# Patient Record
Sex: FEMALE | Race: WHITE | NOT HISPANIC OR LATINO | Employment: FULL TIME | URBAN - METROPOLITAN AREA
[De-identification: names, ages, dates, MRNs, and addresses within clinical notes are randomized per-mention and may not be internally consistent; named-entity substitution may affect disease eponyms.]

---

## 2017-02-24 ENCOUNTER — HOSPITAL ENCOUNTER (OUTPATIENT)
Dept: CARDIOLOGY | Facility: HOSPITAL | Age: 67
Discharge: HOME | End: 2017-02-24
Attending: INTERNAL MEDICINE
Payer: MEDICARE

## 2017-02-24 DIAGNOSIS — I27.20 PHT (PULMONARY HYPERTENSION): ICD-10-CM

## 2017-02-24 LAB
DIASTOLIC DYSFUNCTION: NO
ESTIMATED PA SYSTOLIC PRESSURE: 22.36
GLOBAL PERICARDIAL EFFUSION: NORMAL
RETIRED EF AND QEF - SEE NOTES: 65 (ref 55–65)
TRICUSPID VALVE REGURGITATION: NORMAL

## 2017-02-24 PROCEDURE — 93306 TTE W/DOPPLER COMPLETE: CPT

## 2017-02-24 PROCEDURE — 93306 TTE W/DOPPLER COMPLETE: CPT | Mod: 26,,, | Performed by: INTERNAL MEDICINE

## 2017-05-01 DIAGNOSIS — J84.9 ILD (INTERSTITIAL LUNG DISEASE): Primary | ICD-10-CM

## 2017-05-01 DIAGNOSIS — I27.20 PULMONARY HYPERTENSION: ICD-10-CM

## 2017-05-08 ENCOUNTER — HOSPITAL ENCOUNTER (OUTPATIENT)
Dept: PULMONOLOGY | Facility: HOSPITAL | Age: 67
Discharge: HOME | End: 2017-05-08
Attending: INTERNAL MEDICINE
Payer: MEDICARE

## 2017-05-08 DIAGNOSIS — J84.9 ILD (INTERSTITIAL LUNG DISEASE): ICD-10-CM

## 2017-05-08 DIAGNOSIS — I27.20 PULMONARY HYPERTENSION: ICD-10-CM

## 2017-05-08 PROCEDURE — 94010 BREATHING CAPACITY TEST: CPT

## 2017-05-08 PROCEDURE — 94729 DIFFUSING CAPACITY: CPT

## 2017-05-08 PROCEDURE — 94726 PLETHYSMOGRAPHY LUNG VOLUMES: CPT

## 2017-05-10 NOTE — PROCEDURES
[facility] Pulmonary Medicine -- Pulmonary Function Test Interpretation    Date of Study: 5/8/2017      Requesting Physician:   Dr. Willson    Diagnosis:   Dyspnea, Pulmonary Hypertension    Comparison Studies:   5/2016    Interpretation:   1. No significant obstruction   2. No restriction   3. Increased RV suggestive of underlying air trapping.   4. DLCO uncorrected for Hb is moderately reduced   5. Compared to prior study in 5/2016, no significant interval change is noted     Matty Powers M.D.  [facility] Pulmonar/Critical Care Fellow

## 2017-06-05 ENCOUNTER — TELEPHONE (OUTPATIENT)
Dept: OBSTETRICS AND GYNECOLOGY | Facility: CLINIC | Age: 67
End: 2017-06-05

## 2017-06-05 DIAGNOSIS — Z12.31 VISIT FOR SCREENING MAMMOGRAM: Primary | ICD-10-CM

## 2017-06-05 NOTE — TELEPHONE ENCOUNTER
Medicare patient requesting mammogram orders. Last GYN routine exam 06/15/16 (pap not indicated). Order pending, please advise.

## 2017-06-05 NOTE — TELEPHONE ENCOUNTER
----- Message from Katrina Jonas sent at 6/5/2017  9:10 AM CDT -----  Contact: Self [phone]  Patient is requesting Mammo Orders. Please advice

## 2017-06-19 ENCOUNTER — HOSPITAL ENCOUNTER (OUTPATIENT)
Dept: RADIOLOGY | Facility: HOSPITAL | Age: 67
Discharge: HOME | End: 2017-06-19
Attending: OBSTETRICS & GYNECOLOGY
Payer: MEDICARE

## 2017-06-19 VITALS — WEIGHT: 205 LBS | HEIGHT: 69 IN | BODY MASS INDEX: 30.36 KG/M2

## 2017-06-19 DIAGNOSIS — Z12.31 VISIT FOR SCREENING MAMMOGRAM: ICD-10-CM

## 2017-06-19 PROCEDURE — 77067 SCR MAMMO BI INCL CAD: CPT | Mod: 26,,, | Performed by: RADIOLOGY

## 2017-06-19 PROCEDURE — 77067 SCR MAMMO BI INCL CAD: CPT | Mod: TC

## 2017-06-19 PROCEDURE — 77063 BREAST TOMOSYNTHESIS BI: CPT | Mod: 26,,, | Performed by: RADIOLOGY

## 2017-09-13 ENCOUNTER — OFFICE VISIT (OUTPATIENT)
Dept: OTOLARYNGOLOGY | Facility: CLINIC | Age: 67
End: 2017-09-13
Payer: MEDICARE

## 2017-09-13 ENCOUNTER — CLINICAL SUPPORT (OUTPATIENT)
Dept: OTOLARYNGOLOGY | Facility: CLINIC | Age: 67
End: 2017-09-13
Payer: MEDICARE

## 2017-09-13 VITALS
BODY MASS INDEX: 30.21 KG/M2 | HEART RATE: 72 BPM | HEIGHT: 69 IN | SYSTOLIC BLOOD PRESSURE: 108 MMHG | DIASTOLIC BLOOD PRESSURE: 64 MMHG | WEIGHT: 203.94 LBS

## 2017-09-13 DIAGNOSIS — H61.23 BILATERAL IMPACTED CERUMEN: ICD-10-CM

## 2017-09-13 DIAGNOSIS — G47.33 OSA ON CPAP: ICD-10-CM

## 2017-09-13 DIAGNOSIS — H91.8X3 OTHER SPECIFIED HEARING LOSS OF BOTH EARS: ICD-10-CM

## 2017-09-13 DIAGNOSIS — M35.00 SJOGREN'S SYNDROME, WITH UNSPECIFIED ORGAN INVOLVEMENT: ICD-10-CM

## 2017-09-13 DIAGNOSIS — H93.13 TINNITUS, BILATERAL: ICD-10-CM

## 2017-09-13 DIAGNOSIS — R51.9 FREQUENT HEADACHES: ICD-10-CM

## 2017-09-13 DIAGNOSIS — D33.3 ACOUSTIC NEUROMA: Primary | ICD-10-CM

## 2017-09-13 DIAGNOSIS — L43.8 ORAL LICHEN PLANUS: ICD-10-CM

## 2017-09-13 DIAGNOSIS — H93.13 TINNITUS AURIUM, BILATERAL: Primary | ICD-10-CM

## 2017-09-13 PROCEDURE — 3008F BODY MASS INDEX DOCD: CPT | Mod: S$GLB,,, | Performed by: OTOLARYNGOLOGY

## 2017-09-13 PROCEDURE — 99214 OFFICE O/P EST MOD 30 MIN: CPT | Mod: 25,S$GLB,, | Performed by: OTOLARYNGOLOGY

## 2017-09-13 PROCEDURE — 1159F MED LIST DOCD IN RCRD: CPT | Mod: S$GLB,,, | Performed by: OTOLARYNGOLOGY

## 2017-09-13 PROCEDURE — 92557 COMPREHENSIVE HEARING TEST: CPT | Mod: S$GLB,,, | Performed by: AUDIOLOGIST

## 2017-09-13 PROCEDURE — G0268 REMOVAL OF IMPACTED WAX MD: HCPCS | Mod: S$GLB,,, | Performed by: OTOLARYNGOLOGY

## 2017-09-13 NOTE — PATIENT INSTRUCTIONS
Ordered follow up MRI of brain and inner ear.  Return for results and further recommendations in 2 weeks and to meet with audiologist.

## 2017-09-24 PROCEDURE — 69210 REMOVE IMPACTED EAR WAX UNI: CPT | Mod: S$GLB,,, | Performed by: OTOLARYNGOLOGY

## 2017-09-25 NOTE — PROGRESS NOTES
Subjective:       Patient ID: Bharti Woodard is a 66 y.o. female.    Chief Complaint: Follow-up (tinnitus and acoustic schwannoma)    Pt is known to me with hx of tinnitus for many years and worse in the right ear.  She has had cerumen occlusion of the ear canals and right middle ear effusion in the past.  An MRI done one year ago revealed a small left sided intracanalicular/CPA lesion of 8 mm consistent with acoustic schwannoma which after review of options she elected to observe.  There was mild asymmetry on her audiogram one year ago being slightly worse on the right as well as the tinnitus slightly worse on the right.  Today she reports her hearing is subjectively about the same AU and the tinnitus now seems about the same AU.  She has a new c/o mild left occipital headaches which may radiate into her left brow/eye and left cheek area occurring every 3 to 4 weeks for the past 6 months.  This seems to occur when she goes to bed.  There are no associated nasal symptoms and no other neurologic symptoms.  She is on CPAP for SISI using a full face mask every evening as of the past few months which she reports tolerating well with good results and stable weight.                           Review of Systems   Ears: Positive for ringing in ear.  Negative for hearing loss, ear pain, ear pressure, ear discharge, ear infections, head trauma and taken gentramycin/streptomycin.    Nose:  Positive for snoring. Negative for nosebleeds, nasal obstruction, nasal or sinus surgery and loss of smell.    Mouth/Throat: Positive for oral ulcers. Negative for pain swallowing, impaired swallowing, hoarse voice, throat mass, neck mass and neck lumps.   Constitutional: Negative for recent unexplained weight loss and fever.    Eyes:  Negative for history of glaucoma.   Cardiovascular:  Negative for chest pain and palpitations.   Respiratory:  Positive for recent cough. Negative for asthma and history of tuberculosis.    Gastrointestinal:   Negative for history of stomach ulcers or pain, acid reflux, indigestion and blood in stool.   Other:  Positive for arthritis, depression and anxiety. Negative for kidney problem, swollen glands, anemia and persistent infections.           Objective:      Physical Exam   Constitutional: She is oriented to person, place, and time. She appears well-developed and well-nourished. No distress.   HENT:   Head: Normocephalic and atraumatic.   Right Ear: Tympanic membrane and external ear normal.   Left Ear: Tympanic membrane and external ear normal.   Nose: No mucosal edema, rhinorrhea, nasal deformity or septal deviation. No epistaxis.   Mouth/Throat: Uvula is midline. No uvula swelling. No oropharyngeal exudate, posterior oropharyngeal edema or posterior oropharyngeal erythema.   The ear canals are occluded by cerumen bilaterally.  Upon removal of cerumen, the canals and TM's are otherwise nl.     There is a superficial pale white mucosal lesion of approximately 1.5 cm along the left buccal area with no worrisome features at this time and long hx of lichen planus and no tobacco use in 22 years.  The site is essentially asymptomatic with some fluxes in size and overall no progression.         Eyes: Conjunctivae and EOM are normal. Pupils are equal, round, and reactive to light. No scleral icterus.   Neck: Phonation normal. Neck supple. No tracheal deviation present. No thyromegaly present.   Cardiovascular: Normal rate and regular rhythm.    Pulmonary/Chest: Effort normal. No stridor. No respiratory distress.   Musculoskeletal: Normal range of motion.   Lymphadenopathy:     She has no cervical adenopathy.   Neurological: She is alert and oriented to person, place, and time. Coordination normal.   Skin: Skin is warm and dry. She is not diaphoretic.   Psychiatric: She has a normal mood and affect. Her behavior is normal.       Assessment:       1. Acoustic neuroma    2. Frequent headaches    3. Tinnitus, bilateral    4.  Other specified hearing loss of both ears    5. Bilateral impacted cerumen    6. Sjogren's syndrome, with unspecified organ involvement    7. Oral lichen planus    8. SISI on CPAP        Plan:       Ears cleaned as per procedure note.  Reviewed all above with pt and recommend follow up audiogram and pt agrees.      Audiogram reviewed with pt and there is normal to borderline high frequency hearing loss in both ears with good speech discrimination bilaterally.  Reviewed all above hx and findings and considerations and recommend follow up MRI in light of new c/o headaches.  Pt to follow up after MRI to discuss results and additional recommendations as indicated.

## 2017-09-25 NOTE — PROCEDURES
Ear Cerumen Removal  Date/Time: 9/24/2017 8:09 PM  Performed by: KAREN HUBBARD  Authorized by: KAREN HUBBARD     Consent Done?:  Yes (Verbal)  Location details:  Both ears  Procedure type: curette    Cerumen  Removal Results:  Cerumen completely removed  Patient tolerance:  Patient tolerated the procedure well with no immediate complications

## 2017-10-12 ENCOUNTER — TELEPHONE (OUTPATIENT)
Dept: OTOLARYNGOLOGY | Facility: CLINIC | Age: 67
End: 2017-10-12

## 2017-10-12 DIAGNOSIS — D33.3 ACOUSTIC NEUROMA: Primary | ICD-10-CM

## 2017-10-25 ENCOUNTER — HOSPITAL ENCOUNTER (OUTPATIENT)
Dept: RADIOLOGY | Facility: HOSPITAL | Age: 67
Discharge: HOME | End: 2017-10-25
Attending: OTOLARYNGOLOGY
Payer: MEDICARE

## 2017-10-25 DIAGNOSIS — D33.3 ACOUSTIC NEUROMA: ICD-10-CM

## 2017-10-25 PROCEDURE — 25500020 PHARM REV CODE 255: Performed by: OTOLARYNGOLOGY

## 2017-10-25 PROCEDURE — 70553 MRI BRAIN STEM W/O & W/DYE: CPT | Mod: TC

## 2017-10-25 PROCEDURE — 70553 MRI BRAIN STEM W/O & W/DYE: CPT | Mod: 26,,, | Performed by: RADIOLOGY

## 2017-10-25 PROCEDURE — A9585 GADOBUTROL INJECTION: HCPCS | Performed by: OTOLARYNGOLOGY

## 2017-10-25 RX ORDER — GADOBUTROL 604.72 MG/ML
10 INJECTION INTRAVENOUS
Status: COMPLETED | OUTPATIENT
Start: 2017-10-25 | End: 2017-10-25

## 2017-10-25 RX ADMIN — GADOBUTROL 10 ML: 604.72 INJECTION INTRAVENOUS at 05:10

## 2017-10-26 ENCOUNTER — PATIENT MESSAGE (OUTPATIENT)
Dept: OTOLARYNGOLOGY | Facility: CLINIC | Age: 67
End: 2017-10-26

## 2017-10-26 ENCOUNTER — TELEPHONE (OUTPATIENT)
Dept: OTOLARYNGOLOGY | Facility: CLINIC | Age: 67
End: 2017-10-26

## 2017-11-02 ENCOUNTER — OFFICE VISIT (OUTPATIENT)
Dept: OTOLARYNGOLOGY | Facility: CLINIC | Age: 67
End: 2017-11-02
Payer: MEDICARE

## 2017-11-02 VITALS
TEMPERATURE: 97 F | HEIGHT: 69 IN | DIASTOLIC BLOOD PRESSURE: 71 MMHG | SYSTOLIC BLOOD PRESSURE: 116 MMHG | HEART RATE: 73 BPM

## 2017-11-02 DIAGNOSIS — R51.9 FREQUENT HEADACHES: ICD-10-CM

## 2017-11-02 DIAGNOSIS — H93.13 TINNITUS, BILATERAL: ICD-10-CM

## 2017-11-02 DIAGNOSIS — D33.3 ACOUSTIC NEUROMA: Primary | ICD-10-CM

## 2017-11-02 PROCEDURE — 99214 OFFICE O/P EST MOD 30 MIN: CPT | Mod: S$GLB,,, | Performed by: OTOLARYNGOLOGY

## 2017-11-02 NOTE — PROGRESS NOTES
Subjective:       Patient ID: Bharti Woodard is a 67 y.o. female.    Chief Complaint: Other (go over MRI)    Ms. Woodard is here to discuss follow up MRI.  As previously noted, she was initially seen by me approximately 1 year ago with right-sided ear complaints.  Cerumen impactions were cleaned and she continued with right greater than left tinnitus and there was mild right sensorineural hearing loss.  MRI revealed a contralateral / left sided 8 mm lesion consistent with acoustic schwannoma.  Her options were reviewed including observation serial examination and stereotactic radiation.  Given its size and normal hearing in the left ear as well as her age she elected to observe.  She was given a copy of the report and offered neurotology evaluation but deferred.  She recently returned for follow-up of this as well as recurring cerumen impactions.  She now finds the tinnitus and hearing to be about the same in both ears.  However she has noted occasional headaches in the left occipital and left cheek and brow areas over the past few months.               Review of Systems   Ears: Positive for ringing in ear.  Negative for hearing loss, ear pain, ear pressure, ear discharge, ear infections, head trauma and taken gentramycin/streptomycin.    Nose:  Positive for snoring. Negative for nosebleeds, nasal obstruction, nasal or sinus surgery and loss of smell.    Mouth/Throat: Positive for oral ulcers. Negative for pain swallowing, impaired swallowing, hoarse voice, throat mass, neck mass and neck lumps.   Constitutional: Negative for recent unexplained weight loss and fever.    Eyes:  Negative for history of glaucoma.   Cardiovascular:  Negative for chest pain and palpitations.   Respiratory:  Negative for asthma and history of tuberculosis.    Gastrointestinal:  Negative for history of stomach ulcers or pain, acid reflux, indigestion and blood in stool.   Other:  Positive for arthritis, depression and anxiety. Negative for  kidney problem, swollen glands, anemia and persistent infections.           Objective:      Physical Exam   Constitutional: She is oriented to person, place, and time. She appears well-developed and well-nourished. No distress.   HENT:   Head: Normocephalic and atraumatic.   Right Ear: Tympanic membrane, external ear and ear canal normal.   Left Ear: Tympanic membrane, external ear and ear canal normal.   Nose: No mucosal edema, rhinorrhea, nasal deformity or septal deviation. No epistaxis.   Mouth/Throat: Uvula is midline. No uvula swelling. No oropharyngeal exudate, posterior oropharyngeal edema or posterior oropharyngeal erythema.   Left buccal area of lichen planus as per last note.         Eyes: Conjunctivae and EOM are normal. Pupils are equal, round, and reactive to light. No scleral icterus.   Neck: Phonation normal. Neck supple. No tracheal deviation present. No thyromegaly present.   Pulmonary/Chest: Effort normal. No stridor. No respiratory distress.   Musculoskeletal: Normal range of motion.   Lymphadenopathy:     She has no cervical adenopathy.   Neurological: She is alert and oriented to person, place, and time. Coordination normal.   Skin: Skin is warm and dry. She is not diaphoretic.   Psychiatric: She has a normal mood and affect. Her behavior is normal.       Assessment:       1. Acoustic neuroma    2. Tinnitus, bilateral    3. Frequent headaches        Plan:       Reviewed MRI of brain and IAC's report detail with pt which reveals possible slight enlargement and recommend she see Dr. Perez, neurotologist to discuss further.  Dr. Castillo's contact information provided as well as cc of MRI report.  Offered to make appointment for pt, but states wants to discuss with family and make appointment herself and indicates may go to neurotologist outside of the [facility] system.  Pt to call if assistance needed in getting appointment or other assistance needed.

## 2017-11-02 NOTE — PATIENT INSTRUCTIONS
See Dr. Perez, neurotologist to discuss further.  Offered to make appointment for pt, but she wants to call herself.    Their phone number is [phone].  Pt to call here if any assistance needed with this.

## 2017-11-16 DIAGNOSIS — I27.20 PULMONARY HYPERTENSION: ICD-10-CM

## 2017-11-16 DIAGNOSIS — J84.9 ILD (INTERSTITIAL LUNG DISEASE): Primary | ICD-10-CM

## 2017-11-17 ENCOUNTER — HOSPITAL ENCOUNTER (OUTPATIENT)
Dept: PULMONOLOGY | Facility: CLINIC | Age: 67
Discharge: HOME | End: 2017-11-17
Payer: MEDICARE

## 2017-11-17 ENCOUNTER — OFFICE VISIT (OUTPATIENT)
Dept: OTOLARYNGOLOGY | Facility: CLINIC | Age: 67
End: 2017-11-17
Payer: MEDICARE

## 2017-11-17 VITALS — BODY MASS INDEX: 29.95 KG/M2 | WEIGHT: 202.81 LBS

## 2017-11-17 VITALS — HEIGHT: 69 IN | WEIGHT: 202 LBS | BODY MASS INDEX: 29.92 KG/M2

## 2017-11-17 DIAGNOSIS — D33.3 ACOUSTIC NEUROMA: Primary | ICD-10-CM

## 2017-11-17 DIAGNOSIS — I27.20 PULMONARY HYPERTENSION: ICD-10-CM

## 2017-11-17 DIAGNOSIS — J84.9 ILD (INTERSTITIAL LUNG DISEASE): ICD-10-CM

## 2017-11-17 PROCEDURE — 94620 PR PULMONARY STRESS TESTING,SIMPLE: CPT | Mod: S$GLB,,, | Performed by: INTERNAL MEDICINE

## 2017-11-17 PROCEDURE — 99999 PR PBB SHADOW E&M-EST. PATIENT-LVL II: CPT | Mod: PBBFAC,,, | Performed by: OTOLARYNGOLOGY

## 2017-11-17 PROCEDURE — 99215 OFFICE O/P EST HI 40 MIN: CPT | Mod: S$GLB,,, | Performed by: OTOLARYNGOLOGY

## 2017-11-17 NOTE — PROGRESS NOTES
Subjective:       Patient ID: Bharti Woodard is a 67 y.o. female.    Chief Complaint: acoustic neuroma    HPI: Ref DR Welsh.    Has small L VN.    Has grown 1-2 mm over last yr.    Now 1.1x.8x.6 cm.    No other sx's.    Past Medical History: Patient has a past medical history of Depression; ILD (interstitial lung disease); Pulmonary hypertension; and Sjoegren syndrome.    Past Surgical History: Patient has a past surgical history that includes Lung biopsy; Cardiac catheterization (Right, 2016); Hysterectomy; and Cataract surgery (Bilateral).    Social History: Patient reports that she quit smoking about 22 years ago. She has never used smokeless tobacco. She reports that she drinks alcohol. She reports that she does not use drugs.    Family History: family history includes Breast cancer in her maternal aunt and sister; Diabetes in her brother, father, mother, and sister; Heart disease in her mother; Stroke in her brother.    Medications:   Current Outpatient Prescriptions   Medication Sig    ADCIRCA 20 mg Tab Take 40 mg by mouth once daily.    furosemide (LASIX) 20 MG tablet Take 40 mg by mouth 2 (two) times daily.    mycophenolate (CELLCEPT) 500 mg Tab Take 1,000 mg by mouth 2 (two) times daily.      No current facility-administered medications for this visit.        Allergies: Patient has No Known Allergies.      Review of Systems   Constitutional: Negative.    HENT: Negative for ear discharge, ear pain, hearing loss and tinnitus.    Neurological: Negative for dizziness, seizures, syncope, facial asymmetry, speech difficulty, weakness, light-headedness and headaches.       Objective:      Physical Exam   Constitutional: She appears well-developed and well-nourished. No distress.   HENT:   Right Ear: External ear normal. No lacerations. No drainage, swelling or tenderness. No foreign bodies. No mastoid tenderness. Tympanic membrane is not injected, not scarred, not perforated, not erythematous, not retracted and  not bulging. Tympanic membrane mobility is normal. No middle ear effusion. No hemotympanum. No decreased hearing is noted.   Left Ear: External ear normal. No lacerations. No drainage, swelling or tenderness. No foreign bodies. No mastoid tenderness. Tympanic membrane is not injected, not scarred, not perforated, not erythematous, not retracted and not bulging. Tympanic membrane mobility is normal.  No middle ear effusion. No hemotympanum. No decreased hearing is noted.   Eyes: Right eye exhibits normal extraocular motion and no nystagmus. Left eye exhibits normal extraocular motion and no nystagmus.   Neurological: She has normal strength. No cranial nerve deficit or sensory deficit. She displays a negative Romberg sign. Coordination and gait normal.   Skin: She is not diaphoretic.               Assessment:       1. Acoustic neuroma        Plan:         Long talk 30-40 min. Imaging review    Rec focused XRT vs MS vs obs.    She will make appt. XRT.

## 2017-11-17 NOTE — LETTER
November 17, 2017      Jasmyne Welsh MD  [address]  [address]  [facility] [address]           [facility] O[facility]  [address]  [facility] [address]  Phone: [phone]  Fax: [phone]          Patient: Bharti Woodard   MR Number: [id number]   YOB: 1950   Date of Visit: 11/17/2017       Dear Dr. Jasmyne Welsh:    Thank you for referring Bharti Woodard to me for evaluation. Attached you will find relevant portions of my assessment and plan of care.    If you have questions, please do not hesitate to call me. I look forward to following Bharti Woodard along with you.    Sincerely,    Osorio Perez MD    Enclosure  CC:  No Recipients    If you would like to receive this communication electronically, please contact [email or web address] or [phone] to request more information on [employer] access.    For providers and/or their staff who would like to refer a patient to [facility], please contact us through our one-stop-shop provider referral line, [facility], at [phone].    If you feel you have received this communication in error or would no longer like to receive these types of communications, please e-mail [email or web address]

## 2017-11-20 ENCOUNTER — PATIENT MESSAGE (OUTPATIENT)
Dept: OTOLARYNGOLOGY | Facility: CLINIC | Age: 67
End: 2017-11-20

## 2017-11-27 ENCOUNTER — TELEPHONE (OUTPATIENT)
Dept: RADIATION ONCOLOGY | Facility: CLINIC | Age: 67
End: 2017-11-27

## 2017-11-27 NOTE — TELEPHONE ENCOUNTER
----- Message from Paula Denis sent at 11/27/2017  2:37 PM CST -----  Pt would like to speak to you about the appointment you made her with Dr Peguero. Please call at [phone].  Return call to patient she wishes to cx her appt

## 2018-01-19 ENCOUNTER — HOSPITAL ENCOUNTER (OUTPATIENT)
Dept: RADIOLOGY | Facility: HOSPITAL | Age: 68
Discharge: HOME | End: 2018-01-19
Attending: INTERNAL MEDICINE
Payer: MEDICARE

## 2018-01-19 DIAGNOSIS — J18.9 PNEUMONIA OF RIGHT LOWER LOBE DUE TO INFECTIOUS ORGANISM: Primary | ICD-10-CM

## 2018-01-19 DIAGNOSIS — J18.9 PNEUMONIA OF RIGHT LOWER LOBE DUE TO INFECTIOUS ORGANISM: ICD-10-CM

## 2018-01-19 PROCEDURE — 71046 X-RAY EXAM CHEST 2 VIEWS: CPT | Mod: 26,,, | Performed by: RADIOLOGY

## 2018-01-19 PROCEDURE — 71046 X-RAY EXAM CHEST 2 VIEWS: CPT | Mod: TC,FY

## 2018-07-09 ENCOUNTER — PATIENT MESSAGE (OUTPATIENT)
Dept: OBSTETRICS AND GYNECOLOGY | Facility: CLINIC | Age: 68
End: 2018-07-09

## 2018-11-29 ENCOUNTER — HOSPITAL ENCOUNTER (OUTPATIENT)
Dept: RADIOLOGY | Facility: HOSPITAL | Age: 68
Discharge: HOME | End: 2018-11-29
Attending: OBSTETRICS & GYNECOLOGY
Payer: MEDICARE

## 2018-11-29 ENCOUNTER — OFFICE VISIT (OUTPATIENT)
Dept: OBSTETRICS AND GYNECOLOGY | Facility: CLINIC | Age: 68
End: 2018-11-29
Payer: MEDICARE

## 2018-11-29 VITALS
WEIGHT: 213.31 LBS | BODY MASS INDEX: 31.6 KG/M2 | HEIGHT: 69 IN | SYSTOLIC BLOOD PRESSURE: 118 MMHG | DIASTOLIC BLOOD PRESSURE: 66 MMHG

## 2018-11-29 DIAGNOSIS — Z01.419 ENCOUNTER FOR GYNECOLOGICAL EXAMINATION WITHOUT ABNORMAL FINDING: Primary | ICD-10-CM

## 2018-11-29 DIAGNOSIS — Z12.39 BREAST CANCER SCREENING: ICD-10-CM

## 2018-11-29 PROCEDURE — G0101 CA SCREEN;PELVIC/BREAST EXAM: HCPCS | Mod: S$GLB,,, | Performed by: OBSTETRICS & GYNECOLOGY

## 2018-11-29 PROCEDURE — 77063 BREAST TOMOSYNTHESIS BI: CPT | Mod: 26,,, | Performed by: RADIOLOGY

## 2018-11-29 PROCEDURE — 99999 PR PBB SHADOW E&M-EST. PATIENT-LVL III: CPT | Mod: PBBFAC,,, | Performed by: OBSTETRICS & GYNECOLOGY

## 2018-11-29 PROCEDURE — 77067 SCR MAMMO BI INCL CAD: CPT | Mod: TC

## 2018-11-29 PROCEDURE — 77063 BREAST TOMOSYNTHESIS BI: CPT | Mod: TC

## 2018-11-29 PROCEDURE — 77067 SCR MAMMO BI INCL CAD: CPT | Mod: 26,,, | Performed by: RADIOLOGY

## 2018-11-29 RX ORDER — CYCLOSPORINE 0.5 MG/ML
1 EMULSION OPHTHALMIC 2 TIMES DAILY
Refills: 11 | COMMUNITY
Start: 2018-11-16

## 2018-11-29 NOTE — PROGRESS NOTES
"OBSTETRIC HISTORY:   OB History      Para Term  AB Living    3 3 3     3    SAB TAB Ectopic Multiple Live Births            3         COMPREHENSIVE GYN HISTORY:    PAP History: Denies abnormal Paps.  Infection History: Denies STDs. Denies PID.  Benign History: Denies uterine fibroids. Denies ovarian cysts. Denies endometriosis.   Cancer History: Denies cervical cancer. Denies uterine cancer or hyperplasia. Denies ovarian cancer. Denies vulvar cancer or pre-cancer. Denies vaginal cancer or pre-cancer. Denies breast cancer. Denies colon cancer.  Sexual Activity History:not sexually active  S/P hysterectomy           HPI:   68 y.o. Patient's last menstrual period was 1976.    Patient is  here for Medicare pelvic and breast exam. She has no complaints. She denies bladder, bowel and breast complaints.    ROS:  GENERAL: Denies weight gain or weight loss. Feeling well overall.   SKIN: Denies rash or lesions.   HEAD: Denies headache.   NODES: Denies enlarged lymph nodes.   CHEST: Denies shortness of breath.   ABDOMEN: No abdominal pain, constipation, diarrhea, nausea, vomiting or rectal bleeding.   URINARY: No frequency, dysuria, hematuria, or burning on urination.  REPRODUCTIVE: See HPI.   BREASTS: The patient denies pain, lumps, or nipple discharge.   HEMATOLOGIC: No easy bruisability.   MUSCULOSKELETAL: Denies joint pain or back pain.   NEUROLOGIC: Denies weakness.   PSYCHIATRIC: Denies depression, anxiety or mood swings.        PE:   /66   Ht 5' 9" (1.753 m)   Wt 96.7 kg (213 lb 4.7 oz)   LMP 1976   BMI 31.50 kg/m²   APPEARANCE: Well nourished, well developed, in no acute distress.  NECK: Neck symmetric without thyromegaly.  NODES: No inguinal or cervical lymph node enlargement.  CHEST: Lungs clear to auscultation.  HEART: Regular rate and rhythm, no murmurs, rubs or gallops.  ABDOMEN: Soft. No tenderness or masses. No hernias.  BREASTS: Symmetrical, no skin changes or visible " lesions. No palpable masses, nipple discharge or adenopathy bilaterally.  VULVA: No lesions. Normal female genitalia.  URETHRAL MEATUS: Normal size and location, no lesions, no prolapse.  URETHRA: No masses, tenderness, prolapse or scarring.  VAGINA: Atrophic and not well rugated, no discharge, no significant cystocele or rectocele.  CERVIX AND UTERUS SURGICALLY ABSENT.   ADNEXA: No masses or tenderness.    PROCEDURES:  Pap smear -- NOT INDICATED    Assessment/Plan:  Medicare pelvic and breast exam

## 2019-11-21 ENCOUNTER — TELEPHONE (OUTPATIENT)
Dept: OBSTETRICS AND GYNECOLOGY | Facility: CLINIC | Age: 69
End: 2019-11-21

## 2019-11-21 DIAGNOSIS — Z12.31 ENCOUNTER FOR SCREENING MAMMOGRAM FOR BREAST CANCER: Primary | ICD-10-CM

## 2019-11-21 NOTE — TELEPHONE ENCOUNTER
----- Message from Jessica Trinh sent at 11/21/2019  9:33 AM CST -----  Contact: [phone]  Patient is requesting a call back concerning having orders put in for a mammo. Please call

## 2019-12-16 ENCOUNTER — HOSPITAL ENCOUNTER (OUTPATIENT)
Dept: RADIOLOGY | Facility: HOSPITAL | Age: 69
Discharge: HOME | End: 2019-12-16
Attending: OBSTETRICS & GYNECOLOGY
Payer: MEDICARE

## 2019-12-16 DIAGNOSIS — Z12.31 ENCOUNTER FOR SCREENING MAMMOGRAM FOR BREAST CANCER: ICD-10-CM

## 2019-12-16 PROCEDURE — 77067 SCR MAMMO BI INCL CAD: CPT | Mod: 26,,, | Performed by: RADIOLOGY

## 2019-12-16 PROCEDURE — 77067 MAMMO DIGITAL SCREENING BILAT WITH TOMOSYNTHESIS_CAD: ICD-10-PCS | Mod: 26,,, | Performed by: RADIOLOGY

## 2019-12-16 PROCEDURE — 77063 MAMMO DIGITAL SCREENING BILAT WITH TOMOSYNTHESIS_CAD: ICD-10-PCS | Mod: 26,,, | Performed by: RADIOLOGY

## 2019-12-16 PROCEDURE — 77067 SCR MAMMO BI INCL CAD: CPT | Mod: TC

## 2019-12-16 PROCEDURE — 77063 BREAST TOMOSYNTHESIS BI: CPT | Mod: 26,,, | Performed by: RADIOLOGY

## 2020-01-28 ENCOUNTER — HOSPITAL ENCOUNTER (OUTPATIENT)
Dept: RADIOLOGY | Facility: HOSPITAL | Age: 70
Discharge: HOME | End: 2020-01-28
Attending: INTERNAL MEDICINE
Payer: MEDICARE

## 2020-01-28 DIAGNOSIS — R07.9 CHEST PAIN, UNSPECIFIED: ICD-10-CM

## 2020-01-28 PROCEDURE — 71275 CTA CHEST NON CORONARY: ICD-10-PCS | Mod: 26,,, | Performed by: RADIOLOGY

## 2020-01-28 PROCEDURE — 71275 CT ANGIOGRAPHY CHEST: CPT | Mod: 26,,, | Performed by: RADIOLOGY

## 2020-01-28 PROCEDURE — 71275 CT ANGIOGRAPHY CHEST: CPT | Mod: TC

## 2020-01-28 PROCEDURE — 25500020 PHARM REV CODE 255: Performed by: INTERNAL MEDICINE

## 2020-01-28 RX ADMIN — IOHEXOL 100 ML: 350 INJECTION, SOLUTION INTRAVENOUS at 02:01

## 2020-01-30 DIAGNOSIS — R06.00 DYSPNEA, UNSPECIFIED TYPE: ICD-10-CM

## 2020-03-03 ENCOUNTER — HOSPITAL ENCOUNTER (OUTPATIENT)
Dept: RADIOLOGY | Facility: HOSPITAL | Age: 70
Discharge: HOME | End: 2020-03-03
Attending: INTERNAL MEDICINE
Payer: MEDICARE

## 2020-03-03 DIAGNOSIS — R06.00 DYSPNEA, UNSPECIFIED TYPE: ICD-10-CM

## 2020-03-03 PROCEDURE — 71250 CT THORAX DX C-: CPT | Mod: 26,,, | Performed by: RADIOLOGY

## 2020-03-03 PROCEDURE — 71250 CT THORAX DX C-: CPT | Mod: TC

## 2020-03-03 PROCEDURE — 71250 CT CHEST WITHOUT CONTRAST: ICD-10-PCS | Mod: 26,,, | Performed by: RADIOLOGY

## 2020-07-13 ENCOUNTER — OFFICE VISIT (OUTPATIENT)
Dept: RHEUMATOLOGY | Facility: CLINIC | Age: 70
End: 2020-07-13
Payer: MEDICARE

## 2020-07-13 ENCOUNTER — HOSPITAL ENCOUNTER (OUTPATIENT)
Dept: RADIOLOGY | Facility: HOSPITAL | Age: 70
Discharge: HOME | End: 2020-07-13
Attending: INTERNAL MEDICINE
Payer: MEDICARE

## 2020-07-13 VITALS
HEART RATE: 61 BPM | WEIGHT: 213.63 LBS | SYSTOLIC BLOOD PRESSURE: 102 MMHG | DIASTOLIC BLOOD PRESSURE: 57 MMHG | TEMPERATURE: 99 F | HEIGHT: 69 IN | BODY MASS INDEX: 31.64 KG/M2

## 2020-07-13 DIAGNOSIS — I27.20 PULMONARY HYPERTENSION: ICD-10-CM

## 2020-07-13 DIAGNOSIS — M25.50 ARTHRALGIA, UNSPECIFIED JOINT: ICD-10-CM

## 2020-07-13 DIAGNOSIS — M35.00 SJOGREN'S SYNDROME, WITH UNSPECIFIED ORGAN INVOLVEMENT: Primary | ICD-10-CM

## 2020-07-13 DIAGNOSIS — M79.642 PAIN IN BOTH HANDS: ICD-10-CM

## 2020-07-13 DIAGNOSIS — M79.672 PAIN IN BOTH FEET: ICD-10-CM

## 2020-07-13 DIAGNOSIS — M35.00 SJOGREN'S SYNDROME, WITH UNSPECIFIED ORGAN INVOLVEMENT: ICD-10-CM

## 2020-07-13 DIAGNOSIS — M79.641 PAIN IN BOTH HANDS: ICD-10-CM

## 2020-07-13 DIAGNOSIS — J84.9 ILD (INTERSTITIAL LUNG DISEASE): ICD-10-CM

## 2020-07-13 DIAGNOSIS — R53.83 FATIGUE, UNSPECIFIED TYPE: ICD-10-CM

## 2020-07-13 DIAGNOSIS — M85.80 OSTEOPENIA, UNSPECIFIED LOCATION: ICD-10-CM

## 2020-07-13 DIAGNOSIS — M54.2 NECK PAIN: ICD-10-CM

## 2020-07-13 DIAGNOSIS — M79.671 PAIN IN BOTH FEET: ICD-10-CM

## 2020-07-13 PROCEDURE — 1159F MED LIST DOCD IN RCRD: CPT | Mod: S$GLB,,, | Performed by: INTERNAL MEDICINE

## 2020-07-13 PROCEDURE — 77077 JOINT SURVEY SINGLE VIEW: CPT | Mod: TC

## 2020-07-13 PROCEDURE — 1101F PT FALLS ASSESS-DOCD LE1/YR: CPT | Mod: CPTII,S$GLB,, | Performed by: INTERNAL MEDICINE

## 2020-07-13 PROCEDURE — 3008F BODY MASS INDEX DOCD: CPT | Mod: CPTII,S$GLB,, | Performed by: INTERNAL MEDICINE

## 2020-07-13 PROCEDURE — 1159F PR MEDICATION LIST DOCUMENTED IN MEDICAL RECORD: ICD-10-PCS | Mod: S$GLB,,, | Performed by: INTERNAL MEDICINE

## 2020-07-13 PROCEDURE — 99999 PR PBB SHADOW E&M-EST. PATIENT-LVL IV: CPT | Mod: PBBFAC,,, | Performed by: INTERNAL MEDICINE

## 2020-07-13 PROCEDURE — 99204 OFFICE O/P NEW MOD 45 MIN: CPT | Mod: S$GLB,,, | Performed by: INTERNAL MEDICINE

## 2020-07-13 PROCEDURE — 1125F PR PAIN SEVERITY QUANTIFIED, PAIN PRESENT: ICD-10-PCS | Mod: S$GLB,,, | Performed by: INTERNAL MEDICINE

## 2020-07-13 PROCEDURE — 99999 PR PBB SHADOW E&M-EST. PATIENT-LVL IV: ICD-10-PCS | Mod: PBBFAC,,, | Performed by: INTERNAL MEDICINE

## 2020-07-13 PROCEDURE — 3008F PR BODY MASS INDEX (BMI) DOCUMENTED: ICD-10-PCS | Mod: CPTII,S$GLB,, | Performed by: INTERNAL MEDICINE

## 2020-07-13 PROCEDURE — 99204 PR OFFICE/OUTPT VISIT, NEW, LEVL IV, 45-59 MIN: ICD-10-PCS | Mod: S$GLB,,, | Performed by: INTERNAL MEDICINE

## 2020-07-13 PROCEDURE — 1101F PR PT FALLS ASSESS DOC 0-1 FALLS W/OUT INJ PAST YR: ICD-10-PCS | Mod: CPTII,S$GLB,, | Performed by: INTERNAL MEDICINE

## 2020-07-13 PROCEDURE — 77077 XR ARTHRITIS SURVEY: ICD-10-PCS | Mod: 26,,, | Performed by: RADIOLOGY

## 2020-07-13 PROCEDURE — 1125F AMNT PAIN NOTED PAIN PRSNT: CPT | Mod: S$GLB,,, | Performed by: INTERNAL MEDICINE

## 2020-07-13 PROCEDURE — 77077 JOINT SURVEY SINGLE VIEW: CPT | Mod: 26,,, | Performed by: RADIOLOGY

## 2020-07-13 RX ORDER — DIOSMIN COMPLEX NO.1 630 MG
630 TABLET ORAL DAILY
COMMUNITY
End: 2023-04-24

## 2020-07-13 NOTE — PROGRESS NOTES
Answers for HPI/ROS submitted by the patient on 7/12/2020   fever: No  eye redness: No  headaches: No  shortness of breath: No  chest pain: No  trouble swallowing: No  diarrhea: No  constipation: No  unexpected weight change: No  genital sore: No  dysuria: No  During the last 3 days, have you had a skin rash?: No  Bruises or bleeds easily: No  cough: No

## 2020-07-13 NOTE — PROGRESS NOTES
Subjective:       Patient ID: Bharti Woodard is a 69 y.o. female.    Chief Complaint: Sjogrens and ILD    HPI:  Bharti Woodard is a 69 y.o. female with history of Sjogrens diagnosed 50-59 yo. She has difficulty remembering history.   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  Dr. Aldridge at [facility] saw her and diagnosed with lupus and gave prednisone and Plaquenil but she gained 60 pounds.    Saw Dr. Tirado a rheumatologist who diagnosed Sjogrens and sent her a pulmonologist.  Dr. Tirado gave her Mycophenolate.  Pulmonary HTN diagnosed 2010 with Dr. Evans.  Dr. Cassi Nunez rheumatologist at [facility] saw her and was planning to cut down on medication in future.  Patient decided to lower in her own so they felt she was noncompliant.  Patient decided to switch.   Saw Dr. Dunne who did not want to treat her since care needed to be coordinated with rheumatologist.  So she saw Dr. Cabrera and Dr. Herbert.      January 2020 felt very sick.  Tested negative for flu.  She was given antibiotics.  Since then has been fatigued with intermittent joint pains as well as tingling in her feet.     Tylenol 650 mg three times daily helps pain in neck, feet, ankle and hands.  Today 2/10 ache today.  Nothing worsens pain.  Pain began after URI treated in January 2020.       Review of chart shows that patient was seen on 2/15/2005 by Dr. Lenora Ackerman in rheumatology for a second opinion.  Per that note patient had persistently elevate sedimentation rate and was diagnosed as PMR by Dr. Aldridge who treated her with prednisone that was later stopped.  Plaquenil was given but caused all over body rash.    Care Everywhere:  1/29/2019 Dr. Herbert note  Biopsy:   Biopsy at  10/2012: diffuse scattered non-necrotizing granulomas (special stains negative) with some areas of fibrosis, but mostly normal lung; scattered lymphoid aggregates    Labs:  November 2014: SSA positive  VENITA 1:1280, positive dsDNA, centromere and Sm/RNP, negative Sm,  "Scl70, RF, SSB           Review of Systems   Constitutional: Positive for fatigue. Negative for fever and unexpected weight change.   HENT: Negative for trouble swallowing.    Eyes: Negative for redness.   Respiratory: Positive for shortness of breath (sleeps with 4 L on CPAP and 2-3 L when awake). Negative for cough.    Cardiovascular: Negative for chest pain.   Gastrointestinal: Negative for constipation and diarrhea.   Endocrine: Negative.    Genitourinary: Negative for dysuria and genital sores.   Musculoskeletal: Positive for arthralgias (Lateral foot and ankle pain.  Right knee pain diagnosed with bursitis by ortho who gave NSAID but made her sick so now taking Tylenol.).   Skin: Negative for rash.   Neurological: Negative for headaches.   Hematological: Does not bruise/bleed easily.         Objective:   BP (!) 102/57 (BP Location: Right arm, Patient Position: Sitting, BP Method: Medium (Automatic))   Pulse 61   Temp 98.6 °F (37 °C)   Ht 5' 9" (1.753 m)   Wt 96.9 kg (213 lb 10 oz)   LMP 01/01/1976   BMI 31.55 kg/m²      Physical Exam   Constitutional: She is oriented to person, place, and time and well-developed, well-nourished, and in no distress.   HENT:   Head: Normocephalic and atraumatic.   Good saliva production   Eyes: Conjunctivae and EOM are normal.   Good tear production   Neck: Normal range of motion. Neck supple.   Cardiovascular: Normal rate, regular rhythm and normal heart sounds.    Pulmonary/Chest: Effort normal and breath sounds normal.   Abdominal: Soft. Bowel sounds are normal.   Neurological: She is alert and oriented to person, place, and time. Gait normal.   Skin: Skin is warm and dry.     Psychiatric: Mood and affect normal.   Musculoskeletal:      Comments: 28 joint count: 0 swollen and 0 tender            Assessment:       1. Sjogern's disease.  Uses Restasis  2. Pulmonary HTN  3. Interstitial lung disease.  Currently on mycophenolate managed by pulmonary Dr. Willson  4. Joint " pain.  Hands, feet and neck.   5. Stress/Depression.  Encourage patient to speak with primary  6. Immunosuppression  7. Former smoker.  3 packs a day for five years    Plan:       1.  Labs  2.  Arthritis survey  3.  Obtain old record   RTO 3 months/prn

## 2020-07-14 ENCOUNTER — PATIENT MESSAGE (OUTPATIENT)
Dept: RHEUMATOLOGY | Facility: CLINIC | Age: 70
End: 2020-07-14

## 2020-07-15 ENCOUNTER — PATIENT MESSAGE (OUTPATIENT)
Dept: RHEUMATOLOGY | Facility: CLINIC | Age: 70
End: 2020-07-15

## 2020-07-17 ENCOUNTER — PATIENT MESSAGE (OUTPATIENT)
Dept: RHEUMATOLOGY | Facility: CLINIC | Age: 70
End: 2020-07-17

## 2020-07-17 DIAGNOSIS — M35.00 SJOGREN'S SYNDROME, WITH UNSPECIFIED ORGAN INVOLVEMENT: Primary | ICD-10-CM

## 2020-07-17 DIAGNOSIS — I27.20 PULMONARY HYPERTENSION: ICD-10-CM

## 2020-07-17 DIAGNOSIS — R53.83 FATIGUE, UNSPECIFIED TYPE: ICD-10-CM

## 2020-07-17 DIAGNOSIS — J84.9 ILD (INTERSTITIAL LUNG DISEASE): ICD-10-CM

## 2020-07-23 NOTE — TELEPHONE ENCOUNTER
Informed patient of lab results over the phone.  She is feeling well today.  MCV is elevated. Sed rate elevated and normal CRP.  Check SPEP. One week or so before visit Pedro 10 am.

## 2020-08-07 ENCOUNTER — LAB VISIT (OUTPATIENT)
Dept: LAB | Facility: HOSPITAL | Age: 70
End: 2020-08-07
Attending: INTERNAL MEDICINE
Payer: MEDICARE

## 2020-08-07 DIAGNOSIS — Z20.822 EXPOSURE TO COVID-19 VIRUS: ICD-10-CM

## 2020-08-07 LAB — SARS-COV-2 IGG SERPLBLD QL IA.RAPID: NEGATIVE

## 2020-08-07 PROCEDURE — 36415 COLL VENOUS BLD VENIPUNCTURE: CPT

## 2020-08-07 PROCEDURE — 86769 SARS-COV-2 COVID-19 ANTIBODY: CPT

## 2020-09-28 ENCOUNTER — PATIENT MESSAGE (OUTPATIENT)
Dept: RHEUMATOLOGY | Facility: CLINIC | Age: 70
End: 2020-09-28

## 2020-09-28 DIAGNOSIS — E66.9 CLASS 1 OBESITY WITH BODY MASS INDEX (BMI) OF 31.0 TO 31.9 IN ADULT, UNSPECIFIED OBESITY TYPE, UNSPECIFIED WHETHER SERIOUS COMORBIDITY PRESENT: Primary | ICD-10-CM

## 2020-09-28 DIAGNOSIS — R53.83 FATIGUE, UNSPECIFIED TYPE: ICD-10-CM

## 2020-09-29 ENCOUNTER — PATIENT MESSAGE (OUTPATIENT)
Dept: RHEUMATOLOGY | Facility: CLINIC | Age: 70
End: 2020-09-29

## 2020-10-06 DIAGNOSIS — I27.20 PULMONARY HYPERTENSION: Primary | ICD-10-CM

## 2020-10-12 ENCOUNTER — LAB VISIT (OUTPATIENT)
Dept: LAB | Facility: HOSPITAL | Age: 70
End: 2020-10-12
Attending: INTERNAL MEDICINE
Payer: MEDICARE

## 2020-10-12 DIAGNOSIS — I27.20 PULMONARY HYPERTENSION: ICD-10-CM

## 2020-10-12 DIAGNOSIS — E66.9 CLASS 1 OBESITY WITH BODY MASS INDEX (BMI) OF 31.0 TO 31.9 IN ADULT, UNSPECIFIED OBESITY TYPE, UNSPECIFIED WHETHER SERIOUS COMORBIDITY PRESENT: ICD-10-CM

## 2020-10-12 DIAGNOSIS — J84.9 ILD (INTERSTITIAL LUNG DISEASE): ICD-10-CM

## 2020-10-12 DIAGNOSIS — R53.83 FATIGUE, UNSPECIFIED TYPE: ICD-10-CM

## 2020-10-12 DIAGNOSIS — M35.00 SJOGREN'S SYNDROME, WITH UNSPECIFIED ORGAN INVOLVEMENT: ICD-10-CM

## 2020-10-12 LAB
ALBUMIN SERPL BCP-MCNC: 3.9 G/DL (ref 3.5–5.2)
ALP SERPL-CCNC: 85 U/L (ref 55–135)
ALT SERPL W/O P-5'-P-CCNC: 9 U/L (ref 10–44)
ANION GAP SERPL CALC-SCNC: 11 MMOL/L (ref 8–16)
AST SERPL-CCNC: 14 U/L (ref 10–40)
BASOPHILS # BLD AUTO: 0.01 K/UL (ref 0–0.2)
BASOPHILS NFR BLD: 0.2 % (ref 0–1.9)
BILIRUB SERPL-MCNC: 0.2 MG/DL (ref 0.1–1)
BUN SERPL-MCNC: 13 MG/DL (ref 8–23)
CALCIUM SERPL-MCNC: 9.5 MG/DL (ref 8.7–10.5)
CHLORIDE SERPL-SCNC: 101 MMOL/L (ref 95–110)
CHOLEST SERPL-MCNC: 213 MG/DL (ref 120–199)
CHOLEST/HDLC SERPL: 4.7 {RATIO} (ref 2–5)
CO2 SERPL-SCNC: 29 MMOL/L (ref 23–29)
CREAT SERPL-MCNC: 0.9 MG/DL (ref 0.5–1.4)
CRP SERPL-MCNC: 6.4 MG/L (ref 0–8.2)
DIFFERENTIAL METHOD: ABNORMAL
EOSINOPHIL # BLD AUTO: 0.1 K/UL (ref 0–0.5)
EOSINOPHIL NFR BLD: 1.4 % (ref 0–8)
ERYTHROCYTE [DISTWIDTH] IN BLOOD BY AUTOMATED COUNT: 12.4 % (ref 11.5–14.5)
ERYTHROCYTE [SEDIMENTATION RATE] IN BLOOD BY WESTERGREN METHOD: 67 MM/HR (ref 0–20)
EST. GFR  (AFRICAN AMERICAN): >60 ML/MIN/1.73 M^2
EST. GFR  (NON AFRICAN AMERICAN): >60 ML/MIN/1.73 M^2
GLUCOSE SERPL-MCNC: 93 MG/DL (ref 70–110)
HCT VFR BLD AUTO: 39 % (ref 37–48.5)
HDLC SERPL-MCNC: 45 MG/DL (ref 40–75)
HDLC SERPL: 21.1 % (ref 20–50)
HGB BLD-MCNC: 12.3 G/DL (ref 12–16)
IMM GRANULOCYTES # BLD AUTO: 0.02 K/UL (ref 0–0.04)
IMM GRANULOCYTES NFR BLD AUTO: 0.5 % (ref 0–0.5)
LDLC SERPL CALC-MCNC: 138.6 MG/DL (ref 63–159)
LYMPHOCYTES # BLD AUTO: 1.1 K/UL (ref 1–4.8)
LYMPHOCYTES NFR BLD: 25.7 % (ref 18–48)
MCH RBC QN AUTO: 31 PG (ref 27–31)
MCHC RBC AUTO-ENTMCNC: 31.5 G/DL (ref 32–36)
MCV RBC AUTO: 98 FL (ref 82–98)
MONOCYTES # BLD AUTO: 0.4 K/UL (ref 0.3–1)
MONOCYTES NFR BLD: 8.3 % (ref 4–15)
NEUTROPHILS # BLD AUTO: 2.8 K/UL (ref 1.8–7.7)
NEUTROPHILS NFR BLD: 63.9 % (ref 38–73)
NONHDLC SERPL-MCNC: 168 MG/DL
NRBC BLD-RTO: 0 /100 WBC
PLATELET # BLD AUTO: 212 K/UL (ref 150–350)
PMV BLD AUTO: 9.1 FL (ref 9.2–12.9)
POTASSIUM SERPL-SCNC: 3.7 MMOL/L (ref 3.5–5.1)
PROT SERPL-MCNC: 7.1 G/DL (ref 6–8.4)
RBC # BLD AUTO: 3.97 M/UL (ref 4–5.4)
SODIUM SERPL-SCNC: 141 MMOL/L (ref 136–145)
TRIGL SERPL-MCNC: 147 MG/DL (ref 30–150)
WBC # BLD AUTO: 4.35 K/UL (ref 3.9–12.7)

## 2020-10-12 PROCEDURE — 86334 IMMUNOFIX E-PHORESIS SERUM: CPT

## 2020-10-12 PROCEDURE — 85652 RBC SED RATE AUTOMATED: CPT

## 2020-10-12 PROCEDURE — 86334 IMMUNOFIX E-PHORESIS SERUM: CPT | Mod: 26,,, | Performed by: PATHOLOGY

## 2020-10-12 PROCEDURE — 86140 C-REACTIVE PROTEIN: CPT

## 2020-10-12 PROCEDURE — 84165 PATHOLOGIST INTERPRETATION SPE: ICD-10-PCS | Mod: 26,,, | Performed by: PATHOLOGY

## 2020-10-12 PROCEDURE — 85025 COMPLETE CBC W/AUTO DIFF WBC: CPT

## 2020-10-12 PROCEDURE — 80061 LIPID PANEL: CPT

## 2020-10-12 PROCEDURE — 84165 PROTEIN E-PHORESIS SERUM: CPT

## 2020-10-12 PROCEDURE — 84165 PROTEIN E-PHORESIS SERUM: CPT | Mod: 26,,, | Performed by: PATHOLOGY

## 2020-10-12 PROCEDURE — 86334 PATHOLOGIST INTERPRETATION IFE: ICD-10-PCS | Mod: 26,,, | Performed by: PATHOLOGY

## 2020-10-12 PROCEDURE — 80053 COMPREHEN METABOLIC PANEL: CPT

## 2020-10-13 ENCOUNTER — PATIENT MESSAGE (OUTPATIENT)
Dept: RHEUMATOLOGY | Facility: CLINIC | Age: 70
End: 2020-10-13

## 2020-10-13 LAB
ALBUMIN SERPL ELPH-MCNC: 3.98 G/DL (ref 3.35–5.55)
ALPHA1 GLOB SERPL ELPH-MCNC: 0.3 G/DL (ref 0.17–0.41)
ALPHA2 GLOB SERPL ELPH-MCNC: 0.86 G/DL (ref 0.43–0.99)
B-GLOBULIN SERPL ELPH-MCNC: 0.64 G/DL (ref 0.5–1.1)
GAMMA GLOB SERPL ELPH-MCNC: 0.82 G/DL (ref 0.67–1.58)
INTERPRETATION SERPL IFE-IMP: NORMAL
PROT SERPL-MCNC: 6.6 G/DL (ref 6–8.4)

## 2020-10-14 ENCOUNTER — PATIENT MESSAGE (OUTPATIENT)
Dept: RHEUMATOLOGY | Facility: CLINIC | Age: 70
End: 2020-10-14

## 2020-10-14 LAB
PATHOLOGIST INTERPRETATION IFE: NORMAL
PATHOLOGIST INTERPRETATION SPE: NORMAL

## 2020-10-19 ENCOUNTER — OFFICE VISIT (OUTPATIENT)
Dept: RHEUMATOLOGY | Facility: CLINIC | Age: 70
End: 2020-10-19
Payer: MEDICARE

## 2020-10-19 VITALS
TEMPERATURE: 98 F | BODY MASS INDEX: 31.32 KG/M2 | DIASTOLIC BLOOD PRESSURE: 68 MMHG | HEIGHT: 69 IN | SYSTOLIC BLOOD PRESSURE: 129 MMHG | HEART RATE: 69 BPM | WEIGHT: 211.44 LBS

## 2020-10-19 DIAGNOSIS — I27.20 PULMONARY HYPERTENSION: ICD-10-CM

## 2020-10-19 DIAGNOSIS — E66.9 CLASS 1 OBESITY WITH BODY MASS INDEX (BMI) OF 31.0 TO 31.9 IN ADULT, UNSPECIFIED OBESITY TYPE, UNSPECIFIED WHETHER SERIOUS COMORBIDITY PRESENT: ICD-10-CM

## 2020-10-19 DIAGNOSIS — R53.83 FATIGUE, UNSPECIFIED TYPE: ICD-10-CM

## 2020-10-19 DIAGNOSIS — J84.9 ILD (INTERSTITIAL LUNG DISEASE): ICD-10-CM

## 2020-10-19 DIAGNOSIS — M35.00 SJOGREN'S SYNDROME, WITH UNSPECIFIED ORGAN INVOLVEMENT: Primary | ICD-10-CM

## 2020-10-19 DIAGNOSIS — R70.0 ELEVATED SED RATE: ICD-10-CM

## 2020-10-19 PROCEDURE — 3008F BODY MASS INDEX DOCD: CPT | Mod: CPTII,S$GLB,, | Performed by: INTERNAL MEDICINE

## 2020-10-19 PROCEDURE — 99214 PR OFFICE/OUTPT VISIT, EST, LEVL IV, 30-39 MIN: ICD-10-PCS | Mod: S$GLB,,, | Performed by: INTERNAL MEDICINE

## 2020-10-19 PROCEDURE — 99999 PR PBB SHADOW E&M-EST. PATIENT-LVL III: CPT | Mod: PBBFAC,,, | Performed by: INTERNAL MEDICINE

## 2020-10-19 PROCEDURE — 99999 PR PBB SHADOW E&M-EST. PATIENT-LVL III: ICD-10-PCS | Mod: PBBFAC,,, | Performed by: INTERNAL MEDICINE

## 2020-10-19 PROCEDURE — 1159F MED LIST DOCD IN RCRD: CPT | Mod: S$GLB,,, | Performed by: INTERNAL MEDICINE

## 2020-10-19 PROCEDURE — 1101F PT FALLS ASSESS-DOCD LE1/YR: CPT | Mod: CPTII,S$GLB,, | Performed by: INTERNAL MEDICINE

## 2020-10-19 PROCEDURE — 1125F AMNT PAIN NOTED PAIN PRSNT: CPT | Mod: S$GLB,,, | Performed by: INTERNAL MEDICINE

## 2020-10-19 PROCEDURE — 1159F PR MEDICATION LIST DOCUMENTED IN MEDICAL RECORD: ICD-10-PCS | Mod: S$GLB,,, | Performed by: INTERNAL MEDICINE

## 2020-10-19 PROCEDURE — 99214 OFFICE O/P EST MOD 30 MIN: CPT | Mod: S$GLB,,, | Performed by: INTERNAL MEDICINE

## 2020-10-19 PROCEDURE — 1101F PR PT FALLS ASSESS DOC 0-1 FALLS W/OUT INJ PAST YR: ICD-10-PCS | Mod: CPTII,S$GLB,, | Performed by: INTERNAL MEDICINE

## 2020-10-19 PROCEDURE — 1125F PR PAIN SEVERITY QUANTIFIED, PAIN PRESENT: ICD-10-PCS | Mod: S$GLB,,, | Performed by: INTERNAL MEDICINE

## 2020-10-19 PROCEDURE — 3008F PR BODY MASS INDEX (BMI) DOCUMENTED: ICD-10-PCS | Mod: CPTII,S$GLB,, | Performed by: INTERNAL MEDICINE

## 2020-10-19 ASSESSMENT — ROUTINE ASSESSMENT OF PATIENT INDEX DATA (RAPID3)
FATIGUE SCORE: 3
PAIN SCORE: 2
TOTAL RAPID3 SCORE: 1.89
WHEN YOU AWAKENED IN THE MORNING OVER THE LAST WEEK, PLEASE INDICATE THE AMOUNT OF TIME IT TAKES UNTIL YOU ARE AS LIMBER AS YOU WILL BE FOR THE DAY: 1 HOUR
PATIENT GLOBAL ASSESSMENT SCORE: 3
MDHAQ FUNCTION SCORE: 0.2
AM STIFFNESS SCORE: 1, YES

## 2020-10-19 NOTE — PROGRESS NOTES
Rapid3 Question Responses and Scores 10/17/2020   MDHAQ Score 0.2   Psychologic Score 3.3   Pain Score 2   When you awakened in the morning OVER THE LAST WEEK, did you feel stiff? Yes   If Yes, please indicate the number of hours until you are as limber as you will be for the day 1   Fatigue Score 3   Global Health Score 3   RAPID3 Score 1.88

## 2020-10-19 NOTE — PROGRESS NOTES
Subjective:       Patient ID: Bharti Woodard is a 70 y.o. female.    Chief Complaint: Sjogrens and ILD    HPI:  Bharti Woodard is a 70 y.o. female with history of Sjogrens diagnosed 50-59 yo. She has difficulty remembering history.   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  Dr. Aldridge at [facility] saw her and diagnosed with lupus and gave prednisone and Plaquenil but she gained 60 pounds.    Saw Dr. Tirado a rheumatologist who diagnosed Sjogrens and sent her a pulmonologist.  Dr. Tirado gave her Mycophenolate.  Pulmonary HTN diagnosed 2010 with Dr. Evans.  [place]. Cassi Nunez [place] [facility] [place] and was planning to cut down on medication in future.  Patient decided to lower in her own so they felt she was noncompliant.  Patient decided to switch.   Saw Dr. Dunne who did not want to treat her since care needed to be coordinated with rheumatologist.  So she saw Dr. Cabrera and Dr. Herbert.      January 2020 felt very sick.  Tested negative for flu.  She was given antibiotics.  Since then has been fatigued with intermittent joint pains as well as tingling in her feet.     Tylenol 650 mg three times daily helps pain in neck, feet, ankle and hands.  Today 2/10 ache today.  Nothing worsens pain.  Pain began after URI treated in January 2020.       Review of chart shows that patient was seen on 2/15/2005 by Dr. Lenora Ackerman in rheumatology for a second opinion.  Per that note patient had persistently elevate sedimentation rate and was diagnosed as PMR by Dr. Aldridge who treated her with prednisone that was later stopped.  Plaquenil was given but caused all over body rash.    Care Everywhere:  1/29/2019 Dr. Herbert note  Biopsy:   Biopsy at  10/2012: diffuse scattered non-necrotizing granulomas (special stains negative) with some areas of fibrosis, but mostly normal lung; scattered lymphoid aggregates    Labs:  November 2014: SSA positive  VENITA 1:1280, positive dsDNA, centromere and Sm/RNP, negative Sm,  "Scl70, RF, SSB       INTERVAL HISTORY:    Doing well.  Some stress.   Saw Dr. Willson who wrote:    -Cont Tadalafil (on Alyq)  -encouraged oxygen use with any level of exertion  -increased mycophenolate to 1000mg BID. FVC has been lower, along with DLCO.   -urged to increase exercise program; she reports being limited by feelings of depression. I asked her to speak with her PCP about medication options.   -cont lasix 20mg in the AM, 20mg in the PM. OK to sometimes take an extra dose  -needs testing updated (TTE, PFTs, 6MWT, BNP [check at [place], blood work to be done])              Review of Systems   Constitutional: Positive for fatigue. Negative for fever and unexpected weight change.   HENT: Positive for mouth sores. Negative for trouble swallowing.    Eyes: Negative for redness.   Respiratory: Positive for shortness of breath (sleeps with 4 L on CPAP and 2-3 L when awake). Negative for cough.    Cardiovascular: Negative for chest pain.   Gastrointestinal: Negative for constipation and diarrhea.   Endocrine: Negative.    Genitourinary: Negative for dysuria and genital sores.   Musculoskeletal: Positive for arthralgias (Lateral foot and ankle pain.  Right knee pain diagnosed with bursitis by ortho who gave NSAID but made her sick so now taking Tylenol.).   Skin: Negative for rash.   Neurological: Negative for headaches.   Hematological: Does not bruise/bleed easily.         Objective:   /68   Pulse 69   Temp 98 °F (36.7 °C) (Oral)   Ht 5' 9" (1.753 m)   Wt 95.9 kg (211 lb 6.7 oz)   LMP 01/01/1976   BMI 31.22 kg/m²      Physical Exam   Constitutional: She is oriented to person, place, and time and well-developed, well-nourished, and in no distress.   HENT:   Head: Normocephalic and atraumatic.   Mask in place   Eyes: Conjunctivae and EOM are normal.   Good tear production   Neck: Normal range of motion. Neck supple.   Cardiovascular: Normal rate, regular rhythm and normal heart sounds.    Pulmonary/Chest: " Effort normal and breath sounds normal.   Abdominal: Soft. Bowel sounds are normal.   Neurological: She is alert and oriented to person, place, and time. Gait normal.   Skin: Skin is warm and dry.     Psychiatric: Mood and affect normal.   Musculoskeletal:      Comments: 28 joint count: 0 swollen and 0 tender         LABS    Component      Latest Ref Rng & Units 10/12/2020   WBC      3.90 - 12.70 K/uL 4.35   RBC      4.00 - 5.40 M/uL 3.97 (L)   Hemoglobin      12.0 - 16.0 g/dL 12.3   Hematocrit      37.0 - 48.5 % 39.0   MCV      82 - 98 fL 98   MCH      27.0 - 31.0 pg 31.0   MCHC      32.0 - 36.0 g/dL 31.5 (L)   RDW      11.5 - 14.5 % 12.4   Platelets      150 - 350 K/uL 212   MPV      9.2 - 12.9 fL 9.1 (L)   Immature Granulocytes      0.0 - 0.5 % 0.5   Gran # (ANC)      1.8 - 7.7 K/uL 2.8   Immature Grans (Abs)      0.00 - 0.04 K/uL 0.02   Lymph #      1.0 - 4.8 K/uL 1.1   Mono #      0.3 - 1.0 K/uL 0.4   Eos #      0.0 - 0.5 K/uL 0.1   Baso #      0.00 - 0.20 K/uL 0.01   nRBC      0 /100 WBC 0   Gran%      38.0 - 73.0 % 63.9   Lymph%      18.0 - 48.0 % 25.7   Mono%      4.0 - 15.0 % 8.3   Eosinophil%      0.0 - 8.0 % 1.4   Basophil%      0.0 - 1.9 % 0.2   Differential Method       Automated   Sodium      136 - 145 mmol/L 141   Potassium      3.5 - 5.1 mmol/L 3.7   Chloride      95 - 110 mmol/L 101   CO2      23 - 29 mmol/L 29   Glucose      70 - 110 mg/dL 93   BUN, Bld      8 - 23 mg/dL 13   Creatinine      0.5 - 1.4 mg/dL 0.9   Calcium      8.7 - 10.5 mg/dL 9.5   PROTEIN TOTAL      6.0 - 8.4 g/dL 7.1   Albumin      3.5 - 5.2 g/dL 3.9   BILIRUBIN TOTAL      0.1 - 1.0 mg/dL 0.2   Alkaline Phosphatase      55 - 135 U/L 85   AST      10 - 40 U/L 14   ALT      10 - 44 U/L 9 (L)   Anion Gap      8 - 16 mmol/L 11   eGFR if African American      >60 mL/min/1.73 m:2 >60   eGFR if non African American      >60 mL/min/1.73 m:2 >60   Cholesterol      120 - 199 mg/dL 213 (H)   Triglycerides      30 - 150 mg/dL 147   HDL       40 - 75 mg/dL 45   LDL Cholesterol External      63.0 - 159.0 mg/dL 138.6   Hdl/Cholesterol Ratio      20.0 - 50.0 % 21.1   Total Cholesterol/HDL Ratio      2.0 - 5.0 4.7   Non-HDL Cholesterol      mg/dL 168   Protein, Serum      6.0 - 8.4 g/dL 6.6   Albumin grams/dl      3.35 - 5.55 g/dL 3.98   Alpha-1 grams/dl      0.17 - 0.41 g/dL 0.30   Alpha-2 grams/dl      0.43 - 0.99 g/dL 0.86   Beta grams/dl      0.50 - 1.10 g/dL 0.64   Gamma grams/dl      0.67 - 1.58 g/dL 0.82   BNP      0 - 99 pg/mL 27   Sed Rate      0 - 20 mm/Hr 67 (H)   CRP      0.0 - 8.2 mg/L 6.4   Immunofix Interp.       SEE COMMENT   Pathologist Interpretation GURPREET       REVIEWED   Pathologist Interpretation SPE       REVIEWED          Assessment:       1. Sjogern's disease.  Uses Restasis  2. Pulmonary HTN  3. Interstitial lung disease.  Currently on mycophenolate 1,000 mg bid managed by pulmonary Dr. Willson.  Will see again in January with PFT, walk test and echo.   4. Joint pain.  Hands, feet and neck.   5. Stress/Depression.  Encourage patient to speak with primary  6. Immunosuppression  7. Former smoker.  3 packs a day for five years.  Quit many years ago    Plan:       1.  Labs  2.  Await pulmonary work up.  3.  Counselor to deal with stress of pandemic and life     RTO 3 months/prn

## 2020-11-09 ENCOUNTER — PATIENT MESSAGE (OUTPATIENT)
Dept: OBSTETRICS AND GYNECOLOGY | Facility: CLINIC | Age: 70
End: 2020-11-09

## 2020-11-09 NOTE — TELEPHONE ENCOUNTER
Because she is having a problem she needs to be seen and she will no longer qualify for a screening MMG  Held private slot at 2:45 first [facility] on Wed 11/11 for her

## 2020-11-09 NOTE — TELEPHONE ENCOUNTER
Patient notified and verbalized but states cannot make it here on Wednesday @ 2:45 because she has another appointment at [address] [place] [address].  Notified I will have to discuss with Dr. Ortez and get back with her.  Patient verbalized understanding

## 2020-11-09 NOTE — TELEPHONE ENCOUNTER
Please advise on patient's request for mammogram or does she need to be seen first due to pain and itch of the right breast

## 2020-11-10 NOTE — TELEPHONE ENCOUNTER
----- Message from Lashawn Rivas sent at 11/10/2020  7:07 AM CST -----  Type:  Patient Returning Call    Who Called: Patient  Who Left Message for Patient: Rosa  Does the patient know what this is regarding?: Unknown  Would the patient rather a call back or a response via [facility]? Call back  Best Call Back Number:   Additional Information: n/a

## 2020-11-16 ENCOUNTER — OFFICE VISIT (OUTPATIENT)
Dept: OBSTETRICS AND GYNECOLOGY | Facility: CLINIC | Age: 70
End: 2020-11-16
Payer: MEDICARE

## 2020-11-16 VITALS
BODY MASS INDEX: 31.57 KG/M2 | SYSTOLIC BLOOD PRESSURE: 118 MMHG | WEIGHT: 213.13 LBS | DIASTOLIC BLOOD PRESSURE: 64 MMHG | HEIGHT: 69 IN

## 2020-11-16 DIAGNOSIS — L30.9 DERMATITIS: Primary | ICD-10-CM

## 2020-11-16 PROCEDURE — 1126F AMNT PAIN NOTED NONE PRSNT: CPT | Mod: S$GLB,,, | Performed by: OBSTETRICS & GYNECOLOGY

## 2020-11-16 PROCEDURE — 3008F PR BODY MASS INDEX (BMI) DOCUMENTED: ICD-10-PCS | Mod: CPTII,S$GLB,, | Performed by: OBSTETRICS & GYNECOLOGY

## 2020-11-16 PROCEDURE — 1159F MED LIST DOCD IN RCRD: CPT | Mod: S$GLB,,, | Performed by: OBSTETRICS & GYNECOLOGY

## 2020-11-16 PROCEDURE — 1101F PR PT FALLS ASSESS DOC 0-1 FALLS W/OUT INJ PAST YR: ICD-10-PCS | Mod: CPTII,S$GLB,, | Performed by: OBSTETRICS & GYNECOLOGY

## 2020-11-16 PROCEDURE — 99999 PR PBB SHADOW E&M-EST. PATIENT-LVL III: CPT | Mod: PBBFAC,,, | Performed by: OBSTETRICS & GYNECOLOGY

## 2020-11-16 PROCEDURE — 99213 OFFICE O/P EST LOW 20 MIN: CPT | Mod: S$GLB,,, | Performed by: OBSTETRICS & GYNECOLOGY

## 2020-11-16 PROCEDURE — 3288F PR FALLS RISK ASSESSMENT DOCUMENTED: ICD-10-PCS | Mod: CPTII,S$GLB,, | Performed by: OBSTETRICS & GYNECOLOGY

## 2020-11-16 PROCEDURE — 3288F FALL RISK ASSESSMENT DOCD: CPT | Mod: CPTII,S$GLB,, | Performed by: OBSTETRICS & GYNECOLOGY

## 2020-11-16 PROCEDURE — 1101F PT FALLS ASSESS-DOCD LE1/YR: CPT | Mod: CPTII,S$GLB,, | Performed by: OBSTETRICS & GYNECOLOGY

## 2020-11-16 PROCEDURE — 99213 PR OFFICE/OUTPT VISIT, EST, LEVL III, 20-29 MIN: ICD-10-PCS | Mod: S$GLB,,, | Performed by: OBSTETRICS & GYNECOLOGY

## 2020-11-16 PROCEDURE — 99999 PR PBB SHADOW E&M-EST. PATIENT-LVL III: ICD-10-PCS | Mod: PBBFAC,,, | Performed by: OBSTETRICS & GYNECOLOGY

## 2020-11-16 PROCEDURE — 1126F PR PAIN SEVERITY QUANTIFIED, NO PAIN PRESENT: ICD-10-PCS | Mod: S$GLB,,, | Performed by: OBSTETRICS & GYNECOLOGY

## 2020-11-16 PROCEDURE — 3008F BODY MASS INDEX DOCD: CPT | Mod: CPTII,S$GLB,, | Performed by: OBSTETRICS & GYNECOLOGY

## 2020-11-16 PROCEDURE — 1159F PR MEDICATION LIST DOCUMENTED IN MEDICAL RECORD: ICD-10-PCS | Mod: S$GLB,,, | Performed by: OBSTETRICS & GYNECOLOGY

## 2020-11-16 RX ORDER — TRIAMCINOLONE ACETONIDE 1 MG/G
1 PASTE DENTAL DAILY PRN
COMMUNITY
Start: 2020-08-13

## 2020-11-16 RX ORDER — BETAMETHASONE DIPROPIONATE 0.5 MG/G
CREAM TOPICAL 2 TIMES DAILY
Qty: 15 G | Refills: 0 | Status: SHIPPED | OUTPATIENT
Start: 2020-11-16 | End: 2021-08-11

## 2020-11-16 NOTE — PROGRESS NOTES
"  OBSTETRIC HISTORY:   OB History        3    Para   3    Term   3            AB        Living   3       SAB        TAB        Ectopic        Multiple        Live Births   3                  PAP History: Denies abnormal Paps.  Infection History: Denies STDs. Denies PID.  Benign History: Denies uterine fibroids. Denies ovarian cysts. Denies endometriosis.   Cancer History: Denies cervical cancer. Denies uterine cancer or hyperplasia. Denies ovarian cancer. Denies vulvar cancer or pre-cancer. Denies vaginal cancer or pre-cancer. Denies breast cancer. Denies colon cancer.  Sexual Activity History:not sexually active  S/P hysterectomy      HPI:   70 y.o.  Patient's last menstrual period was 1976.   Patient is  here for breast itching on the right x several months but then started hurting especially around the nipple area. She has been rubbing it a lot. She denies bladder and bowel complaints.    ROS:  GENERAL: Denies weight gain or weight loss. Feeling well overall.   SKIN: Denies rash or lesions.   HEAD: Denies headache.   NODES: Denies enlarged lymph nodes.   CHEST: Denies shortness of breath.   ABDOMEN: No abdominal pain, constipation, diarrhea, nausea, vomiting or rectal bleeding.   URINARY: No frequency, dysuria, hematuria, or burning on urination.  REPRODUCTIVE: See HPI.   BREASTS: See HPI.   HEMATOLOGIC: No easy bruisability.   MUSCULOSKELETAL: Denies joint pain or back pain.   NEUROLOGIC: Denies weakness.   PSYCHIATRIC: Denies depression, anxiety or mood swings.    PE:   /64   Ht 5' 9" (1.753 m)   Wt 96.7 kg (213 lb 1.6 oz)   LMP 1976   BMI 31.47 kg/m²   PE:   APPEARANCE: Well nourished, well developed, in no acute distress.  CHEST: Lungs clear to auscultation.  HEART: Regular rate and rhythm, no murmurs, rubs or gallops.  ABDOMEN: Soft. No tenderness or masses. No hernias.  BREASTS: Symmetrical, hyperkeratosis of the right areola from 4-8 o'clock area but no erythema. " Bilateral fibrocystic changes. No palpable masses, nipple discharge or adenopathy bilaterally.    ASSESSMENT:  Contact dermatitis vs Eczema vs other causes-- rule out Paget's although less likely      PLAN:  Steroid cream x 2 weeks rto 2 weeks to recheck biopsy if not improved

## 2020-12-03 ENCOUNTER — OFFICE VISIT (OUTPATIENT)
Dept: OBSTETRICS AND GYNECOLOGY | Facility: CLINIC | Age: 70
End: 2020-12-03
Payer: MEDICARE

## 2020-12-03 VITALS
WEIGHT: 210.31 LBS | SYSTOLIC BLOOD PRESSURE: 118 MMHG | BODY MASS INDEX: 31.15 KG/M2 | HEIGHT: 69 IN | DIASTOLIC BLOOD PRESSURE: 67 MMHG

## 2020-12-03 DIAGNOSIS — Z01.419 ENCOUNTER FOR GYNECOLOGICAL EXAMINATION WITHOUT ABNORMAL FINDING: Primary | ICD-10-CM

## 2020-12-03 DIAGNOSIS — Z12.31 SCREENING MAMMOGRAM, ENCOUNTER FOR: ICD-10-CM

## 2020-12-03 PROCEDURE — 99999 PR PBB SHADOW E&M-EST. PATIENT-LVL III: CPT | Mod: PBBFAC,,, | Performed by: OBSTETRICS & GYNECOLOGY

## 2020-12-03 PROCEDURE — 1101F PT FALLS ASSESS-DOCD LE1/YR: CPT | Mod: CPTII,S$GLB,, | Performed by: OBSTETRICS & GYNECOLOGY

## 2020-12-03 PROCEDURE — 99999 PR PBB SHADOW E&M-EST. PATIENT-LVL III: ICD-10-PCS | Mod: PBBFAC,,, | Performed by: OBSTETRICS & GYNECOLOGY

## 2020-12-03 PROCEDURE — 3288F FALL RISK ASSESSMENT DOCD: CPT | Mod: CPTII,S$GLB,, | Performed by: OBSTETRICS & GYNECOLOGY

## 2020-12-03 PROCEDURE — 1101F PR PT FALLS ASSESS DOC 0-1 FALLS W/OUT INJ PAST YR: ICD-10-PCS | Mod: CPTII,S$GLB,, | Performed by: OBSTETRICS & GYNECOLOGY

## 2020-12-03 PROCEDURE — 1126F PR PAIN SEVERITY QUANTIFIED, NO PAIN PRESENT: ICD-10-PCS | Mod: S$GLB,,, | Performed by: OBSTETRICS & GYNECOLOGY

## 2020-12-03 PROCEDURE — 3008F PR BODY MASS INDEX (BMI) DOCUMENTED: ICD-10-PCS | Mod: CPTII,S$GLB,, | Performed by: OBSTETRICS & GYNECOLOGY

## 2020-12-03 PROCEDURE — G0101 CA SCREEN;PELVIC/BREAST EXAM: HCPCS | Mod: S$GLB,,, | Performed by: OBSTETRICS & GYNECOLOGY

## 2020-12-03 PROCEDURE — 1126F AMNT PAIN NOTED NONE PRSNT: CPT | Mod: S$GLB,,, | Performed by: OBSTETRICS & GYNECOLOGY

## 2020-12-03 PROCEDURE — 3288F PR FALLS RISK ASSESSMENT DOCUMENTED: ICD-10-PCS | Mod: CPTII,S$GLB,, | Performed by: OBSTETRICS & GYNECOLOGY

## 2020-12-03 PROCEDURE — G0101 PR CA SCREEN;PELVIC/BREAST EXAM: ICD-10-PCS | Mod: S$GLB,,, | Performed by: OBSTETRICS & GYNECOLOGY

## 2020-12-03 PROCEDURE — 3008F BODY MASS INDEX DOCD: CPT | Mod: CPTII,S$GLB,, | Performed by: OBSTETRICS & GYNECOLOGY

## 2020-12-03 NOTE — PROGRESS NOTES
"  OBSTETRIC HISTORY:   OB History        3    Para   3    Term   3            AB        Living   3       SAB        TAB        Ectopic        Multiple        Live Births   3                  PAP History: Denies abnormal Paps.  Infection History: Denies STDs. Denies PID.  Benign History: Denies uterine fibroids. Denies ovarian cysts. Denies endometriosis.   Cancer History: Denies cervical cancer. Denies uterine cancer or hyperplasia. Denies ovarian cancer. Denies vulvar cancer or pre-cancer. Denies vaginal cancer or pre-cancer. Denies breast cancer. Denies colon cancer.  Sexual Activity History:not sexually active  S/P hysterectomy      HPI:   70 y.o. Patient's last menstrual period was 1976.   Patient is  here for her Medicare pelvic and breast exam and breast itching follow up. She states the itching lessened especially once she switched bras and got one that had a different material. She used the steroid cream. She reports the itching is at unusual times.     ROS:  GENERAL: Denies weight gain or weight loss. Feeling well overall.   SKIN: Denies rash or lesions.   HEAD: Denies headache.   NODES: Denies enlarged lymph nodes.   CHEST: Denies shortness of breath.   ABDOMEN: No abdominal pain, constipation, diarrhea, nausea, vomiting or rectal bleeding.   URINARY: No frequency, dysuria, hematuria, or burning on urination.  REPRODUCTIVE: See HPI.   BREASTS: The patient denies pain, lumps, or nipple discharge.   HEMATOLOGIC: No easy bruisability.   MUSCULOSKELETAL: Denies joint pain or back pain.   NEUROLOGIC: Denies weakness.   PSYCHIATRIC: Denies depression, anxiety or mood swings.        PE:   /67   Ht 5' 9" (1.753 m)   Wt 95.4 kg (210 lb 4.8 oz)   LMP 1976   BMI 31.06 kg/m²   APPEARANCE: Well nourished, well developed, in no acute distress.  NECK: Neck symmetric without thyromegaly.  NODES: No inguinal or cervical lymph node enlargement.  CHEST: Lungs clear to " auscultation.  HEART: Regular rate and rhythm, no murmurs, rubs or gallops.  ABDOMEN: Soft. No tenderness or masses. No hernias.  BREASTS: Symmetrical, no skin changes or visible lesions. No palpable masses, nipple discharge or adenopathy bilaterally.  VULVA: No lesions. Normal female genitalia.  URETHRAL MEATUS: Normal size and location, no lesions, no prolapse.  URETHRA: No masses, tenderness, prolapse or scarring.  VAGINA: Atrophic and not well rugated, no discharge, no significant cystocele or rectocele.  CERVIX AND UTERUS SURGICALLY ABSENT.   ADNEXA: No masses or tenderness.    PROCEDURES:  Pap smear -- NOT INDICATED    Assessment/Plan:  Medicare pelvic and breast exam  Contact dermatitis of breast most likely as improved with changing bra (do not think this is Paget's and there has never been any visible lesion)-- use steroid cream 2-3 times weekly prn

## 2020-12-21 ENCOUNTER — OFFICE VISIT (OUTPATIENT)
Dept: URGENT CARE | Facility: CLINIC | Age: 70
End: 2020-12-21
Payer: MEDICARE

## 2020-12-21 VITALS
OXYGEN SATURATION: 92 % | WEIGHT: 208 LBS | TEMPERATURE: 98 F | SYSTOLIC BLOOD PRESSURE: 109 MMHG | DIASTOLIC BLOOD PRESSURE: 59 MMHG | HEART RATE: 70 BPM | BODY MASS INDEX: 30.81 KG/M2 | HEIGHT: 69 IN

## 2020-12-21 DIAGNOSIS — J06.9 UPPER RESPIRATORY TRACT INFECTION, UNSPECIFIED TYPE: Primary | ICD-10-CM

## 2020-12-21 LAB
CTP QC/QA: YES
SARS-COV-2 RDRP RESP QL NAA+PROBE: NEGATIVE

## 2020-12-21 PROCEDURE — U0002: ICD-10-PCS | Mod: QW,S$GLB,, | Performed by: FAMILY MEDICINE

## 2020-12-21 PROCEDURE — 99214 OFFICE O/P EST MOD 30 MIN: CPT | Mod: S$GLB,CS,, | Performed by: FAMILY MEDICINE

## 2020-12-21 PROCEDURE — 3008F BODY MASS INDEX DOCD: CPT | Mod: CPTII,S$GLB,, | Performed by: FAMILY MEDICINE

## 2020-12-21 PROCEDURE — 99214 PR OFFICE/OUTPT VISIT, EST, LEVL IV, 30-39 MIN: ICD-10-PCS | Mod: S$GLB,CS,, | Performed by: FAMILY MEDICINE

## 2020-12-21 PROCEDURE — U0002 COVID-19 LAB TEST NON-CDC: HCPCS | Mod: QW,S$GLB,, | Performed by: FAMILY MEDICINE

## 2020-12-21 PROCEDURE — 3008F PR BODY MASS INDEX (BMI) DOCUMENTED: ICD-10-PCS | Mod: CPTII,S$GLB,, | Performed by: FAMILY MEDICINE

## 2020-12-21 NOTE — PROGRESS NOTES
"Subjective:       Patient ID: Bharti Woodard is a 70 y.o. female.    Vitals:  height is 5' 9" (1.753 m) and weight is 94.3 kg (208 lb). Her oral temperature is 98.2 °F (36.8 °C). Her blood pressure is 109/59 (abnormal) and her pulse is 70. Her oxygen saturation is 92% (abnormal).     Chief Complaint: Sinusitis    Patient presents with runny noses, and ear congestion that started Friday morning. Requesting covid test.     Sinus Problem  This is a new problem. The current episode started in the past 7 days. The problem is unchanged. There has been no fever. She is experiencing no pain. Associated symptoms include congestion, ear pain and sinus pressure. Pertinent negatives include no chills, coughing, diaphoresis, shortness of breath or sore throat. Past treatments include nothing. The treatment provided no relief.       Constitution: Negative for chills, sweating, fatigue and fever.   HENT: Positive for ear pain, congestion, sinus pain and sinus pressure. Negative for sore throat and voice change.    Neck: Negative for painful lymph nodes.   Eyes: Positive for eye redness.   Respiratory: Negative for chest tightness, cough, sputum production, bloody sputum, COPD, shortness of breath, stridor, wheezing and asthma.    Gastrointestinal: Negative for nausea and vomiting.   Musculoskeletal: Negative for muscle ache.   Skin: Negative for rash.   Allergic/Immunologic: Negative for seasonal allergies and asthma.   Hematologic/Lymphatic: Negative for swollen lymph nodes.       Objective:      Physical Exam   HENT:   Head: Normocephalic and atraumatic.   Nose: Congestion present.   Mouth/Throat: Mucous membranes are moist.   Eyes: Pupils are equal, round, and reactive to light. extraocular movement intact  Neck: Normal range of motion. Neck supple.   Cardiovascular: Normal rate, regular rhythm, normal heart sounds and normal pulses.   Pulmonary/Chest: Effort normal and breath sounds normal.   Abdominal: Soft. Normal appearance. "   Neurological: She is alert.   Nursing note and vitals reviewed.    Results for orders placed or performed in visit on 12/21/20   POCT COVID-19 Rapid Screening   Result Value Ref Range    POC Rapid COVID Negative Negative     Acceptable Yes          Assessment:       1. Upper respiratory tract infection, unspecified type        Plan:         Upper respiratory tract infection, unspecified type  -     POCT COVID-19 Rapid Screening    continue with claritin-D

## 2020-12-21 NOTE — PATIENT INSTRUCTIONS
Viral Upper Respiratory Illness (Adult)  You have a viral upper respiratory illness (URI), which is another term for the common cold. This illness is contagious during the first few days. It is spread through the air by coughing and sneezing. It may also be spread by direct contact (touching the sick person and then touching your own eyes, nose, or mouth). Frequent handwashing will decrease risk of spread. Most viral illnesses go away within 7 to 10 days with rest and simple home remedies. Sometimes the illness may last for several weeks. Antibiotics will not kill a virus, and they are generally not prescribed for this condition.    Home care  · If symptoms are severe, rest at home for the first 2 to 3 days. When you resume activity, don't let yourself get too tired.  · Avoid being exposed to cigarette smoke (yours or others).  · You may use acetaminophen or ibuprofen to control pain and fever, unless another medicine was prescribed. (Note: If you have chronic liver or kidney disease, have ever had a stomach ulcer or gastrointestinal bleeding, or are taking blood-thinning medicines, talk with your healthcare provider before using these medicines.) Aspirin should never be given to anyone under 18 years of age who is ill with a viral infection or fever. It may cause severe liver or brain damage.  · Your appetite may be poor, so a light diet is fine. Avoid dehydration by drinking 6 to 8 glasses of fluids per day (water, soft drinks, juices, tea, or soup). Extra fluids will help loosen secretions in the nose and lungs.  · Over-the-counter cold medicines will not shorten the length of time youre sick, but they may be helpful for the following symptoms: cough, sore throat, and nasal and sinus congestion. (Note: Do not use decongestants if you have high blood pressure.)  Follow-up care  Follow up with your healthcare provider, or as advised.  When to seek medical advice  Call your healthcare provider right away if any  of these occur:  · Cough with lots of colored sputum (mucus)  · Severe headache; face, neck, or ear pain  · Difficulty swallowing due to throat pain  · Fever of 100.4°F (38°C)  Call 911, or get immediate medical care  Call emergency services right away if any of these occur:  · Chest pain, shortness of breath, wheezing, or difficulty breathing  · Coughing up blood  · Inability to swallow due to throat pain  Date Last Reviewed: 9/13/2015  © 5867-2349 [employer]. [address] [place] [address]. All rights reserved. This information is not intended as a substitute for professional medical care. Always follow your healthcare professional's instructions.

## 2020-12-30 ENCOUNTER — HOSPITAL ENCOUNTER (OUTPATIENT)
Dept: RADIOLOGY | Facility: HOSPITAL | Age: 70
Discharge: HOME | End: 2020-12-30
Attending: OBSTETRICS & GYNECOLOGY
Payer: MEDICARE

## 2020-12-30 DIAGNOSIS — Z12.31 SCREENING MAMMOGRAM, ENCOUNTER FOR: ICD-10-CM

## 2020-12-30 PROCEDURE — 77063 BREAST TOMOSYNTHESIS BI: CPT | Mod: 26,,, | Performed by: RADIOLOGY

## 2020-12-30 PROCEDURE — 77067 SCR MAMMO BI INCL CAD: CPT | Mod: TC

## 2020-12-30 PROCEDURE — 77063 MAMMO DIGITAL SCREENING BILAT WITH TOMO: ICD-10-PCS | Mod: 26,,, | Performed by: RADIOLOGY

## 2020-12-30 PROCEDURE — 77067 MAMMO DIGITAL SCREENING BILAT WITH TOMO: ICD-10-PCS | Mod: 26,,, | Performed by: RADIOLOGY

## 2020-12-30 PROCEDURE — 77067 SCR MAMMO BI INCL CAD: CPT | Mod: 26,,, | Performed by: RADIOLOGY

## 2021-01-15 ENCOUNTER — IMMUNIZATION (OUTPATIENT)
Dept: FAMILY MEDICINE | Facility: CLINIC | Age: 71
End: 2021-01-15
Payer: MEDICARE

## 2021-01-15 DIAGNOSIS — Z23 NEED FOR VACCINATION: Primary | ICD-10-CM

## 2021-01-15 PROCEDURE — 91300 COVID-19, MRNA, LNP-S, PF, 30 MCG/0.3 ML DOSE VACCINE: CPT | Mod: PBBFAC | Performed by: FAMILY MEDICINE

## 2021-01-22 ENCOUNTER — LAB VISIT (OUTPATIENT)
Dept: LAB | Facility: HOSPITAL | Age: 71
End: 2021-01-22
Attending: INTERNAL MEDICINE
Payer: MEDICARE

## 2021-01-22 DIAGNOSIS — I27.20 PULMONARY HYPERTENSION: ICD-10-CM

## 2021-01-22 DIAGNOSIS — R53.83 FATIGUE, UNSPECIFIED TYPE: ICD-10-CM

## 2021-01-22 DIAGNOSIS — M35.00 SJOGREN'S SYNDROME, WITH UNSPECIFIED ORGAN INVOLVEMENT: ICD-10-CM

## 2021-01-22 DIAGNOSIS — J84.9 ILD (INTERSTITIAL LUNG DISEASE): ICD-10-CM

## 2021-01-22 DIAGNOSIS — R70.0 ELEVATED SED RATE: ICD-10-CM

## 2021-01-22 DIAGNOSIS — E66.9 CLASS 1 OBESITY WITH BODY MASS INDEX (BMI) OF 31.0 TO 31.9 IN ADULT, UNSPECIFIED OBESITY TYPE, UNSPECIFIED WHETHER SERIOUS COMORBIDITY PRESENT: ICD-10-CM

## 2021-01-22 LAB
ALBUMIN SERPL BCP-MCNC: 3.8 G/DL (ref 3.5–5.2)
ALP SERPL-CCNC: 85 U/L (ref 55–135)
ALT SERPL W/O P-5'-P-CCNC: 15 U/L (ref 10–44)
ANION GAP SERPL CALC-SCNC: 8 MMOL/L (ref 8–16)
AST SERPL-CCNC: 17 U/L (ref 10–40)
BASOPHILS # BLD AUTO: 0.02 K/UL (ref 0–0.2)
BASOPHILS NFR BLD: 0.4 % (ref 0–1.9)
BILIRUB SERPL-MCNC: 0.4 MG/DL (ref 0.1–1)
BUN SERPL-MCNC: 13 MG/DL (ref 8–23)
C3 SERPL-MCNC: 125 MG/DL (ref 50–180)
C4 SERPL-MCNC: 30 MG/DL (ref 11–44)
CALCIUM SERPL-MCNC: 9.6 MG/DL (ref 8.7–10.5)
CHLORIDE SERPL-SCNC: 100 MMOL/L (ref 95–110)
CK SERPL-CCNC: 34 U/L (ref 20–180)
CO2 SERPL-SCNC: 33 MMOL/L (ref 23–29)
CREAT SERPL-MCNC: 0.8 MG/DL (ref 0.5–1.4)
CRP SERPL-MCNC: 4.8 MG/L (ref 0–8.2)
DIFFERENTIAL METHOD: ABNORMAL
EOSINOPHIL # BLD AUTO: 0.1 K/UL (ref 0–0.5)
EOSINOPHIL NFR BLD: 1.6 % (ref 0–8)
ERYTHROCYTE [DISTWIDTH] IN BLOOD BY AUTOMATED COUNT: 12.4 % (ref 11.5–14.5)
ERYTHROCYTE [SEDIMENTATION RATE] IN BLOOD BY WESTERGREN METHOD: 54 MM/HR (ref 0–20)
EST. GFR  (AFRICAN AMERICAN): >60 ML/MIN/1.73 M^2
EST. GFR  (NON AFRICAN AMERICAN): >60 ML/MIN/1.73 M^2
GLUCOSE SERPL-MCNC: 85 MG/DL (ref 70–110)
HCT VFR BLD AUTO: 38.6 % (ref 37–48.5)
HGB BLD-MCNC: 12.5 G/DL (ref 12–16)
IMM GRANULOCYTES # BLD AUTO: 0.02 K/UL (ref 0–0.04)
IMM GRANULOCYTES NFR BLD AUTO: 0.4 % (ref 0–0.5)
LYMPHOCYTES # BLD AUTO: 1.2 K/UL (ref 1–4.8)
LYMPHOCYTES NFR BLD: 23 % (ref 18–48)
MCH RBC QN AUTO: 31.8 PG (ref 27–31)
MCHC RBC AUTO-ENTMCNC: 32.4 G/DL (ref 32–36)
MCV RBC AUTO: 98 FL (ref 82–98)
MONOCYTES # BLD AUTO: 0.5 K/UL (ref 0.3–1)
MONOCYTES NFR BLD: 9.3 % (ref 4–15)
NEUTROPHILS # BLD AUTO: 3.3 K/UL (ref 1.8–7.7)
NEUTROPHILS NFR BLD: 65.3 % (ref 38–73)
NRBC BLD-RTO: 0 /100 WBC
PLATELET # BLD AUTO: 220 K/UL (ref 150–350)
PMV BLD AUTO: 9.1 FL (ref 9.2–12.9)
POTASSIUM SERPL-SCNC: 4.5 MMOL/L (ref 3.5–5.1)
PROT SERPL-MCNC: 7.1 G/DL (ref 6–8.4)
RBC # BLD AUTO: 3.93 M/UL (ref 4–5.4)
SODIUM SERPL-SCNC: 141 MMOL/L (ref 136–145)
WBC # BLD AUTO: 5.04 K/UL (ref 3.9–12.7)

## 2021-01-22 PROCEDURE — 86039 ANTINUCLEAR ANTIBODIES (ANA): CPT

## 2021-01-22 PROCEDURE — 80053 COMPREHEN METABOLIC PANEL: CPT

## 2021-01-22 PROCEDURE — 36415 COLL VENOUS BLD VENIPUNCTURE: CPT

## 2021-01-22 PROCEDURE — 86160 COMPLEMENT ANTIGEN: CPT | Mod: 59

## 2021-01-22 PROCEDURE — 82550 ASSAY OF CK (CPK): CPT

## 2021-01-22 PROCEDURE — 86160 COMPLEMENT ANTIGEN: CPT

## 2021-01-22 PROCEDURE — 85025 COMPLETE CBC W/AUTO DIFF WBC: CPT

## 2021-01-22 PROCEDURE — 86235 NUCLEAR ANTIGEN ANTIBODY: CPT | Mod: 59

## 2021-01-22 PROCEDURE — 86038 ANTINUCLEAR ANTIBODIES: CPT

## 2021-01-22 PROCEDURE — 86140 C-REACTIVE PROTEIN: CPT

## 2021-01-22 PROCEDURE — 85652 RBC SED RATE AUTOMATED: CPT

## 2021-01-23 LAB
ANA PATTERN 1: NORMAL
ANA SER QL IF: POSITIVE
ANA TITR SER IF: NORMAL {TITER}

## 2021-01-25 ENCOUNTER — OFFICE VISIT (OUTPATIENT)
Dept: RHEUMATOLOGY | Facility: CLINIC | Age: 71
End: 2021-01-25
Payer: MEDICARE

## 2021-01-25 DIAGNOSIS — M35.00 SJOGREN'S SYNDROME, WITH UNSPECIFIED ORGAN INVOLVEMENT: Primary | ICD-10-CM

## 2021-01-25 DIAGNOSIS — E66.9 CLASS 1 OBESITY WITH BODY MASS INDEX (BMI) OF 31.0 TO 31.9 IN ADULT, UNSPECIFIED OBESITY TYPE, UNSPECIFIED WHETHER SERIOUS COMORBIDITY PRESENT: ICD-10-CM

## 2021-01-25 DIAGNOSIS — I27.20 PULMONARY HYPERTENSION: ICD-10-CM

## 2021-01-25 DIAGNOSIS — R70.0 ELEVATED SED RATE: ICD-10-CM

## 2021-01-25 DIAGNOSIS — J84.9 ILD (INTERSTITIAL LUNG DISEASE): ICD-10-CM

## 2021-01-25 DIAGNOSIS — R53.83 FATIGUE, UNSPECIFIED TYPE: ICD-10-CM

## 2021-01-25 LAB
ANTI SM ANTIBODY: 0.04 RATIO (ref 0–0.99)
ANTI SM/RNP ANTIBODY: 0.06 RATIO (ref 0–0.99)
ANTI-SM INTERPRETATION: NEGATIVE
ANTI-SM/RNP INTERPRETATION: NEGATIVE
ANTI-SSA ANTIBODY: 0.05 RATIO (ref 0–0.99)
ANTI-SSA INTERPRETATION: NEGATIVE
ANTI-SSB ANTIBODY: 0.05 RATIO (ref 0–0.99)
ANTI-SSB INTERPRETATION: NEGATIVE
DSDNA AB SER-ACNC: NORMAL [IU]/ML

## 2021-01-25 PROCEDURE — 1159F MED LIST DOCD IN RCRD: CPT | Mod: 95,,, | Performed by: INTERNAL MEDICINE

## 2021-01-25 PROCEDURE — 99214 OFFICE O/P EST MOD 30 MIN: CPT | Mod: 95,,, | Performed by: INTERNAL MEDICINE

## 2021-01-25 PROCEDURE — 99214 PR OFFICE/OUTPT VISIT, EST, LEVL IV, 30-39 MIN: ICD-10-PCS | Mod: 95,,, | Performed by: INTERNAL MEDICINE

## 2021-01-25 PROCEDURE — 1159F PR MEDICATION LIST DOCUMENTED IN MEDICAL RECORD: ICD-10-PCS | Mod: 95,,, | Performed by: INTERNAL MEDICINE

## 2021-01-25 PROCEDURE — 1125F AMNT PAIN NOTED PAIN PRSNT: CPT | Mod: 95,,, | Performed by: INTERNAL MEDICINE

## 2021-01-25 PROCEDURE — 1125F PR PAIN SEVERITY QUANTIFIED, PAIN PRESENT: ICD-10-PCS | Mod: 95,,, | Performed by: INTERNAL MEDICINE

## 2021-01-25 RX ORDER — FUROSEMIDE 20 MG/1
TABLET ORAL
COMMUNITY
End: 2021-02-10 | Stop reason: SDUPTHER

## 2021-01-25 RX ORDER — MYCOPHENOLATE MOFETIL 500 MG/1
TABLET ORAL
COMMUNITY
End: 2021-02-10 | Stop reason: SDUPTHER

## 2021-01-25 RX ORDER — ACETAMINOPHEN 325 MG/1
CAPSULE, LIQUID FILLED ORAL
COMMUNITY
Start: 2020-10-19

## 2021-01-25 ASSESSMENT — ROUTINE ASSESSMENT OF PATIENT INDEX DATA (RAPID3)
PSYCHOLOGICAL DISTRESS SCORE: 3.3
WHEN YOU AWAKENED IN THE MORNING OVER THE LAST WEEK, PLEASE INDICATE THE AMOUNT OF TIME IT TAKES UNTIL YOU ARE AS LIMBER AS YOU WILL BE FOR THE DAY: 1
PAIN SCORE: 2
FATIGUE SCORE: 2
TOTAL RAPID3 SCORE: 1.17
MDHAQ FUNCTION SCORE: 0.3
AM STIFFNESS SCORE: 1, YES
PATIENT GLOBAL ASSESSMENT SCORE: 0.5

## 2021-01-27 ENCOUNTER — PATIENT MESSAGE (OUTPATIENT)
Dept: RHEUMATOLOGY | Facility: CLINIC | Age: 71
End: 2021-01-27

## 2021-02-06 ENCOUNTER — IMMUNIZATION (OUTPATIENT)
Dept: FAMILY MEDICINE | Facility: CLINIC | Age: 71
End: 2021-02-06
Payer: MEDICARE

## 2021-02-06 DIAGNOSIS — Z23 NEED FOR VACCINATION: Primary | ICD-10-CM

## 2021-02-06 PROCEDURE — 91300 COVID-19, MRNA, LNP-S, PF, 30 MCG/0.3 ML DOSE VACCINE: CPT | Mod: PBBFAC | Performed by: FAMILY MEDICINE

## 2021-02-06 PROCEDURE — 0002A COVID-19, MRNA, LNP-S, PF, 30 MCG/0.3 ML DOSE VACCINE: CPT | Mod: PBBFAC | Performed by: FAMILY MEDICINE

## 2021-02-10 PROBLEM — J96.10 CHRONIC RESPIRATORY FAILURE: Status: ACTIVE | Noted: 2021-02-10

## 2021-02-10 PROBLEM — M35.1 MCTD (MIXED CONNECTIVE TISSUE DISEASE): Status: ACTIVE | Noted: 2021-02-10

## 2021-02-23 ENCOUNTER — PATIENT MESSAGE (OUTPATIENT)
Dept: RHEUMATOLOGY | Facility: CLINIC | Age: 71
End: 2021-02-23

## 2021-04-26 ENCOUNTER — LAB VISIT (OUTPATIENT)
Dept: LAB | Facility: HOSPITAL | Age: 71
End: 2021-04-26
Attending: INTERNAL MEDICINE
Payer: MEDICARE

## 2021-04-26 DIAGNOSIS — R70.0 ELEVATED SED RATE: ICD-10-CM

## 2021-04-26 DIAGNOSIS — R53.83 FATIGUE, UNSPECIFIED TYPE: ICD-10-CM

## 2021-04-26 DIAGNOSIS — J84.9 ILD (INTERSTITIAL LUNG DISEASE): ICD-10-CM

## 2021-04-26 DIAGNOSIS — I27.20 PULMONARY HYPERTENSION: ICD-10-CM

## 2021-04-26 DIAGNOSIS — E66.9 CLASS 1 OBESITY WITH BODY MASS INDEX (BMI) OF 31.0 TO 31.9 IN ADULT, UNSPECIFIED OBESITY TYPE, UNSPECIFIED WHETHER SERIOUS COMORBIDITY PRESENT: ICD-10-CM

## 2021-04-26 DIAGNOSIS — M35.00 SJOGREN'S SYNDROME, WITH UNSPECIFIED ORGAN INVOLVEMENT: ICD-10-CM

## 2021-04-26 LAB
ALBUMIN SERPL BCP-MCNC: 3.7 G/DL (ref 3.5–5.2)
ALP SERPL-CCNC: 85 U/L (ref 55–135)
ALT SERPL W/O P-5'-P-CCNC: 11 U/L (ref 10–44)
ANION GAP SERPL CALC-SCNC: 8 MMOL/L (ref 8–16)
AST SERPL-CCNC: 15 U/L (ref 10–40)
BASOPHILS # BLD AUTO: 0.01 K/UL (ref 0–0.2)
BASOPHILS NFR BLD: 0.2 % (ref 0–1.9)
BILIRUB SERPL-MCNC: 0.3 MG/DL (ref 0.1–1)
BUN SERPL-MCNC: 11 MG/DL (ref 8–23)
C3 SERPL-MCNC: 125 MG/DL (ref 50–180)
C4 SERPL-MCNC: 30 MG/DL (ref 11–44)
CALCIUM SERPL-MCNC: 8.9 MG/DL (ref 8.7–10.5)
CHLORIDE SERPL-SCNC: 102 MMOL/L (ref 95–110)
CK SERPL-CCNC: 41 U/L (ref 20–180)
CO2 SERPL-SCNC: 32 MMOL/L (ref 23–29)
CREAT SERPL-MCNC: 0.8 MG/DL (ref 0.5–1.4)
CRP SERPL-MCNC: 6.2 MG/L (ref 0–8.2)
DIFFERENTIAL METHOD: ABNORMAL
EOSINOPHIL # BLD AUTO: 0.1 K/UL (ref 0–0.5)
EOSINOPHIL NFR BLD: 1.5 % (ref 0–8)
ERYTHROCYTE [DISTWIDTH] IN BLOOD BY AUTOMATED COUNT: 12.2 % (ref 11.5–14.5)
ERYTHROCYTE [SEDIMENTATION RATE] IN BLOOD BY WESTERGREN METHOD: 70 MM/HR (ref 0–20)
EST. GFR  (AFRICAN AMERICAN): >60 ML/MIN/1.73 M^2
EST. GFR  (NON AFRICAN AMERICAN): >60 ML/MIN/1.73 M^2
GLUCOSE SERPL-MCNC: 98 MG/DL (ref 70–110)
HCT VFR BLD AUTO: 40.3 % (ref 37–48.5)
HGB BLD-MCNC: 12.8 G/DL (ref 12–16)
IMM GRANULOCYTES # BLD AUTO: 0.03 K/UL (ref 0–0.04)
IMM GRANULOCYTES NFR BLD AUTO: 0.6 % (ref 0–0.5)
LYMPHOCYTES # BLD AUTO: 1.1 K/UL (ref 1–4.8)
LYMPHOCYTES NFR BLD: 20.6 % (ref 18–48)
MCH RBC QN AUTO: 31.4 PG (ref 27–31)
MCHC RBC AUTO-ENTMCNC: 31.8 G/DL (ref 32–36)
MCV RBC AUTO: 99 FL (ref 82–98)
MONOCYTES # BLD AUTO: 0.4 K/UL (ref 0.3–1)
MONOCYTES NFR BLD: 8.3 % (ref 4–15)
NEUTROPHILS # BLD AUTO: 3.6 K/UL (ref 1.8–7.7)
NEUTROPHILS NFR BLD: 68.8 % (ref 38–73)
NRBC BLD-RTO: 0 /100 WBC
PLATELET # BLD AUTO: 225 K/UL (ref 150–450)
PMV BLD AUTO: 9.1 FL (ref 9.2–12.9)
POTASSIUM SERPL-SCNC: 4.1 MMOL/L (ref 3.5–5.1)
PROT SERPL-MCNC: 7.1 G/DL (ref 6–8.4)
RBC # BLD AUTO: 4.07 M/UL (ref 4–5.4)
SODIUM SERPL-SCNC: 142 MMOL/L (ref 136–145)
WBC # BLD AUTO: 5.29 K/UL (ref 3.9–12.7)

## 2021-04-26 PROCEDURE — 85025 COMPLETE CBC W/AUTO DIFF WBC: CPT | Performed by: INTERNAL MEDICINE

## 2021-04-26 PROCEDURE — 80053 COMPREHEN METABOLIC PANEL: CPT | Performed by: INTERNAL MEDICINE

## 2021-04-26 PROCEDURE — 86160 COMPLEMENT ANTIGEN: CPT | Mod: 59 | Performed by: INTERNAL MEDICINE

## 2021-04-26 PROCEDURE — 86225 DNA ANTIBODY NATIVE: CPT | Performed by: INTERNAL MEDICINE

## 2021-04-26 PROCEDURE — 36415 COLL VENOUS BLD VENIPUNCTURE: CPT | Performed by: INTERNAL MEDICINE

## 2021-04-26 PROCEDURE — 86140 C-REACTIVE PROTEIN: CPT | Performed by: INTERNAL MEDICINE

## 2021-04-26 PROCEDURE — 86160 COMPLEMENT ANTIGEN: CPT | Performed by: INTERNAL MEDICINE

## 2021-04-26 PROCEDURE — 82550 ASSAY OF CK (CPK): CPT | Performed by: INTERNAL MEDICINE

## 2021-04-26 PROCEDURE — 85652 RBC SED RATE AUTOMATED: CPT | Performed by: INTERNAL MEDICINE

## 2021-04-27 ENCOUNTER — PATIENT MESSAGE (OUTPATIENT)
Dept: RHEUMATOLOGY | Facility: CLINIC | Age: 71
End: 2021-04-27

## 2021-04-27 LAB — DSDNA AB SER-ACNC: NORMAL [IU]/ML

## 2021-05-03 ENCOUNTER — OFFICE VISIT (OUTPATIENT)
Dept: RHEUMATOLOGY | Facility: CLINIC | Age: 71
End: 2021-05-03
Payer: MEDICARE

## 2021-05-03 ENCOUNTER — PATIENT MESSAGE (OUTPATIENT)
Dept: RHEUMATOLOGY | Facility: CLINIC | Age: 71
End: 2021-05-03

## 2021-05-03 DIAGNOSIS — R53.83 FATIGUE, UNSPECIFIED TYPE: ICD-10-CM

## 2021-05-03 DIAGNOSIS — M35.00 SJOGREN'S SYNDROME, WITH UNSPECIFIED ORGAN INVOLVEMENT: Primary | ICD-10-CM

## 2021-05-03 DIAGNOSIS — E66.9 CLASS 1 OBESITY WITH BODY MASS INDEX (BMI) OF 31.0 TO 31.9 IN ADULT, UNSPECIFIED OBESITY TYPE, UNSPECIFIED WHETHER SERIOUS COMORBIDITY PRESENT: ICD-10-CM

## 2021-05-03 DIAGNOSIS — J84.9 ILD (INTERSTITIAL LUNG DISEASE): ICD-10-CM

## 2021-05-03 DIAGNOSIS — R70.0 ELEVATED SED RATE: ICD-10-CM

## 2021-05-03 DIAGNOSIS — I27.20 PULMONARY HYPERTENSION: ICD-10-CM

## 2021-05-03 PROCEDURE — 99214 PR OFFICE/OUTPT VISIT, EST, LEVL IV, 30-39 MIN: ICD-10-PCS | Mod: 95,,, | Performed by: INTERNAL MEDICINE

## 2021-05-03 PROCEDURE — 1125F AMNT PAIN NOTED PAIN PRSNT: CPT | Mod: 95,,, | Performed by: INTERNAL MEDICINE

## 2021-05-03 PROCEDURE — 1159F PR MEDICATION LIST DOCUMENTED IN MEDICAL RECORD: ICD-10-PCS | Mod: 95,,, | Performed by: INTERNAL MEDICINE

## 2021-05-03 PROCEDURE — 99214 OFFICE O/P EST MOD 30 MIN: CPT | Mod: 95,,, | Performed by: INTERNAL MEDICINE

## 2021-05-03 PROCEDURE — 1125F PR PAIN SEVERITY QUANTIFIED, PAIN PRESENT: ICD-10-PCS | Mod: 95,,, | Performed by: INTERNAL MEDICINE

## 2021-05-03 PROCEDURE — 1159F MED LIST DOCD IN RCRD: CPT | Mod: 95,,, | Performed by: INTERNAL MEDICINE

## 2021-05-27 ENCOUNTER — NURSE TRIAGE (OUTPATIENT)
Dept: ADMINISTRATIVE | Facility: CLINIC | Age: 71
End: 2021-05-27

## 2021-06-14 ENCOUNTER — HOSPITAL ENCOUNTER (OUTPATIENT)
Facility: HOSPITAL | Age: 71
Discharge: HOME | End: 2021-06-15
Attending: EMERGENCY MEDICINE
Payer: MEDICARE

## 2021-06-14 DIAGNOSIS — K22.0 ACHALASIA: ICD-10-CM

## 2021-06-14 DIAGNOSIS — R13.10 DYSPHAGIA, UNSPECIFIED TYPE: Primary | ICD-10-CM

## 2021-06-14 DIAGNOSIS — R11.10 VOMITING: ICD-10-CM

## 2021-06-14 PROCEDURE — 96360 HYDRATION IV INFUSION INIT: CPT

## 2021-06-14 PROCEDURE — 63600175 PHARM REV CODE 636 W HCPCS: Mod: JG | Performed by: EMERGENCY MEDICINE

## 2021-06-14 PROCEDURE — 25000003 PHARM REV CODE 250: Performed by: EMERGENCY MEDICINE

## 2021-06-14 PROCEDURE — 96372 THER/PROPH/DIAG INJ SC/IM: CPT | Mod: 59

## 2021-06-14 PROCEDURE — 99285 EMERGENCY DEPT VISIT HI MDM: CPT | Mod: 25

## 2021-06-14 RX ORDER — NITROGLYCERIN 0.4 MG/1
0.4 TABLET SUBLINGUAL
Status: DISCONTINUED | OUTPATIENT
Start: 2021-06-14 | End: 2021-06-14

## 2021-06-14 RX ORDER — GLUCAGON 1 MG
1 KIT INJECTION
Status: COMPLETED | OUTPATIENT
Start: 2021-06-14 | End: 2021-06-14

## 2021-06-14 RX ADMIN — SODIUM CHLORIDE 1000 ML: 0.9 INJECTION, SOLUTION INTRAVENOUS at 10:06

## 2021-06-14 RX ADMIN — GLUCAGON HYDROCHLORIDE 1 MG: 1 INJECTION, POWDER, FOR SOLUTION INTRAMUSCULAR; INTRAVENOUS; SUBCUTANEOUS at 10:06

## 2021-06-15 ENCOUNTER — PATIENT MESSAGE (OUTPATIENT)
Dept: GASTROENTEROLOGY | Facility: CLINIC | Age: 71
End: 2021-06-15

## 2021-06-15 ENCOUNTER — ANESTHESIA EVENT (OUTPATIENT)
Dept: ENDOSCOPY | Facility: HOSPITAL | Age: 71
End: 2021-06-15
Payer: MEDICARE

## 2021-06-15 ENCOUNTER — ANESTHESIA (OUTPATIENT)
Dept: ENDOSCOPY | Facility: HOSPITAL | Age: 71
End: 2021-06-15
Payer: MEDICARE

## 2021-06-15 ENCOUNTER — TELEPHONE (OUTPATIENT)
Dept: GASTROENTEROLOGY | Facility: CLINIC | Age: 71
End: 2021-06-15

## 2021-06-15 VITALS
TEMPERATURE: 98 F | BODY MASS INDEX: 30.66 KG/M2 | RESPIRATION RATE: 19 BRPM | WEIGHT: 207 LBS | OXYGEN SATURATION: 95 % | DIASTOLIC BLOOD PRESSURE: 45 MMHG | SYSTOLIC BLOOD PRESSURE: 104 MMHG | HEIGHT: 69 IN | HEART RATE: 74 BPM

## 2021-06-15 DIAGNOSIS — K20.90 ESOPHAGITIS: Primary | ICD-10-CM

## 2021-06-15 PROBLEM — R11.10 VOMITING: Status: ACTIVE | Noted: 2021-06-15

## 2021-06-15 LAB
ANION GAP SERPL CALC-SCNC: 14 MMOL/L (ref 8–16)
BASOPHILS # BLD AUTO: 0.02 K/UL (ref 0–0.2)
BASOPHILS NFR BLD: 0.3 % (ref 0–1.9)
BUN SERPL-MCNC: 17 MG/DL (ref 8–23)
CALCIUM SERPL-MCNC: 9.6 MG/DL (ref 8.7–10.5)
CHLORIDE SERPL-SCNC: 103 MMOL/L (ref 95–110)
CO2 SERPL-SCNC: 28 MMOL/L (ref 23–29)
CREAT SERPL-MCNC: 0.9 MG/DL (ref 0.5–1.4)
DIFFERENTIAL METHOD: ABNORMAL
EOSINOPHIL # BLD AUTO: 0.1 K/UL (ref 0–0.5)
EOSINOPHIL NFR BLD: 1 % (ref 0–8)
ERYTHROCYTE [DISTWIDTH] IN BLOOD BY AUTOMATED COUNT: 12.5 % (ref 11.5–14.5)
EST. GFR  (AFRICAN AMERICAN): >60 ML/MIN/1.73 M^2
EST. GFR  (NON AFRICAN AMERICAN): >60 ML/MIN/1.73 M^2
GLUCOSE SERPL-MCNC: 84 MG/DL (ref 70–110)
HCT VFR BLD AUTO: 42.2 % (ref 37–48.5)
HGB BLD-MCNC: 13.5 G/DL (ref 12–16)
IMM GRANULOCYTES # BLD AUTO: 0.05 K/UL (ref 0–0.04)
IMM GRANULOCYTES NFR BLD AUTO: 0.6 % (ref 0–0.5)
LYMPHOCYTES # BLD AUTO: 1.2 K/UL (ref 1–4.8)
LYMPHOCYTES NFR BLD: 14.9 % (ref 18–48)
MCH RBC QN AUTO: 31.7 PG (ref 27–31)
MCHC RBC AUTO-ENTMCNC: 32 G/DL (ref 32–36)
MCV RBC AUTO: 99 FL (ref 82–98)
MONOCYTES # BLD AUTO: 0.6 K/UL (ref 0.3–1)
MONOCYTES NFR BLD: 7.4 % (ref 4–15)
NEUTROPHILS # BLD AUTO: 6 K/UL (ref 1.8–7.7)
NEUTROPHILS NFR BLD: 75.8 % (ref 38–73)
NRBC BLD-RTO: 0 /100 WBC
PLATELET # BLD AUTO: 250 K/UL (ref 150–450)
PMV BLD AUTO: 9.5 FL (ref 9.2–12.9)
POTASSIUM SERPL-SCNC: 3.5 MMOL/L (ref 3.5–5.1)
RBC # BLD AUTO: 4.26 M/UL (ref 4–5.4)
SODIUM SERPL-SCNC: 145 MMOL/L (ref 136–145)
WBC # BLD AUTO: 7.87 K/UL (ref 3.9–12.7)

## 2021-06-15 PROCEDURE — 85025 COMPLETE CBC W/AUTO DIFF WBC: CPT | Performed by: EMERGENCY MEDICINE

## 2021-06-15 PROCEDURE — 88305 TISSUE EXAM BY PATHOLOGIST: CPT | Mod: 26,,, | Performed by: PATHOLOGY

## 2021-06-15 PROCEDURE — 43247 EGD REMOVE FOREIGN BODY: CPT | Performed by: INTERNAL MEDICINE

## 2021-06-15 PROCEDURE — 88342 CHG IMMUNOCYTOCHEMISTRY: ICD-10-PCS | Mod: 26,,, | Performed by: PATHOLOGY

## 2021-06-15 PROCEDURE — 25000003 PHARM REV CODE 250: Performed by: ORTHOPAEDIC SURGERY

## 2021-06-15 PROCEDURE — 88342 IMHCHEM/IMCYTCHM 1ST ANTB: CPT | Performed by: PATHOLOGY

## 2021-06-15 PROCEDURE — 80048 BASIC METABOLIC PNL TOTAL CA: CPT | Performed by: EMERGENCY MEDICINE

## 2021-06-15 PROCEDURE — 37000008 HC ANESTHESIA 1ST 15 MINUTES: Performed by: INTERNAL MEDICINE

## 2021-06-15 PROCEDURE — 37000009 HC ANESTHESIA EA ADD 15 MINS: Performed by: INTERNAL MEDICINE

## 2021-06-15 PROCEDURE — 88342 IMHCHEM/IMCYTCHM 1ST ANTB: CPT | Mod: 26,,, | Performed by: PATHOLOGY

## 2021-06-15 PROCEDURE — 88305 TISSUE EXAM BY PATHOLOGIST: ICD-10-PCS | Mod: 26,,, | Performed by: PATHOLOGY

## 2021-06-15 PROCEDURE — 63600175 PHARM REV CODE 636 W HCPCS: Performed by: ORTHOPAEDIC SURGERY

## 2021-06-15 PROCEDURE — 27201012 HC FORCEPS, HOT/COLD, DISP: Performed by: INTERNAL MEDICINE

## 2021-06-15 PROCEDURE — 43239 EGD BIOPSY SINGLE/MULTIPLE: CPT | Performed by: INTERNAL MEDICINE

## 2021-06-15 PROCEDURE — 88305 TISSUE EXAM BY PATHOLOGIST: CPT | Performed by: PATHOLOGY

## 2021-06-15 RX ORDER — OXYCODONE HYDROCHLORIDE 5 MG/1
5 TABLET ORAL
Status: DISCONTINUED | OUTPATIENT
Start: 2021-06-15 | End: 2021-06-15 | Stop reason: HOSPADM

## 2021-06-15 RX ORDER — ONDANSETRON 2 MG/ML
INJECTION INTRAMUSCULAR; INTRAVENOUS
Status: DISCONTINUED | OUTPATIENT
Start: 2021-06-15 | End: 2021-06-15

## 2021-06-15 RX ORDER — HYDROMORPHONE HYDROCHLORIDE 2 MG/ML
0.5 INJECTION, SOLUTION INTRAMUSCULAR; INTRAVENOUS; SUBCUTANEOUS EVERY 30 MIN PRN
Status: DISCONTINUED | OUTPATIENT
Start: 2021-06-15 | End: 2021-06-15 | Stop reason: HOSPADM

## 2021-06-15 RX ORDER — FENTANYL CITRATE 50 UG/ML
INJECTION, SOLUTION INTRAMUSCULAR; INTRAVENOUS
Status: DISCONTINUED | OUTPATIENT
Start: 2021-06-15 | End: 2021-06-15

## 2021-06-15 RX ORDER — HYDROMORPHONE HYDROCHLORIDE 2 MG/ML
0.2 INJECTION, SOLUTION INTRAMUSCULAR; INTRAVENOUS; SUBCUTANEOUS EVERY 5 MIN PRN
Status: DISCONTINUED | OUTPATIENT
Start: 2021-06-15 | End: 2021-06-15 | Stop reason: HOSPADM

## 2021-06-15 RX ORDER — LIDOCAINE HYDROCHLORIDE 20 MG/ML
INJECTION, SOLUTION EPIDURAL; INFILTRATION; INTRACAUDAL; PERINEURAL
Status: DISCONTINUED | OUTPATIENT
Start: 2021-06-15 | End: 2021-06-15

## 2021-06-15 RX ORDER — PHENYLEPHRINE HYDROCHLORIDE 10 MG/ML
INJECTION INTRAVENOUS
Status: DISCONTINUED | OUTPATIENT
Start: 2021-06-15 | End: 2021-06-15

## 2021-06-15 RX ORDER — ONDANSETRON 2 MG/ML
4 INJECTION INTRAMUSCULAR; INTRAVENOUS DAILY PRN
Status: DISCONTINUED | OUTPATIENT
Start: 2021-06-15 | End: 2021-06-15 | Stop reason: HOSPADM

## 2021-06-15 RX ORDER — SUCCINYLCHOLINE CHLORIDE 20 MG/ML
INJECTION INTRAMUSCULAR; INTRAVENOUS
Status: DISCONTINUED | OUTPATIENT
Start: 2021-06-15 | End: 2021-06-15

## 2021-06-15 RX ORDER — PROPOFOL 10 MG/ML
VIAL (ML) INTRAVENOUS
Status: DISCONTINUED | OUTPATIENT
Start: 2021-06-15 | End: 2021-06-15

## 2021-06-15 RX ORDER — PANTOPRAZOLE SODIUM 40 MG/1
40 TABLET, DELAYED RELEASE ORAL 2 TIMES DAILY
Qty: 60 TABLET | Refills: 1 | Status: SHIPPED | OUTPATIENT
Start: 2021-06-15 | End: 2021-07-08

## 2021-06-15 RX ORDER — ETOMIDATE 2 MG/ML
INJECTION INTRAVENOUS
Status: DISCONTINUED | OUTPATIENT
Start: 2021-06-15 | End: 2021-06-15

## 2021-06-15 RX ORDER — EPHEDRINE SULFATE 50 MG/ML
INJECTION, SOLUTION INTRAVENOUS
Status: DISCONTINUED | OUTPATIENT
Start: 2021-06-15 | End: 2021-06-15

## 2021-06-15 RX ADMIN — SUCCINYLCHOLINE CHLORIDE 100 MG: 20 INJECTION, SOLUTION INTRAMUSCULAR; INTRAVENOUS at 02:06

## 2021-06-15 RX ADMIN — EPHEDRINE SULFATE 10 MG: 50 INJECTION INTRAVENOUS at 02:06

## 2021-06-15 RX ADMIN — PROPOFOL 30 MG: 10 INJECTION, EMULSION INTRAVENOUS at 02:06

## 2021-06-15 RX ADMIN — PROPOFOL 20 MG: 10 INJECTION, EMULSION INTRAVENOUS at 02:06

## 2021-06-15 RX ADMIN — PHENYLEPHRINE HYDROCHLORIDE 100 MCG: 10 INJECTION INTRAVENOUS at 02:06

## 2021-06-15 RX ADMIN — ONDANSETRON 4 MG: 2 INJECTION, SOLUTION INTRAMUSCULAR; INTRAVENOUS at 02:06

## 2021-06-15 RX ADMIN — LIDOCAINE HYDROCHLORIDE 100 MG: 20 INJECTION, SOLUTION EPIDURAL; INFILTRATION; INTRACAUDAL; PERINEURAL at 02:06

## 2021-06-15 RX ADMIN — PHENYLEPHRINE HYDROCHLORIDE 200 MCG: 10 INJECTION INTRAVENOUS at 02:06

## 2021-06-15 RX ADMIN — ETOMIDATE 10 MG: 2 INJECTION, SOLUTION INTRAVENOUS at 02:06

## 2021-06-15 RX ADMIN — FENTANYL CITRATE 50 MCG: 50 INJECTION, SOLUTION INTRAMUSCULAR; INTRAVENOUS at 02:06

## 2021-06-15 RX ADMIN — PROPOFOL 50 MG: 10 INJECTION, EMULSION INTRAVENOUS at 02:06

## 2021-06-18 LAB
FINAL PATHOLOGIC DIAGNOSIS: NORMAL
GROSS: NORMAL
Lab: NORMAL

## 2021-06-21 ENCOUNTER — TELEPHONE (OUTPATIENT)
Dept: NEUROLOGY | Facility: HOSPITAL | Age: 71
End: 2021-06-21

## 2021-06-22 ENCOUNTER — TELEPHONE (OUTPATIENT)
Dept: ENDOSCOPY | Facility: HOSPITAL | Age: 71
End: 2021-06-22

## 2021-06-22 ENCOUNTER — OFFICE VISIT (OUTPATIENT)
Dept: GASTROENTEROLOGY | Facility: CLINIC | Age: 71
End: 2021-06-22
Payer: MEDICARE

## 2021-06-22 VITALS
HEART RATE: 63 BPM | WEIGHT: 208.75 LBS | BODY MASS INDEX: 30.92 KG/M2 | SYSTOLIC BLOOD PRESSURE: 117 MMHG | HEIGHT: 69 IN | DIASTOLIC BLOOD PRESSURE: 67 MMHG

## 2021-06-22 DIAGNOSIS — R13.10 DYSPHAGIA, UNSPECIFIED TYPE: Primary | ICD-10-CM

## 2021-06-22 DIAGNOSIS — Z12.11 COLON CANCER SCREENING: ICD-10-CM

## 2021-06-22 PROCEDURE — 99214 OFFICE O/P EST MOD 30 MIN: CPT | Mod: S$GLB,,, | Performed by: INTERNAL MEDICINE

## 2021-06-22 PROCEDURE — 3288F PR FALLS RISK ASSESSMENT DOCUMENTED: ICD-10-PCS | Mod: CPTII,S$GLB,, | Performed by: INTERNAL MEDICINE

## 2021-06-22 PROCEDURE — 1126F PR PAIN SEVERITY QUANTIFIED, NO PAIN PRESENT: ICD-10-PCS | Mod: S$GLB,,, | Performed by: INTERNAL MEDICINE

## 2021-06-22 PROCEDURE — 1126F AMNT PAIN NOTED NONE PRSNT: CPT | Mod: S$GLB,,, | Performed by: INTERNAL MEDICINE

## 2021-06-22 PROCEDURE — 1159F PR MEDICATION LIST DOCUMENTED IN MEDICAL RECORD: ICD-10-PCS | Mod: S$GLB,,, | Performed by: INTERNAL MEDICINE

## 2021-06-22 PROCEDURE — 1101F PT FALLS ASSESS-DOCD LE1/YR: CPT | Mod: CPTII,S$GLB,, | Performed by: INTERNAL MEDICINE

## 2021-06-22 PROCEDURE — 1101F PR PT FALLS ASSESS DOC 0-1 FALLS W/OUT INJ PAST YR: ICD-10-PCS | Mod: CPTII,S$GLB,, | Performed by: INTERNAL MEDICINE

## 2021-06-22 PROCEDURE — 1159F MED LIST DOCD IN RCRD: CPT | Mod: S$GLB,,, | Performed by: INTERNAL MEDICINE

## 2021-06-22 PROCEDURE — 3288F FALL RISK ASSESSMENT DOCD: CPT | Mod: CPTII,S$GLB,, | Performed by: INTERNAL MEDICINE

## 2021-06-22 PROCEDURE — 99999 PR PBB SHADOW E&M-EST. PATIENT-LVL IV: CPT | Mod: PBBFAC,,, | Performed by: INTERNAL MEDICINE

## 2021-06-22 PROCEDURE — 3008F BODY MASS INDEX DOCD: CPT | Mod: CPTII,S$GLB,, | Performed by: INTERNAL MEDICINE

## 2021-06-22 PROCEDURE — 99214 PR OFFICE/OUTPT VISIT, EST, LEVL IV, 30-39 MIN: ICD-10-PCS | Mod: S$GLB,,, | Performed by: INTERNAL MEDICINE

## 2021-06-22 PROCEDURE — 3008F PR BODY MASS INDEX (BMI) DOCUMENTED: ICD-10-PCS | Mod: CPTII,S$GLB,, | Performed by: INTERNAL MEDICINE

## 2021-06-22 PROCEDURE — 99999 PR PBB SHADOW E&M-EST. PATIENT-LVL IV: ICD-10-PCS | Mod: PBBFAC,,, | Performed by: INTERNAL MEDICINE

## 2021-06-23 ENCOUNTER — OFFICE VISIT (OUTPATIENT)
Dept: NEUROLOGY | Facility: HOSPITAL | Age: 71
End: 2021-06-23
Attending: INTERNAL MEDICINE
Payer: MEDICARE

## 2021-06-23 DIAGNOSIS — K22.2 ESOPHAGEAL STRICTURE: Primary | ICD-10-CM

## 2021-06-28 ENCOUNTER — PATIENT MESSAGE (OUTPATIENT)
Dept: GASTROENTEROLOGY | Facility: CLINIC | Age: 71
End: 2021-06-28

## 2021-07-07 DIAGNOSIS — Z12.11 COLON CANCER SCREENING: Primary | ICD-10-CM

## 2021-07-07 RX ORDER — SODIUM, POTASSIUM,MAG SULFATES 17.5-3.13G
1 SOLUTION, RECONSTITUTED, ORAL ORAL DAILY
Qty: 1 KIT | Refills: 0 | Status: SHIPPED | OUTPATIENT
Start: 2021-07-07 | End: 2021-07-09

## 2021-07-08 ENCOUNTER — TELEPHONE (OUTPATIENT)
Dept: ENDOSCOPY | Facility: HOSPITAL | Age: 71
End: 2021-07-08

## 2021-07-08 ENCOUNTER — PATIENT MESSAGE (OUTPATIENT)
Dept: ENDOSCOPY | Facility: HOSPITAL | Age: 71
End: 2021-07-08

## 2021-07-14 ENCOUNTER — LAB VISIT (OUTPATIENT)
Dept: LAB | Facility: HOSPITAL | Age: 71
End: 2021-07-14
Payer: MEDICARE

## 2021-07-14 DIAGNOSIS — R70.0 ELEVATED SED RATE: ICD-10-CM

## 2021-07-14 DIAGNOSIS — I27.20 PULMONARY HYPERTENSION: ICD-10-CM

## 2021-07-14 DIAGNOSIS — E66.9 CLASS 1 OBESITY WITH BODY MASS INDEX (BMI) OF 31.0 TO 31.9 IN ADULT, UNSPECIFIED OBESITY TYPE, UNSPECIFIED WHETHER SERIOUS COMORBIDITY PRESENT: ICD-10-CM

## 2021-07-14 DIAGNOSIS — M35.00 SJOGREN'S SYNDROME, WITH UNSPECIFIED ORGAN INVOLVEMENT: ICD-10-CM

## 2021-07-14 DIAGNOSIS — J84.9 ILD (INTERSTITIAL LUNG DISEASE): ICD-10-CM

## 2021-07-14 DIAGNOSIS — R53.83 FATIGUE, UNSPECIFIED TYPE: ICD-10-CM

## 2021-07-14 LAB
ALBUMIN SERPL BCP-MCNC: 3.7 G/DL (ref 3.5–5.2)
ALP SERPL-CCNC: 91 U/L (ref 55–135)
ALT SERPL W/O P-5'-P-CCNC: 10 U/L (ref 10–44)
ANION GAP SERPL CALC-SCNC: 11 MMOL/L (ref 8–16)
AST SERPL-CCNC: 12 U/L (ref 10–40)
BASOPHILS # BLD AUTO: 0.02 K/UL (ref 0–0.2)
BASOPHILS NFR BLD: 0.4 % (ref 0–1.9)
BILIRUB SERPL-MCNC: 0.4 MG/DL (ref 0.1–1)
BUN SERPL-MCNC: 16 MG/DL (ref 8–23)
CALCIUM SERPL-MCNC: 9.2 MG/DL (ref 8.7–10.5)
CHLORIDE SERPL-SCNC: 103 MMOL/L (ref 95–110)
CK SERPL-CCNC: 33 U/L (ref 20–180)
CO2 SERPL-SCNC: 26 MMOL/L (ref 23–29)
CREAT SERPL-MCNC: 0.9 MG/DL (ref 0.5–1.4)
CRP SERPL-MCNC: 6.6 MG/L (ref 0–8.2)
DIFFERENTIAL METHOD: ABNORMAL
EOSINOPHIL # BLD AUTO: 0.1 K/UL (ref 0–0.5)
EOSINOPHIL NFR BLD: 1.9 % (ref 0–8)
ERYTHROCYTE [DISTWIDTH] IN BLOOD BY AUTOMATED COUNT: 12.8 % (ref 11.5–14.5)
ERYTHROCYTE [SEDIMENTATION RATE] IN BLOOD BY WESTERGREN METHOD: 67 MM/HR (ref 0–20)
EST. GFR  (AFRICAN AMERICAN): >60 ML/MIN/1.73 M^2
EST. GFR  (NON AFRICAN AMERICAN): >60 ML/MIN/1.73 M^2
GLUCOSE SERPL-MCNC: 97 MG/DL (ref 70–110)
HCT VFR BLD AUTO: 39.2 % (ref 37–48.5)
HGB BLD-MCNC: 12.3 G/DL (ref 12–16)
IMM GRANULOCYTES # BLD AUTO: 0.04 K/UL (ref 0–0.04)
IMM GRANULOCYTES NFR BLD AUTO: 0.8 % (ref 0–0.5)
LYMPHOCYTES # BLD AUTO: 1.4 K/UL (ref 1–4.8)
LYMPHOCYTES NFR BLD: 26.7 % (ref 18–48)
MCH RBC QN AUTO: 30.9 PG (ref 27–31)
MCHC RBC AUTO-ENTMCNC: 31.4 G/DL (ref 32–36)
MCV RBC AUTO: 99 FL (ref 82–98)
MONOCYTES # BLD AUTO: 0.5 K/UL (ref 0.3–1)
MONOCYTES NFR BLD: 9.2 % (ref 4–15)
NEUTROPHILS # BLD AUTO: 3.3 K/UL (ref 1.8–7.7)
NEUTROPHILS NFR BLD: 61 % (ref 38–73)
NRBC BLD-RTO: 0 /100 WBC
PLATELET # BLD AUTO: 211 K/UL (ref 150–450)
PMV BLD AUTO: 9.3 FL (ref 9.2–12.9)
POTASSIUM SERPL-SCNC: 4.2 MMOL/L (ref 3.5–5.1)
PROT SERPL-MCNC: 7.3 G/DL (ref 6–8.4)
RBC # BLD AUTO: 3.98 M/UL (ref 4–5.4)
SODIUM SERPL-SCNC: 140 MMOL/L (ref 136–145)
WBC # BLD AUTO: 5.32 K/UL (ref 3.9–12.7)

## 2021-07-14 PROCEDURE — 84165 PROTEIN E-PHORESIS SERUM: CPT | Performed by: INTERNAL MEDICINE

## 2021-07-14 PROCEDURE — 86140 C-REACTIVE PROTEIN: CPT | Performed by: INTERNAL MEDICINE

## 2021-07-14 PROCEDURE — 86160 COMPLEMENT ANTIGEN: CPT | Performed by: INTERNAL MEDICINE

## 2021-07-14 PROCEDURE — 86334 IMMUNOFIX E-PHORESIS SERUM: CPT | Performed by: INTERNAL MEDICINE

## 2021-07-14 PROCEDURE — 84165 PATHOLOGIST INTERPRETATION SPE: ICD-10-PCS | Mod: 26,,, | Performed by: PATHOLOGY

## 2021-07-14 PROCEDURE — 80053 COMPREHEN METABOLIC PANEL: CPT | Performed by: INTERNAL MEDICINE

## 2021-07-14 PROCEDURE — 86160 COMPLEMENT ANTIGEN: CPT | Mod: 59 | Performed by: INTERNAL MEDICINE

## 2021-07-14 PROCEDURE — 86334 IMMUNOFIX E-PHORESIS SERUM: CPT | Mod: 26,,, | Performed by: PATHOLOGY

## 2021-07-14 PROCEDURE — 82550 ASSAY OF CK (CPK): CPT | Performed by: INTERNAL MEDICINE

## 2021-07-14 PROCEDURE — 85652 RBC SED RATE AUTOMATED: CPT | Performed by: INTERNAL MEDICINE

## 2021-07-14 PROCEDURE — 84165 PROTEIN E-PHORESIS SERUM: CPT | Mod: 26,,, | Performed by: PATHOLOGY

## 2021-07-14 PROCEDURE — 85025 COMPLETE CBC W/AUTO DIFF WBC: CPT | Performed by: INTERNAL MEDICINE

## 2021-07-14 PROCEDURE — 86334 PATHOLOGIST INTERPRETATION IFE: ICD-10-PCS | Mod: 26,,, | Performed by: PATHOLOGY

## 2021-07-14 PROCEDURE — 36415 COLL VENOUS BLD VENIPUNCTURE: CPT | Performed by: INTERNAL MEDICINE

## 2021-07-15 LAB
ALBUMIN SERPL ELPH-MCNC: 3.97 G/DL (ref 3.35–5.55)
ALPHA1 GLOB SERPL ELPH-MCNC: 0.32 G/DL (ref 0.17–0.41)
ALPHA2 GLOB SERPL ELPH-MCNC: 0.88 G/DL (ref 0.43–0.99)
B-GLOBULIN SERPL ELPH-MCNC: 0.65 G/DL (ref 0.5–1.1)
C3 SERPL-MCNC: 138 MG/DL (ref 50–180)
C4 SERPL-MCNC: 29 MG/DL (ref 11–44)
GAMMA GLOB SERPL ELPH-MCNC: 0.78 G/DL (ref 0.67–1.58)
INTERPRETATION SERPL IFE-IMP: NORMAL
PATHOLOGIST INTERPRETATION IFE: NORMAL
PATHOLOGIST INTERPRETATION SPE: NORMAL
PROT SERPL-MCNC: 6.6 G/DL (ref 6–8.4)

## 2021-07-16 ENCOUNTER — HOSPITAL ENCOUNTER (OUTPATIENT)
Dept: RADIOLOGY | Facility: HOSPITAL | Age: 71
Discharge: HOME | End: 2021-07-16
Attending: PHYSICIAN ASSISTANT
Payer: MEDICARE

## 2021-07-16 ENCOUNTER — OFFICE VISIT (OUTPATIENT)
Dept: ORTHOPEDICS | Facility: CLINIC | Age: 71
End: 2021-07-16
Payer: MEDICARE

## 2021-07-16 VITALS — HEIGHT: 69 IN | BODY MASS INDEX: 31.25 KG/M2 | WEIGHT: 211 LBS

## 2021-07-16 DIAGNOSIS — M75.42 IMPINGEMENT SYNDROME OF LEFT SHOULDER: Primary | ICD-10-CM

## 2021-07-16 DIAGNOSIS — G89.29 CHRONIC NECK PAIN: ICD-10-CM

## 2021-07-16 DIAGNOSIS — M54.2 CHRONIC NECK PAIN: ICD-10-CM

## 2021-07-16 DIAGNOSIS — M50.30 DDD (DEGENERATIVE DISC DISEASE), CERVICAL: ICD-10-CM

## 2021-07-16 DIAGNOSIS — M25.519 SHOULDER PAIN, UNSPECIFIED CHRONICITY, UNSPECIFIED LATERALITY: ICD-10-CM

## 2021-07-16 PROCEDURE — 99999 PR PBB SHADOW E&M-EST. PATIENT-LVL III: CPT | Mod: PBBFAC,,, | Performed by: PHYSICIAN ASSISTANT

## 2021-07-16 PROCEDURE — 73030 XR SHOULDER COMPLETE 2 OR MORE VIEWS LEFT: ICD-10-PCS | Mod: 26,LT,, | Performed by: RADIOLOGY

## 2021-07-16 PROCEDURE — 3288F PR FALLS RISK ASSESSMENT DOCUMENTED: ICD-10-PCS | Mod: CPTII,S$GLB,, | Performed by: PHYSICIAN ASSISTANT

## 2021-07-16 PROCEDURE — 99999 PR PBB SHADOW E&M-EST. PATIENT-LVL III: ICD-10-PCS | Mod: PBBFAC,,, | Performed by: PHYSICIAN ASSISTANT

## 2021-07-16 PROCEDURE — 1125F PR PAIN SEVERITY QUANTIFIED, PAIN PRESENT: ICD-10-PCS | Mod: S$GLB,,, | Performed by: PHYSICIAN ASSISTANT

## 2021-07-16 PROCEDURE — 99203 PR OFFICE/OUTPT VISIT, NEW, LEVL III, 30-44 MIN: ICD-10-PCS | Mod: 25,S$GLB,, | Performed by: PHYSICIAN ASSISTANT

## 2021-07-16 PROCEDURE — 20610 LARGE JOINT ASPIRATION/INJECTION: L SUBACROMIAL BURSA: ICD-10-PCS | Mod: LT,S$GLB,, | Performed by: PHYSICIAN ASSISTANT

## 2021-07-16 PROCEDURE — 1101F PT FALLS ASSESS-DOCD LE1/YR: CPT | Mod: CPTII,S$GLB,, | Performed by: PHYSICIAN ASSISTANT

## 2021-07-16 PROCEDURE — 3008F PR BODY MASS INDEX (BMI) DOCUMENTED: ICD-10-PCS | Mod: CPTII,S$GLB,, | Performed by: PHYSICIAN ASSISTANT

## 2021-07-16 PROCEDURE — 20610 DRAIN/INJ JOINT/BURSA W/O US: CPT | Mod: LT,S$GLB,, | Performed by: PHYSICIAN ASSISTANT

## 2021-07-16 PROCEDURE — 3288F FALL RISK ASSESSMENT DOCD: CPT | Mod: CPTII,S$GLB,, | Performed by: PHYSICIAN ASSISTANT

## 2021-07-16 PROCEDURE — 3008F BODY MASS INDEX DOCD: CPT | Mod: CPTII,S$GLB,, | Performed by: PHYSICIAN ASSISTANT

## 2021-07-16 PROCEDURE — 1125F AMNT PAIN NOTED PAIN PRSNT: CPT | Mod: S$GLB,,, | Performed by: PHYSICIAN ASSISTANT

## 2021-07-16 PROCEDURE — 99203 OFFICE O/P NEW LOW 30 MIN: CPT | Mod: 25,S$GLB,, | Performed by: PHYSICIAN ASSISTANT

## 2021-07-16 PROCEDURE — 1101F PR PT FALLS ASSESS DOC 0-1 FALLS W/OUT INJ PAST YR: ICD-10-PCS | Mod: CPTII,S$GLB,, | Performed by: PHYSICIAN ASSISTANT

## 2021-07-16 PROCEDURE — 73030 X-RAY EXAM OF SHOULDER: CPT | Mod: 26,LT,, | Performed by: RADIOLOGY

## 2021-07-16 PROCEDURE — 1159F MED LIST DOCD IN RCRD: CPT | Mod: S$GLB,,, | Performed by: PHYSICIAN ASSISTANT

## 2021-07-16 PROCEDURE — 73030 X-RAY EXAM OF SHOULDER: CPT | Mod: TC,LT

## 2021-07-16 PROCEDURE — 1159F PR MEDICATION LIST DOCUMENTED IN MEDICAL RECORD: ICD-10-PCS | Mod: S$GLB,,, | Performed by: PHYSICIAN ASSISTANT

## 2021-07-16 RX ORDER — TRIAMCINOLONE ACETONIDE 40 MG/ML
40 INJECTION, SUSPENSION INTRA-ARTICULAR; INTRAMUSCULAR
Status: DISCONTINUED | OUTPATIENT
Start: 2021-07-16 | End: 2021-07-16 | Stop reason: HOSPADM

## 2021-07-16 RX ADMIN — TRIAMCINOLONE ACETONIDE 40 MG: 40 INJECTION, SUSPENSION INTRA-ARTICULAR; INTRAMUSCULAR at 09:07

## 2021-07-30 DIAGNOSIS — Z01.818 PRE-OP TESTING: ICD-10-CM

## 2021-07-31 ENCOUNTER — LAB VISIT (OUTPATIENT)
Dept: SPORTS MEDICINE | Facility: CLINIC | Age: 71
End: 2021-07-31
Payer: MEDICARE

## 2021-07-31 DIAGNOSIS — Z01.818 PRE-OP TESTING: ICD-10-CM

## 2021-07-31 PROCEDURE — U0005 INFEC AGEN DETEC AMPLI PROBE: HCPCS | Performed by: FAMILY MEDICINE

## 2021-07-31 PROCEDURE — U0003 INFECTIOUS AGENT DETECTION BY NUCLEIC ACID (DNA OR RNA); SEVERE ACUTE RESPIRATORY SYNDROME CORONAVIRUS 2 (SARS-COV-2) (CORONAVIRUS DISEASE [COVID-19]), AMPLIFIED PROBE TECHNIQUE, MAKING USE OF HIGH THROUGHPUT TECHNOLOGIES AS DESCRIBED BY CMS-2020-01-R: HCPCS | Performed by: FAMILY MEDICINE

## 2021-08-02 ENCOUNTER — ANESTHESIA EVENT (OUTPATIENT)
Dept: ENDOSCOPY | Facility: HOSPITAL | Age: 71
End: 2021-08-02
Payer: MEDICARE

## 2021-08-02 LAB
SARS-COV-2 RNA RESP QL NAA+PROBE: NOT DETECTED
SARS-COV-2- CYCLE NUMBER: -1

## 2021-08-03 ENCOUNTER — ANESTHESIA (OUTPATIENT)
Dept: ENDOSCOPY | Facility: HOSPITAL | Age: 71
End: 2021-08-03
Payer: MEDICARE

## 2021-08-03 ENCOUNTER — HOSPITAL ENCOUNTER (OUTPATIENT)
Facility: HOSPITAL | Age: 71
Discharge: HOME | End: 2021-08-03
Attending: INTERNAL MEDICINE | Admitting: INTERNAL MEDICINE
Payer: MEDICARE

## 2021-08-03 VITALS
HEIGHT: 69 IN | WEIGHT: 203 LBS | DIASTOLIC BLOOD PRESSURE: 60 MMHG | OXYGEN SATURATION: 98 % | HEART RATE: 57 BPM | BODY MASS INDEX: 30.07 KG/M2 | TEMPERATURE: 98 F | RESPIRATION RATE: 16 BRPM | SYSTOLIC BLOOD PRESSURE: 110 MMHG

## 2021-08-03 DIAGNOSIS — R13.10 DYSPHAGIA: ICD-10-CM

## 2021-08-03 DIAGNOSIS — K20.90 ESOPHAGITIS: ICD-10-CM

## 2021-08-03 PROCEDURE — 45380 COLONOSCOPY AND BIOPSY: CPT | Performed by: INTERNAL MEDICINE

## 2021-08-03 PROCEDURE — 63600175 PHARM REV CODE 636 W HCPCS: Performed by: INTERNAL MEDICINE

## 2021-08-03 PROCEDURE — 88305 TISSUE EXAM BY PATHOLOGIST: ICD-10-PCS | Mod: 26,,, | Performed by: PATHOLOGY

## 2021-08-03 PROCEDURE — 43235 EGD DIAGNOSTIC BRUSH WASH: CPT | Performed by: INTERNAL MEDICINE

## 2021-08-03 PROCEDURE — 88305 TISSUE EXAM BY PATHOLOGIST: CPT | Mod: 26,,, | Performed by: PATHOLOGY

## 2021-08-03 PROCEDURE — D9220A PRA ANESTHESIA: ICD-10-PCS | Mod: CRNA,,, | Performed by: NURSE ANESTHETIST, CERTIFIED REGISTERED

## 2021-08-03 PROCEDURE — 63600175 PHARM REV CODE 636 W HCPCS: Performed by: NURSE ANESTHETIST, CERTIFIED REGISTERED

## 2021-08-03 PROCEDURE — 27201012 HC FORCEPS, HOT/COLD, DISP: Performed by: INTERNAL MEDICINE

## 2021-08-03 PROCEDURE — 45380 PR COLONOSCOPY,BIOPSY: ICD-10-PCS | Mod: PT,,, | Performed by: INTERNAL MEDICINE

## 2021-08-03 PROCEDURE — 88305 TISSUE EXAM BY PATHOLOGIST: CPT | Performed by: PATHOLOGY

## 2021-08-03 PROCEDURE — 37000009 HC ANESTHESIA EA ADD 15 MINS: Performed by: INTERNAL MEDICINE

## 2021-08-03 PROCEDURE — D9220A PRA ANESTHESIA: ICD-10-PCS | Mod: ANES,,, | Performed by: ANESTHESIOLOGY

## 2021-08-03 PROCEDURE — 43235 PR EGD, FLEX, DIAGNOSTIC: ICD-10-PCS | Mod: 51,,, | Performed by: INTERNAL MEDICINE

## 2021-08-03 PROCEDURE — C9113 INJ PANTOPRAZOLE SODIUM, VIA: HCPCS | Performed by: INTERNAL MEDICINE

## 2021-08-03 PROCEDURE — D9220A PRA ANESTHESIA: Mod: ANES,,, | Performed by: ANESTHESIOLOGY

## 2021-08-03 PROCEDURE — D9220A PRA ANESTHESIA: Mod: CRNA,,, | Performed by: NURSE ANESTHETIST, CERTIFIED REGISTERED

## 2021-08-03 PROCEDURE — 25000003 PHARM REV CODE 250: Performed by: INTERNAL MEDICINE

## 2021-08-03 PROCEDURE — 43235 EGD DIAGNOSTIC BRUSH WASH: CPT | Mod: 51,,, | Performed by: INTERNAL MEDICINE

## 2021-08-03 PROCEDURE — 45380 COLONOSCOPY AND BIOPSY: CPT | Mod: PT,,, | Performed by: INTERNAL MEDICINE

## 2021-08-03 PROCEDURE — 27200997: Performed by: INTERNAL MEDICINE

## 2021-08-03 PROCEDURE — 37000008 HC ANESTHESIA 1ST 15 MINUTES: Performed by: INTERNAL MEDICINE

## 2021-08-03 RX ORDER — LIDOCAINE HYDROCHLORIDE 20 MG/ML
JELLY TOPICAL ONCE
Status: CANCELLED | OUTPATIENT
Start: 2021-08-03

## 2021-08-03 RX ORDER — SODIUM CHLORIDE 9 MG/ML
INJECTION, SOLUTION INTRAVENOUS CONTINUOUS
Status: DISCONTINUED | OUTPATIENT
Start: 2021-08-03 | End: 2021-08-03 | Stop reason: HOSPADM

## 2021-08-03 RX ORDER — HYDROMORPHONE HYDROCHLORIDE 1 MG/ML
0.2 INJECTION, SOLUTION INTRAMUSCULAR; INTRAVENOUS; SUBCUTANEOUS EVERY 5 MIN PRN
Status: CANCELLED | OUTPATIENT
Start: 2021-08-03

## 2021-08-03 RX ORDER — DIPHENHYDRAMINE HYDROCHLORIDE 50 MG/ML
25 INJECTION INTRAMUSCULAR; INTRAVENOUS EVERY 6 HOURS PRN
Status: CANCELLED | OUTPATIENT
Start: 2021-08-03

## 2021-08-03 RX ORDER — SODIUM CHLORIDE 0.9 % (FLUSH) 0.9 %
3 SYRINGE (ML) INJECTION
Status: CANCELLED | OUTPATIENT
Start: 2021-08-03

## 2021-08-03 RX ORDER — LIDOCAINE HCL/PF 100 MG/5ML
SYRINGE (ML) INTRAVENOUS
Status: DISCONTINUED | OUTPATIENT
Start: 2021-08-03 | End: 2021-08-03

## 2021-08-03 RX ORDER — PANTOPRAZOLE SODIUM 40 MG/10ML
40 INJECTION, POWDER, LYOPHILIZED, FOR SOLUTION INTRAVENOUS DAILY
Status: DISCONTINUED | OUTPATIENT
Start: 2021-08-03 | End: 2021-08-03 | Stop reason: HOSPADM

## 2021-08-03 RX ORDER — PANTOPRAZOLE SODIUM 40 MG/1
40 TABLET, DELAYED RELEASE ORAL 2 TIMES DAILY
Qty: 60 TABLET | Refills: 2 | Status: SHIPPED | OUTPATIENT
Start: 2021-08-03 | End: 2021-10-29

## 2021-08-03 RX ORDER — PROPOFOL 10 MG/ML
VIAL (ML) INTRAVENOUS CONTINUOUS PRN
Status: DISCONTINUED | OUTPATIENT
Start: 2021-08-03 | End: 2021-08-03

## 2021-08-03 RX ORDER — FENTANYL CITRATE 50 UG/ML
25 INJECTION, SOLUTION INTRAMUSCULAR; INTRAVENOUS EVERY 5 MIN PRN
Status: CANCELLED | OUTPATIENT
Start: 2021-08-03

## 2021-08-03 RX ORDER — PROPOFOL 10 MG/ML
VIAL (ML) INTRAVENOUS
Status: DISCONTINUED | OUTPATIENT
Start: 2021-08-03 | End: 2021-08-03

## 2021-08-03 RX ORDER — ONDANSETRON 2 MG/ML
4 INJECTION INTRAMUSCULAR; INTRAVENOUS ONCE AS NEEDED
Status: CANCELLED | OUTPATIENT
Start: 2021-08-03 | End: 2032-12-29

## 2021-08-03 RX ADMIN — PROPOFOL 40 MG: 10 INJECTION, EMULSION INTRAVENOUS at 08:08

## 2021-08-03 RX ADMIN — PROPOFOL 150 MCG/KG/MIN: 10 INJECTION, EMULSION INTRAVENOUS at 08:08

## 2021-08-03 RX ADMIN — PANTOPRAZOLE SODIUM 40 MG: 40 INJECTION, POWDER, FOR SOLUTION INTRAVENOUS at 10:08

## 2021-08-03 RX ADMIN — Medication 60 MG: at 08:08

## 2021-08-03 RX ADMIN — SODIUM CHLORIDE: 0.9 INJECTION, SOLUTION INTRAVENOUS at 07:08

## 2021-08-04 ENCOUNTER — PATIENT MESSAGE (OUTPATIENT)
Dept: GASTROENTEROLOGY | Facility: CLINIC | Age: 71
End: 2021-08-04

## 2021-08-04 ENCOUNTER — TELEPHONE (OUTPATIENT)
Dept: GASTROENTEROLOGY | Facility: CLINIC | Age: 71
End: 2021-08-04

## 2021-08-11 LAB
FINAL PATHOLOGIC DIAGNOSIS: NORMAL
GROSS: NORMAL
Lab: NORMAL

## 2021-08-12 ENCOUNTER — PATIENT MESSAGE (OUTPATIENT)
Dept: GASTROENTEROLOGY | Facility: CLINIC | Age: 71
End: 2021-08-12

## 2021-08-17 ENCOUNTER — IMMUNIZATION (OUTPATIENT)
Dept: FAMILY MEDICINE | Facility: CLINIC | Age: 71
End: 2021-08-17
Payer: MEDICARE

## 2021-08-17 ENCOUNTER — PATIENT MESSAGE (OUTPATIENT)
Dept: ORTHOPEDICS | Facility: CLINIC | Age: 71
End: 2021-08-17

## 2021-08-17 DIAGNOSIS — Z23 NEED FOR VACCINATION: Primary | ICD-10-CM

## 2021-08-17 PROCEDURE — 91300 COVID-19, MRNA, LNP-S, PF, 30 MCG/0.3 ML DOSE VACCINE: CPT | Mod: ,,, | Performed by: INTERNAL MEDICINE

## 2021-08-17 PROCEDURE — 91300 COVID-19, MRNA, LNP-S, PF, 30 MCG/0.3 ML DOSE VACCINE: ICD-10-PCS | Mod: ,,, | Performed by: INTERNAL MEDICINE

## 2021-08-17 PROCEDURE — 0003A COVID-19, MRNA, LNP-S, PF, 30 MCG/0.3 ML DOSE VACCINE: ICD-10-PCS | Mod: CV19,,, | Performed by: INTERNAL MEDICINE

## 2021-08-17 PROCEDURE — 0003A COVID-19, MRNA, LNP-S, PF, 30 MCG/0.3 ML DOSE VACCINE: CPT | Mod: CV19,,, | Performed by: INTERNAL MEDICINE

## 2021-08-23 ENCOUNTER — TELEPHONE (OUTPATIENT)
Dept: ORTHOPEDICS | Facility: CLINIC | Age: 71
End: 2021-08-23

## 2021-08-26 ENCOUNTER — HOSPITAL ENCOUNTER (OUTPATIENT)
Dept: RADIOLOGY | Facility: HOSPITAL | Age: 71
Discharge: HOME | End: 2021-08-26
Attending: PHYSICIAN ASSISTANT | Admitting: INTERNAL MEDICINE
Payer: MEDICARE

## 2021-08-26 ENCOUNTER — TELEPHONE (OUTPATIENT)
Dept: ENDOSCOPY | Facility: HOSPITAL | Age: 71
End: 2021-08-26

## 2021-08-26 ENCOUNTER — PATIENT MESSAGE (OUTPATIENT)
Dept: ENDOSCOPY | Facility: HOSPITAL | Age: 71
End: 2021-08-26

## 2021-08-26 ENCOUNTER — OFFICE VISIT (OUTPATIENT)
Dept: ORTHOPEDICS | Facility: CLINIC | Age: 71
End: 2021-08-26
Payer: MEDICARE

## 2021-08-26 VITALS — HEIGHT: 69 IN | BODY MASS INDEX: 30.89 KG/M2 | WEIGHT: 208.56 LBS

## 2021-08-26 DIAGNOSIS — M47.816 LUMBAR SPONDYLOSIS: ICD-10-CM

## 2021-08-26 DIAGNOSIS — Z01.818 PRE-OP TESTING: ICD-10-CM

## 2021-08-26 DIAGNOSIS — S39.012A STRAIN OF LUMBAR REGION, INITIAL ENCOUNTER: ICD-10-CM

## 2021-08-26 DIAGNOSIS — M51.36 DDD (DEGENERATIVE DISC DISEASE), LUMBAR: ICD-10-CM

## 2021-08-26 DIAGNOSIS — M51.36 DDD (DEGENERATIVE DISC DISEASE), LUMBAR: Primary | ICD-10-CM

## 2021-08-26 PROCEDURE — 72110 XR LUMBAR SPINE AP AND LAT WITH FLEX/EXT: ICD-10-PCS | Mod: 26,,, | Performed by: RADIOLOGY

## 2021-08-26 PROCEDURE — 3008F BODY MASS INDEX DOCD: CPT | Mod: CPTII,S$GLB,, | Performed by: PHYSICIAN ASSISTANT

## 2021-08-26 PROCEDURE — 99214 OFFICE O/P EST MOD 30 MIN: CPT | Mod: S$GLB,,, | Performed by: PHYSICIAN ASSISTANT

## 2021-08-26 PROCEDURE — 99999 PR PBB SHADOW E&M-EST. PATIENT-LVL III: CPT | Mod: PBBFAC,,, | Performed by: PHYSICIAN ASSISTANT

## 2021-08-26 PROCEDURE — 1159F PR MEDICATION LIST DOCUMENTED IN MEDICAL RECORD: ICD-10-PCS | Mod: CPTII,S$GLB,, | Performed by: PHYSICIAN ASSISTANT

## 2021-08-26 PROCEDURE — 3288F FALL RISK ASSESSMENT DOCD: CPT | Mod: CPTII,S$GLB,, | Performed by: PHYSICIAN ASSISTANT

## 2021-08-26 PROCEDURE — 3008F PR BODY MASS INDEX (BMI) DOCUMENTED: ICD-10-PCS | Mod: CPTII,S$GLB,, | Performed by: PHYSICIAN ASSISTANT

## 2021-08-26 PROCEDURE — 1160F RVW MEDS BY RX/DR IN RCRD: CPT | Mod: CPTII,S$GLB,, | Performed by: PHYSICIAN ASSISTANT

## 2021-08-26 PROCEDURE — 1101F PT FALLS ASSESS-DOCD LE1/YR: CPT | Mod: CPTII,S$GLB,, | Performed by: PHYSICIAN ASSISTANT

## 2021-08-26 PROCEDURE — 72110 X-RAY EXAM L-2 SPINE 4/>VWS: CPT | Mod: TC

## 2021-08-26 PROCEDURE — 99214 PR OFFICE/OUTPT VISIT, EST, LEVL IV, 30-39 MIN: ICD-10-PCS | Mod: S$GLB,,, | Performed by: PHYSICIAN ASSISTANT

## 2021-08-26 PROCEDURE — 3288F PR FALLS RISK ASSESSMENT DOCUMENTED: ICD-10-PCS | Mod: CPTII,S$GLB,, | Performed by: PHYSICIAN ASSISTANT

## 2021-08-26 PROCEDURE — 72110 X-RAY EXAM L-2 SPINE 4/>VWS: CPT | Mod: 26,,, | Performed by: RADIOLOGY

## 2021-08-26 PROCEDURE — 1125F PR PAIN SEVERITY QUANTIFIED, PAIN PRESENT: ICD-10-PCS | Mod: CPTII,S$GLB,, | Performed by: PHYSICIAN ASSISTANT

## 2021-08-26 PROCEDURE — 1159F MED LIST DOCD IN RCRD: CPT | Mod: CPTII,S$GLB,, | Performed by: PHYSICIAN ASSISTANT

## 2021-08-26 PROCEDURE — 99999 PR PBB SHADOW E&M-EST. PATIENT-LVL III: ICD-10-PCS | Mod: PBBFAC,,, | Performed by: PHYSICIAN ASSISTANT

## 2021-08-26 PROCEDURE — 1125F AMNT PAIN NOTED PAIN PRSNT: CPT | Mod: CPTII,S$GLB,, | Performed by: PHYSICIAN ASSISTANT

## 2021-08-26 PROCEDURE — 1160F PR REVIEW ALL MEDS BY PRESCRIBER/CLIN PHARMACIST DOCUMENTED: ICD-10-PCS | Mod: CPTII,S$GLB,, | Performed by: PHYSICIAN ASSISTANT

## 2021-08-26 PROCEDURE — 1101F PR PT FALLS ASSESS DOC 0-1 FALLS W/OUT INJ PAST YR: ICD-10-PCS | Mod: CPTII,S$GLB,, | Performed by: PHYSICIAN ASSISTANT

## 2021-08-26 RX ORDER — METHOCARBAMOL 500 MG/1
500 TABLET, FILM COATED ORAL 3 TIMES DAILY PRN
Qty: 30 TABLET | Refills: 0 | Status: SHIPPED | OUTPATIENT
Start: 2021-08-26 | End: 2021-09-05

## 2021-08-26 RX ORDER — METHYLPREDNISOLONE 4 MG/1
TABLET ORAL
Qty: 1 PACKAGE | Refills: 0 | Status: SHIPPED | OUTPATIENT
Start: 2021-08-26 | End: 2021-09-16

## 2021-08-31 ENCOUNTER — PATIENT MESSAGE (OUTPATIENT)
Dept: ENDOSCOPY | Facility: HOSPITAL | Age: 71
End: 2021-08-31

## 2021-09-27 ENCOUNTER — PATIENT MESSAGE (OUTPATIENT)
Dept: ORTHOPEDICS | Facility: CLINIC | Age: 71
End: 2021-09-27

## 2021-10-20 ENCOUNTER — CLINICAL SUPPORT (OUTPATIENT)
Dept: REHABILITATION | Facility: HOSPITAL | Age: 71
End: 2021-10-20
Payer: MEDICARE

## 2021-10-20 DIAGNOSIS — M25.512 CHRONIC LEFT SHOULDER PAIN: ICD-10-CM

## 2021-10-20 DIAGNOSIS — M75.42 IMPINGEMENT SYNDROME OF LEFT SHOULDER: ICD-10-CM

## 2021-10-20 DIAGNOSIS — G89.29 CHRONIC LEFT SHOULDER PAIN: ICD-10-CM

## 2021-10-20 DIAGNOSIS — M54.2 CHRONIC NECK PAIN: ICD-10-CM

## 2021-10-20 DIAGNOSIS — M50.30 DDD (DEGENERATIVE DISC DISEASE), CERVICAL: ICD-10-CM

## 2021-10-20 DIAGNOSIS — G89.29 CHRONIC NECK PAIN: ICD-10-CM

## 2021-10-20 DIAGNOSIS — M62.81 MUSCLE WEAKNESS: ICD-10-CM

## 2021-10-20 PROCEDURE — 97161 PT EVAL LOW COMPLEX 20 MIN: CPT | Mod: PN

## 2021-10-21 ENCOUNTER — PATIENT MESSAGE (OUTPATIENT)
Dept: GASTROENTEROLOGY | Facility: CLINIC | Age: 71
End: 2021-10-21
Payer: MEDICARE

## 2021-10-21 ENCOUNTER — TELEPHONE (OUTPATIENT)
Dept: ENDOSCOPY | Facility: HOSPITAL | Age: 71
End: 2021-10-21

## 2021-11-05 ENCOUNTER — TELEPHONE (OUTPATIENT)
Dept: OBSTETRICS AND GYNECOLOGY | Facility: CLINIC | Age: 71
End: 2021-11-05
Payer: MEDICARE

## 2021-11-05 DIAGNOSIS — Z12.31 ENCOUNTER FOR SCREENING MAMMOGRAM FOR MALIGNANT NEOPLASM OF BREAST: Primary | ICD-10-CM

## 2021-11-12 ENCOUNTER — CLINICAL SUPPORT (OUTPATIENT)
Dept: REHABILITATION | Facility: HOSPITAL | Age: 71
End: 2021-11-12
Payer: MEDICARE

## 2021-11-12 DIAGNOSIS — M62.81 MUSCLE WEAKNESS: ICD-10-CM

## 2021-11-12 DIAGNOSIS — G89.29 CHRONIC LEFT SHOULDER PAIN: ICD-10-CM

## 2021-11-12 DIAGNOSIS — M25.512 CHRONIC LEFT SHOULDER PAIN: ICD-10-CM

## 2021-11-12 PROCEDURE — 97110 THERAPEUTIC EXERCISES: CPT | Mod: PN,CQ

## 2021-11-16 ENCOUNTER — CLINICAL SUPPORT (OUTPATIENT)
Dept: REHABILITATION | Facility: HOSPITAL | Age: 71
End: 2021-11-16
Payer: MEDICARE

## 2021-11-16 DIAGNOSIS — G89.29 CHRONIC LEFT SHOULDER PAIN: ICD-10-CM

## 2021-11-16 DIAGNOSIS — M62.81 MUSCLE WEAKNESS: ICD-10-CM

## 2021-11-16 DIAGNOSIS — M25.512 CHRONIC LEFT SHOULDER PAIN: ICD-10-CM

## 2021-11-16 PROCEDURE — 97140 MANUAL THERAPY 1/> REGIONS: CPT | Mod: PN,CQ

## 2021-11-16 PROCEDURE — 97110 THERAPEUTIC EXERCISES: CPT | Mod: PN,CQ

## 2021-11-18 ENCOUNTER — CLINICAL SUPPORT (OUTPATIENT)
Dept: REHABILITATION | Facility: HOSPITAL | Age: 71
End: 2021-11-18
Payer: MEDICARE

## 2021-11-18 DIAGNOSIS — M62.81 MUSCLE WEAKNESS: ICD-10-CM

## 2021-11-18 DIAGNOSIS — M25.512 CHRONIC LEFT SHOULDER PAIN: ICD-10-CM

## 2021-11-18 DIAGNOSIS — G89.29 CHRONIC LEFT SHOULDER PAIN: ICD-10-CM

## 2021-11-18 PROCEDURE — 97140 MANUAL THERAPY 1/> REGIONS: CPT | Mod: PN

## 2021-11-18 PROCEDURE — 97110 THERAPEUTIC EXERCISES: CPT | Mod: PN

## 2021-11-23 ENCOUNTER — CLINICAL SUPPORT (OUTPATIENT)
Dept: REHABILITATION | Facility: HOSPITAL | Age: 71
End: 2021-11-23
Payer: MEDICARE

## 2021-11-23 DIAGNOSIS — M62.81 MUSCLE WEAKNESS: ICD-10-CM

## 2021-11-23 DIAGNOSIS — M25.512 CHRONIC LEFT SHOULDER PAIN: ICD-10-CM

## 2021-11-23 DIAGNOSIS — G89.29 CHRONIC LEFT SHOULDER PAIN: ICD-10-CM

## 2021-11-23 PROCEDURE — 97112 NEUROMUSCULAR REEDUCATION: CPT | Mod: PN

## 2021-11-24 ENCOUNTER — TELEPHONE (OUTPATIENT)
Dept: REHABILITATION | Facility: HOSPITAL | Age: 71
End: 2021-11-24
Payer: MEDICARE

## 2021-11-29 ENCOUNTER — OFFICE VISIT (OUTPATIENT)
Dept: RHEUMATOLOGY | Facility: CLINIC | Age: 71
End: 2021-11-29
Payer: MEDICARE

## 2021-11-29 ENCOUNTER — LAB VISIT (OUTPATIENT)
Dept: LAB | Facility: HOSPITAL | Age: 71
End: 2021-11-29
Attending: INTERNAL MEDICINE
Payer: MEDICARE

## 2021-11-29 VITALS
SYSTOLIC BLOOD PRESSURE: 118 MMHG | HEIGHT: 69 IN | WEIGHT: 215.38 LBS | DIASTOLIC BLOOD PRESSURE: 72 MMHG | BODY MASS INDEX: 31.9 KG/M2 | HEART RATE: 72 BPM

## 2021-11-29 DIAGNOSIS — J84.9 ILD (INTERSTITIAL LUNG DISEASE): ICD-10-CM

## 2021-11-29 DIAGNOSIS — I27.20 PULMONARY HYPERTENSION: ICD-10-CM

## 2021-11-29 DIAGNOSIS — R53.83 FATIGUE, UNSPECIFIED TYPE: ICD-10-CM

## 2021-11-29 DIAGNOSIS — S32.010D CLOSED WEDGE COMPRESSION FRACTURE OF L1 VERTEBRA WITH ROUTINE HEALING, SUBSEQUENT ENCOUNTER: ICD-10-CM

## 2021-11-29 DIAGNOSIS — D84.9 IMMUNOSUPPRESSION: ICD-10-CM

## 2021-11-29 DIAGNOSIS — M35.00 SJOGREN'S SYNDROME, WITH UNSPECIFIED ORGAN INVOLVEMENT: ICD-10-CM

## 2021-11-29 DIAGNOSIS — M80.00XD OSTEOPOROSIS WITH CURRENT PATHOLOGICAL FRACTURE WITH ROUTINE HEALING, UNSPECIFIED OSTEOPOROSIS TYPE, SUBSEQUENT ENCOUNTER: ICD-10-CM

## 2021-11-29 DIAGNOSIS — M35.00 SJOGREN'S SYNDROME, WITH UNSPECIFIED ORGAN INVOLVEMENT: Primary | ICD-10-CM

## 2021-11-29 DIAGNOSIS — M80.08XA AGE-RELATED OSTEOPOROSIS WITH CURRENT PATHOLOGICAL FRACTURE, VERTEBRA(E), INITIAL ENCOUNTER FOR FRACTURE: ICD-10-CM

## 2021-11-29 LAB
ALBUMIN SERPL BCP-MCNC: 3.9 G/DL (ref 3.5–5.2)
ALP SERPL-CCNC: 99 U/L (ref 55–135)
ALT SERPL W/O P-5'-P-CCNC: 12 U/L (ref 10–44)
ANION GAP SERPL CALC-SCNC: 9 MMOL/L (ref 8–16)
AST SERPL-CCNC: 16 U/L (ref 10–40)
BASOPHILS # BLD AUTO: 0.01 K/UL (ref 0–0.2)
BASOPHILS NFR BLD: 0.2 % (ref 0–1.9)
BILIRUB SERPL-MCNC: 0.4 MG/DL (ref 0.1–1)
BNP SERPL-MCNC: 40 PG/ML (ref 0–99)
BUN SERPL-MCNC: 13 MG/DL (ref 8–23)
C3 SERPL-MCNC: 135 MG/DL (ref 50–180)
C4 SERPL-MCNC: 34 MG/DL (ref 11–44)
CALCIUM SERPL-MCNC: 9.8 MG/DL (ref 8.7–10.5)
CHLORIDE SERPL-SCNC: 97 MMOL/L (ref 95–110)
CK SERPL-CCNC: 49 U/L (ref 20–180)
CO2 SERPL-SCNC: 36 MMOL/L (ref 23–29)
CREAT SERPL-MCNC: 0.8 MG/DL (ref 0.5–1.4)
CRP SERPL-MCNC: 13.5 MG/L (ref 0–8.2)
DIFFERENTIAL METHOD: ABNORMAL
EOSINOPHIL # BLD AUTO: 0.1 K/UL (ref 0–0.5)
EOSINOPHIL NFR BLD: 1.5 % (ref 0–8)
ERYTHROCYTE [DISTWIDTH] IN BLOOD BY AUTOMATED COUNT: 12.2 % (ref 11.5–14.5)
ERYTHROCYTE [SEDIMENTATION RATE] IN BLOOD BY WESTERGREN METHOD: 27 MM/HR (ref 0–36)
EST. GFR  (AFRICAN AMERICAN): >60 ML/MIN/1.73 M^2
EST. GFR  (NON AFRICAN AMERICAN): >60 ML/MIN/1.73 M^2
GLUCOSE SERPL-MCNC: 96 MG/DL (ref 70–110)
HCT VFR BLD AUTO: 40 % (ref 37–48.5)
HGB BLD-MCNC: 12.5 G/DL (ref 12–16)
IMM GRANULOCYTES # BLD AUTO: 0.02 K/UL (ref 0–0.04)
IMM GRANULOCYTES NFR BLD AUTO: 0.4 % (ref 0–0.5)
LYMPHOCYTES # BLD AUTO: 1.1 K/UL (ref 1–4.8)
LYMPHOCYTES NFR BLD: 19.9 % (ref 18–48)
MCH RBC QN AUTO: 31.5 PG (ref 27–31)
MCHC RBC AUTO-ENTMCNC: 31.3 G/DL (ref 32–36)
MCV RBC AUTO: 101 FL (ref 82–98)
MONOCYTES # BLD AUTO: 0.5 K/UL (ref 0.3–1)
MONOCYTES NFR BLD: 8.6 % (ref 4–15)
NEUTROPHILS # BLD AUTO: 3.7 K/UL (ref 1.8–7.7)
NEUTROPHILS NFR BLD: 69.4 % (ref 38–73)
NRBC BLD-RTO: 0 /100 WBC
PLATELET # BLD AUTO: 232 K/UL (ref 150–450)
PMV BLD AUTO: 9 FL (ref 9.2–12.9)
POTASSIUM SERPL-SCNC: 3.7 MMOL/L (ref 3.5–5.1)
PROT SERPL-MCNC: 6.8 G/DL (ref 6–8.4)
RBC # BLD AUTO: 3.97 M/UL (ref 4–5.4)
SODIUM SERPL-SCNC: 142 MMOL/L (ref 136–145)
WBC # BLD AUTO: 5.37 K/UL (ref 3.9–12.7)

## 2021-11-29 PROCEDURE — 85652 RBC SED RATE AUTOMATED: CPT | Performed by: INTERNAL MEDICINE

## 2021-11-29 PROCEDURE — 99999 PR PBB SHADOW E&M-EST. PATIENT-LVL III: CPT | Mod: PBBFAC,,, | Performed by: INTERNAL MEDICINE

## 2021-11-29 PROCEDURE — 86160 COMPLEMENT ANTIGEN: CPT | Performed by: INTERNAL MEDICINE

## 2021-11-29 PROCEDURE — 85025 COMPLETE CBC W/AUTO DIFF WBC: CPT | Performed by: INTERNAL MEDICINE

## 2021-11-29 PROCEDURE — 99215 OFFICE O/P EST HI 40 MIN: CPT | Mod: S$GLB,,, | Performed by: INTERNAL MEDICINE

## 2021-11-29 PROCEDURE — 86160 COMPLEMENT ANTIGEN: CPT | Mod: 59 | Performed by: INTERNAL MEDICINE

## 2021-11-29 PROCEDURE — 36415 COLL VENOUS BLD VENIPUNCTURE: CPT | Performed by: INTERNAL MEDICINE

## 2021-11-29 PROCEDURE — 99215 PR OFFICE/OUTPT VISIT, EST, LEVL V, 40-54 MIN: ICD-10-PCS | Mod: S$GLB,,, | Performed by: INTERNAL MEDICINE

## 2021-11-29 PROCEDURE — 80053 COMPREHEN METABOLIC PANEL: CPT | Performed by: INTERNAL MEDICINE

## 2021-11-29 PROCEDURE — 86140 C-REACTIVE PROTEIN: CPT | Performed by: INTERNAL MEDICINE

## 2021-11-29 PROCEDURE — 83880 ASSAY OF NATRIURETIC PEPTIDE: CPT | Performed by: INTERNAL MEDICINE

## 2021-11-29 PROCEDURE — 99999 PR PBB SHADOW E&M-EST. PATIENT-LVL III: ICD-10-PCS | Mod: PBBFAC,,, | Performed by: INTERNAL MEDICINE

## 2021-11-29 PROCEDURE — 86225 DNA ANTIBODY NATIVE: CPT | Performed by: INTERNAL MEDICINE

## 2021-11-29 PROCEDURE — 82550 ASSAY OF CK (CPK): CPT | Performed by: INTERNAL MEDICINE

## 2021-11-30 ENCOUNTER — PATIENT MESSAGE (OUTPATIENT)
Dept: RHEUMATOLOGY | Facility: CLINIC | Age: 71
End: 2021-11-30
Payer: MEDICARE

## 2021-11-30 ENCOUNTER — CLINICAL SUPPORT (OUTPATIENT)
Dept: REHABILITATION | Facility: HOSPITAL | Age: 71
End: 2021-11-30
Payer: MEDICARE

## 2021-11-30 DIAGNOSIS — M62.81 MUSCLE WEAKNESS: ICD-10-CM

## 2021-11-30 DIAGNOSIS — M25.512 CHRONIC LEFT SHOULDER PAIN: ICD-10-CM

## 2021-11-30 DIAGNOSIS — G89.29 CHRONIC LEFT SHOULDER PAIN: ICD-10-CM

## 2021-11-30 LAB — DSDNA AB SER-ACNC: NORMAL [IU]/ML

## 2021-11-30 PROCEDURE — 97110 THERAPEUTIC EXERCISES: CPT | Mod: PN

## 2021-12-02 ENCOUNTER — DOCUMENTATION ONLY (OUTPATIENT)
Dept: REHABILITATION | Facility: HOSPITAL | Age: 71
End: 2021-12-02
Payer: MEDICARE

## 2021-12-02 ENCOUNTER — CLINICAL SUPPORT (OUTPATIENT)
Dept: REHABILITATION | Facility: HOSPITAL | Age: 71
End: 2021-12-02
Payer: MEDICARE

## 2021-12-02 DIAGNOSIS — G89.29 CHRONIC LEFT SHOULDER PAIN: ICD-10-CM

## 2021-12-02 DIAGNOSIS — M62.81 MUSCLE WEAKNESS: ICD-10-CM

## 2021-12-02 DIAGNOSIS — M25.512 CHRONIC LEFT SHOULDER PAIN: ICD-10-CM

## 2021-12-02 PROCEDURE — 97110 THERAPEUTIC EXERCISES: CPT | Mod: PN

## 2021-12-07 ENCOUNTER — CLINICAL SUPPORT (OUTPATIENT)
Dept: REHABILITATION | Facility: HOSPITAL | Age: 71
End: 2021-12-07
Payer: MEDICARE

## 2021-12-07 DIAGNOSIS — M25.512 CHRONIC LEFT SHOULDER PAIN: ICD-10-CM

## 2021-12-07 DIAGNOSIS — G89.29 CHRONIC LEFT SHOULDER PAIN: ICD-10-CM

## 2021-12-07 DIAGNOSIS — M62.81 MUSCLE WEAKNESS: ICD-10-CM

## 2021-12-07 PROCEDURE — 97110 THERAPEUTIC EXERCISES: CPT | Mod: PN,CQ

## 2021-12-14 ENCOUNTER — TELEPHONE (OUTPATIENT)
Dept: REHABILITATION | Facility: HOSPITAL | Age: 71
End: 2021-12-14
Payer: MEDICARE

## 2021-12-14 ENCOUNTER — PATIENT MESSAGE (OUTPATIENT)
Dept: GASTROENTEROLOGY | Facility: CLINIC | Age: 71
End: 2021-12-14
Payer: MEDICARE

## 2021-12-16 ENCOUNTER — TELEPHONE (OUTPATIENT)
Dept: ENDOSCOPY | Facility: HOSPITAL | Age: 71
End: 2021-12-16
Payer: MEDICARE

## 2021-12-16 ENCOUNTER — PATIENT MESSAGE (OUTPATIENT)
Dept: ENDOSCOPY | Facility: HOSPITAL | Age: 71
End: 2021-12-16
Payer: MEDICARE

## 2022-01-03 ENCOUNTER — HOSPITAL ENCOUNTER (OUTPATIENT)
Dept: RADIOLOGY | Facility: HOSPITAL | Age: 72
Discharge: HOME | End: 2022-01-03
Attending: INTERNAL MEDICINE
Payer: MEDICARE

## 2022-01-03 DIAGNOSIS — M80.08XA AGE-RELATED OSTEOPOROSIS WITH CURRENT PATHOLOGICAL FRACTURE, VERTEBRA(E), INITIAL ENCOUNTER FOR FRACTURE: ICD-10-CM

## 2022-01-03 DIAGNOSIS — M80.00XD OSTEOPOROSIS WITH CURRENT PATHOLOGICAL FRACTURE WITH ROUTINE HEALING, UNSPECIFIED OSTEOPOROSIS TYPE, SUBSEQUENT ENCOUNTER: ICD-10-CM

## 2022-01-03 DIAGNOSIS — S32.010D CLOSED WEDGE COMPRESSION FRACTURE OF L1 VERTEBRA WITH ROUTINE HEALING, SUBSEQUENT ENCOUNTER: ICD-10-CM

## 2022-01-03 PROCEDURE — 77080 DXA BONE DENSITY AXIAL: CPT | Mod: TC

## 2022-01-03 PROCEDURE — 77080 DEXA BONE DENSITY SPINE HIP: ICD-10-PCS | Mod: 26,,, | Performed by: RADIOLOGY

## 2022-01-03 PROCEDURE — 77080 DXA BONE DENSITY AXIAL: CPT | Mod: 26,,, | Performed by: RADIOLOGY

## 2022-01-04 ENCOUNTER — PATIENT MESSAGE (OUTPATIENT)
Dept: RHEUMATOLOGY | Facility: CLINIC | Age: 72
End: 2022-01-04
Payer: MEDICARE

## 2022-01-26 ENCOUNTER — HOSPITAL ENCOUNTER (OUTPATIENT)
Dept: RADIOLOGY | Facility: HOSPITAL | Age: 72
Discharge: HOME | End: 2022-01-26
Attending: OBSTETRICS & GYNECOLOGY
Payer: MEDICARE

## 2022-01-26 DIAGNOSIS — Z12.31 ENCOUNTER FOR SCREENING MAMMOGRAM FOR MALIGNANT NEOPLASM OF BREAST: ICD-10-CM

## 2022-01-26 PROCEDURE — 77067 SCR MAMMO BI INCL CAD: CPT | Mod: 26,,, | Performed by: RADIOLOGY

## 2022-01-26 PROCEDURE — 77063 BREAST TOMOSYNTHESIS BI: CPT | Mod: 26,,, | Performed by: RADIOLOGY

## 2022-01-26 PROCEDURE — 77063 MAMMO DIGITAL SCREENING BILAT WITH TOMO: ICD-10-PCS | Mod: 26,,, | Performed by: RADIOLOGY

## 2022-01-26 PROCEDURE — 77067 MAMMO DIGITAL SCREENING BILAT WITH TOMO: ICD-10-PCS | Mod: 26,,, | Performed by: RADIOLOGY

## 2022-01-26 PROCEDURE — 77063 BREAST TOMOSYNTHESIS BI: CPT | Mod: TC

## 2022-02-08 DIAGNOSIS — Z01.818 PRE-OP TESTING: Primary | ICD-10-CM

## 2022-02-13 ENCOUNTER — PATIENT MESSAGE (OUTPATIENT)
Dept: ENDOSCOPY | Facility: HOSPITAL | Age: 72
End: 2022-02-13
Payer: MEDICARE

## 2022-02-15 ENCOUNTER — PATIENT MESSAGE (OUTPATIENT)
Dept: ENDOSCOPY | Facility: HOSPITAL | Age: 72
End: 2022-02-15
Payer: MEDICARE

## 2022-02-23 DIAGNOSIS — D84.9 IMMUNOSUPPRESSED STATUS: ICD-10-CM

## 2022-04-10 ENCOUNTER — PATIENT MESSAGE (OUTPATIENT)
Dept: GASTROENTEROLOGY | Facility: CLINIC | Age: 72
End: 2022-04-10
Payer: MEDICARE

## 2022-04-21 ENCOUNTER — IMMUNIZATION (OUTPATIENT)
Dept: INTERNAL MEDICINE | Facility: CLINIC | Age: 72
End: 2022-04-21
Payer: MEDICARE

## 2022-04-21 DIAGNOSIS — Z23 NEED FOR VACCINATION: Primary | ICD-10-CM

## 2022-04-21 PROCEDURE — 91305 COVID-19, MRNA, LNP-S, PF, 30 MCG/0.3 ML DOSE VACCINE (PFIZER): CPT | Mod: PBBFAC | Performed by: FAMILY MEDICINE

## 2022-04-22 PROBLEM — D33.3 LEFT ACOUSTIC NEUROMA: Status: ACTIVE | Noted: 2022-04-08

## 2022-05-05 ENCOUNTER — PATIENT MESSAGE (OUTPATIENT)
Dept: RHEUMATOLOGY | Facility: CLINIC | Age: 72
End: 2022-05-05
Payer: MEDICARE

## 2022-05-06 ENCOUNTER — PATIENT MESSAGE (OUTPATIENT)
Dept: RHEUMATOLOGY | Facility: CLINIC | Age: 72
End: 2022-05-06
Payer: MEDICARE

## 2022-05-06 ENCOUNTER — TELEPHONE (OUTPATIENT)
Dept: RHEUMATOLOGY | Facility: CLINIC | Age: 72
End: 2022-05-06
Payer: MEDICARE

## 2022-05-06 DIAGNOSIS — M35.00 SJOGREN'S SYNDROME, WITH UNSPECIFIED ORGAN INVOLVEMENT: Primary | ICD-10-CM

## 2022-05-09 ENCOUNTER — OFFICE VISIT (OUTPATIENT)
Dept: RHEUMATOLOGY | Facility: CLINIC | Age: 72
End: 2022-05-09
Payer: MEDICARE

## 2022-05-09 ENCOUNTER — LAB VISIT (OUTPATIENT)
Dept: LAB | Facility: HOSPITAL | Age: 72
End: 2022-05-09
Attending: INTERNAL MEDICINE
Payer: MEDICARE

## 2022-05-09 VITALS
SYSTOLIC BLOOD PRESSURE: 125 MMHG | HEART RATE: 70 BPM | DIASTOLIC BLOOD PRESSURE: 61 MMHG | BODY MASS INDEX: 30.83 KG/M2 | WEIGHT: 208.13 LBS | HEIGHT: 69 IN | TEMPERATURE: 98 F

## 2022-05-09 DIAGNOSIS — M79.642 BILATERAL HAND PAIN: ICD-10-CM

## 2022-05-09 DIAGNOSIS — J84.9 ILD (INTERSTITIAL LUNG DISEASE): ICD-10-CM

## 2022-05-09 DIAGNOSIS — R53.83 FATIGUE, UNSPECIFIED TYPE: ICD-10-CM

## 2022-05-09 DIAGNOSIS — D84.9 IMMUNOSUPPRESSION: ICD-10-CM

## 2022-05-09 DIAGNOSIS — M35.00 SJOGREN'S SYNDROME, WITH UNSPECIFIED ORGAN INVOLVEMENT: Primary | ICD-10-CM

## 2022-05-09 DIAGNOSIS — I27.20 PULMONARY HYPERTENSION: ICD-10-CM

## 2022-05-09 DIAGNOSIS — M35.00 SJOGREN'S SYNDROME, WITH UNSPECIFIED ORGAN INVOLVEMENT: ICD-10-CM

## 2022-05-09 DIAGNOSIS — M79.641 BILATERAL HAND PAIN: ICD-10-CM

## 2022-05-09 LAB
BILIRUB UR QL STRIP: NEGATIVE
CLARITY UR REFRACT.AUTO: ABNORMAL
COLOR UR AUTO: ABNORMAL
CREAT UR-MCNC: 22 MG/DL (ref 15–325)
GLUCOSE UR QL STRIP: NEGATIVE
HGB UR QL STRIP: NEGATIVE
KETONES UR QL STRIP: NEGATIVE
LEUKOCYTE ESTERASE UR QL STRIP: NEGATIVE
NITRITE UR QL STRIP: NEGATIVE
PH UR STRIP: 6 [PH] (ref 5–8)
PROT UR QL STRIP: NEGATIVE
PROT UR-MCNC: <7 MG/DL (ref 0–15)
PROT/CREAT UR: NORMAL MG/G{CREAT} (ref 0–0.2)
SP GR UR STRIP: 1 (ref 1–1.03)
URN SPEC COLLECT METH UR: ABNORMAL

## 2022-05-09 PROCEDURE — 99999 PR PBB SHADOW E&M-EST. PATIENT-LVL IV: CPT | Mod: PBBFAC,,, | Performed by: INTERNAL MEDICINE

## 2022-05-09 PROCEDURE — 3008F BODY MASS INDEX DOCD: CPT | Mod: CPTII,S$GLB,, | Performed by: INTERNAL MEDICINE

## 2022-05-09 PROCEDURE — 99214 PR OFFICE/OUTPT VISIT, EST, LEVL IV, 30-39 MIN: ICD-10-PCS | Mod: S$GLB,,, | Performed by: INTERNAL MEDICINE

## 2022-05-09 PROCEDURE — 1160F PR REVIEW ALL MEDS BY PRESCRIBER/CLIN PHARMACIST DOCUMENTED: ICD-10-PCS | Mod: CPTII,S$GLB,, | Performed by: INTERNAL MEDICINE

## 2022-05-09 PROCEDURE — 81003 URINALYSIS AUTO W/O SCOPE: CPT | Performed by: INTERNAL MEDICINE

## 2022-05-09 PROCEDURE — 84156 ASSAY OF PROTEIN URINE: CPT | Performed by: INTERNAL MEDICINE

## 2022-05-09 PROCEDURE — 99214 OFFICE O/P EST MOD 30 MIN: CPT | Mod: S$GLB,,, | Performed by: INTERNAL MEDICINE

## 2022-05-09 PROCEDURE — 1125F AMNT PAIN NOTED PAIN PRSNT: CPT | Mod: CPTII,S$GLB,, | Performed by: INTERNAL MEDICINE

## 2022-05-09 PROCEDURE — 3074F SYST BP LT 130 MM HG: CPT | Mod: CPTII,S$GLB,, | Performed by: INTERNAL MEDICINE

## 2022-05-09 PROCEDURE — 3074F PR MOST RECENT SYSTOLIC BLOOD PRESSURE < 130 MM HG: ICD-10-PCS | Mod: CPTII,S$GLB,, | Performed by: INTERNAL MEDICINE

## 2022-05-09 PROCEDURE — 3078F DIAST BP <80 MM HG: CPT | Mod: CPTII,S$GLB,, | Performed by: INTERNAL MEDICINE

## 2022-05-09 PROCEDURE — 1159F PR MEDICATION LIST DOCUMENTED IN MEDICAL RECORD: ICD-10-PCS | Mod: CPTII,S$GLB,, | Performed by: INTERNAL MEDICINE

## 2022-05-09 PROCEDURE — 1159F MED LIST DOCD IN RCRD: CPT | Mod: CPTII,S$GLB,, | Performed by: INTERNAL MEDICINE

## 2022-05-09 PROCEDURE — 3078F PR MOST RECENT DIASTOLIC BLOOD PRESSURE < 80 MM HG: ICD-10-PCS | Mod: CPTII,S$GLB,, | Performed by: INTERNAL MEDICINE

## 2022-05-09 PROCEDURE — 1125F PR PAIN SEVERITY QUANTIFIED, PAIN PRESENT: ICD-10-PCS | Mod: CPTII,S$GLB,, | Performed by: INTERNAL MEDICINE

## 2022-05-09 PROCEDURE — 1160F RVW MEDS BY RX/DR IN RCRD: CPT | Mod: CPTII,S$GLB,, | Performed by: INTERNAL MEDICINE

## 2022-05-09 PROCEDURE — 99999 PR PBB SHADOW E&M-EST. PATIENT-LVL IV: ICD-10-PCS | Mod: PBBFAC,,, | Performed by: INTERNAL MEDICINE

## 2022-05-09 PROCEDURE — 3008F PR BODY MASS INDEX (BMI) DOCUMENTED: ICD-10-PCS | Mod: CPTII,S$GLB,, | Performed by: INTERNAL MEDICINE

## 2022-05-09 ASSESSMENT — ROUTINE ASSESSMENT OF PATIENT INDEX DATA (RAPID3)
PATIENT GLOBAL ASSESSMENT SCORE: 2.5
MDHAQ FUNCTION SCORE: 0.2
PAIN SCORE: 3
WHEN YOU AWAKENED IN THE MORNING OVER THE LAST WEEK, PLEASE INDICATE THE AMOUNT OF TIME IT TAKES UNTIL YOU ARE AS LIMBER AS YOU WILL BE FOR THE DAY: 15 MINUTES
PSYCHOLOGICAL DISTRESS SCORE: 4.4
TOTAL RAPID3 SCORE: 2.06
FATIGUE SCORE: 3
AM STIFFNESS SCORE: 1, YES

## 2022-05-09 NOTE — PROGRESS NOTES
Subjective:       Patient ID: Bharti Woodard is a 71 y.o. female.    Chief Complaint: Sjogrens and ILD    HPI:  Bharti Woodard is a 71 y.o. female with history of Sjogrens diagnosed 50-59 yo. She has difficulty remembering history.   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  Dr. Aldridge at [facility] saw her and diagnosed with lupus and gave prednisone and Plaquenil but she gained 60 pounds.    Saw Dr. Tirado a rheumatologist who diagnosed Sjogrens and sent her a pulmonologist.  Dr. Tirado gave her Mycophenolate.  Pulmonary HTN diagnosed 2010 with Dr. Evans.  [place]unez [place] [facility] and was planning to cut down on medication in future.  Patient decided to lower in her own so they felt she was noncompliant.  Patient decided to switch.   Saw Dr. Dunne who did not want to treat her since care needed to be coordinated with rheumatologist.  So she saw Dr. Cabrera and Dr. Herbert.      January 2020 felt very sick.  Tested negative for flu.  She was given antibiotics.  Since then has been fatigued with intermittent joint pains as well as tingling in her feet.     Tylenol 650 mg three times daily helps pain in neck, feet, ankle and hands.  Today 2/10 ache today.  Nothing worsens pain.  Pain began after URI treated in January 2020.       Review of chart shows that patient was seen on 2/15/2005 by Dr. Lenora Ackerman in rheumatology for a second opinion.  Per that note patient had persistently elevate sedimentation rate and was diagnosed as PMR by Dr. Aldridge who treated her with prednisone that was later stopped.  Plaquenil was given but caused all over body rash.    Care Everywhere:  1/29/2019 Dr. Herbert note  Biopsy:   Biopsy at  10/2012: diffuse scattered non-necrotizing granulomas (special stains negative) with some areas of fibrosis, but mostly normal lung; scattered lymphoid aggregates    Labs:  November 2014: SSA positive  VENITA 1:1280, positive dsDNA, centromere and Sm/RNP, negative Sm,  "Scl70, RF, SSB       INTERVAL HISTORY:    Difficulty gripping objects and opening jars.    Had echo cardiogram recently not sure of results.  Now doing well with swallowing.      Initially had severe pain and had x-ray that showed L1 compression fracture.  Has burning sensation in back.  Still usses eye drops for lubricating 1-2 times day prn. Using Restasis.  Right knee doing well.  Will see Dr. Willson.             Review of Systems   Constitutional: Positive for fatigue. Negative for fever and unexpected weight change.   HENT: Negative for mouth sores and trouble swallowing.    Eyes: Negative for redness.   Respiratory: Negative for cough and shortness of breath.    Cardiovascular: Negative for chest pain.   Gastrointestinal: Negative for constipation and diarrhea.   Endocrine: Negative.    Genitourinary: Negative for dysuria and genital sores.   Musculoskeletal: Negative for arthralgias.   Skin: Negative for rash.   Neurological: Negative for headaches.   Hematological: Does not bruise/bleed easily.         Objective:   /61   Pulse 70   Temp 98 °F (36.7 °C) (Oral)   Ht 5' 9" (1.753 m)   Wt 94.4 kg (208 lb 1.8 oz)   LMP 01/01/1976   BMI 30.73 kg/m²      Physical Exam   Constitutional: She is oriented to person, place, and time.   HENT:   Head: Normocephalic and atraumatic.   Eyes: Conjunctivae are normal.   Cardiovascular: Normal rate, regular rhythm and normal heart sounds.   Pulmonary/Chest: Effort normal and breath sounds normal.   Abdominal: Soft. Bowel sounds are normal.   Musculoskeletal:         General: Tenderness present. No deformity.      Cervical back: Neck supple.      Comments: Multiple Heberdens nodes  Pain on compression of MTPs bilateral   Neurological: She is alert and oriented to person, place, and time.   Skin: Skin is warm and dry.   Psychiatric: Mood and affect normal.       LABS    Component      Latest Ref Rng & Units 5/6/2022   Sed Rate      0 - 20 mm/Hr 40 (H)   CRP      0.0 - " 8.2 mg/L 6.3   CPK      55 - 170 U/L 31 (L)   Aldolase      1.2 - 7.6 U/L 3.2          Assessment:       1. Sjogern's disease.  Uses lubricating drops prn.  Restasis daily.  2. Pulmonary HTN  3. Interstitial lung disease.  Continues on mycophenolate 1,000 mg bid managed by pulmonary Dr. Willson.  Had in January 2021 PFT, walk test and echo with Dr. Willson and will see him today.   4. Joint pain.  Hands, feet and neck.   5. Stress/Depression.  Encourage patient to speak with primary  6. Immunosuppression  7. Former smoker.  3 packs a day for five years.  Quit in 1995  8. Left elbow medial pain.  Suggestive of golfers elbow.  9. Right knee and lower leg pain.  With weather changes  9. Osteoporosis.  Based on L1 compression on x-ray newly developed in 8/2021.     Plan:       1.  Labs (need additional)  2.  Follow with pulmonary for Cellcept.  Will not add Plaquenil since she had all over body rash in past.   3.  Counselor to deal with stress of pandemic and life  4.  Brace for tennis elbow to deal with golfer's elbow.  5.  OTC Voltaren gel for elbow and intermittent knee pain. Ice 2-3x a day.   6.  Continue Tylenol not to exceed 3,000 mg daily  7.  Continue lubricating eye drops three time daily.  Now using Restasis and will discuss with eye doctor [facility].   8.  Calcium 1,000 mg daily and vitamin D 1,000 unit daily  9.  Patient again to to get clearance from dentist for bisphosphonate or denosumab IV.   10.  Refer OT for hand mobility     RTO 4 months/prn

## 2022-05-11 ENCOUNTER — PATIENT MESSAGE (OUTPATIENT)
Dept: RHEUMATOLOGY | Facility: HOSPITAL | Age: 72
End: 2022-05-11
Payer: MEDICARE

## 2022-05-13 ENCOUNTER — PATIENT MESSAGE (OUTPATIENT)
Dept: RHEUMATOLOGY | Facility: CLINIC | Age: 72
End: 2022-05-13
Payer: MEDICARE

## 2022-06-27 ENCOUNTER — CLINICAL SUPPORT (OUTPATIENT)
Dept: REHABILITATION | Facility: HOSPITAL | Age: 72
End: 2022-06-27
Payer: MEDICARE

## 2022-06-27 DIAGNOSIS — M25.60 STIFFNESS DUE TO IMMOBILITY: ICD-10-CM

## 2022-06-27 DIAGNOSIS — M79.641 PAIN IN BOTH HANDS: ICD-10-CM

## 2022-06-27 DIAGNOSIS — M79.642 PAIN IN BOTH HANDS: ICD-10-CM

## 2022-06-27 DIAGNOSIS — Z74.09 STIFFNESS DUE TO IMMOBILITY: ICD-10-CM

## 2022-06-27 DIAGNOSIS — M79.641 BILATERAL HAND PAIN: ICD-10-CM

## 2022-06-27 DIAGNOSIS — M79.642 BILATERAL HAND PAIN: ICD-10-CM

## 2022-06-27 DIAGNOSIS — R53.1 WEAKNESS: ICD-10-CM

## 2022-06-27 PROCEDURE — 97018 PARAFFIN BATH THERAPY: CPT | Mod: PN

## 2022-06-27 PROCEDURE — 97165 OT EVAL LOW COMPLEX 30 MIN: CPT | Mod: PN

## 2022-06-27 NOTE — PATIENT INSTRUCTIONS
"[facility], OCCUPATIONAL THERAPY  HOME EXERCISE PROGRAM     Complete the following massage for 5 minutes, 1x/day:                                                      Complete the following exercises for 2/10 repetitions, 1-2x/day:       AROM: DIP Flexion / Extension  Pinch middle knuckle to prevent bending.   Bend end knuckle until stretch is felt. Hold   3 seconds. Relax. Straighten finger as far as possible.      AROM: PIP Flexion / Extension  Pinch bottom knuckle  to prevent bending.   Actively bend middle knuckle until stretch is felt.   Hold 3 seconds. Relax. Straighten finger as far as possible.        AROM: Isolated MCP Flexion / Extension ("Wave")   Bend only your large, bottom knuckles. Hold 3 seconds.   Keep the tips of your fingers straight. Straighten fingers.      AROM: Isolated IPJ Flexion / Extension ("Hook")   Bend only your middle and end knuckles. Hold 3 seconds.   Straighten your fingers.       AROM: MCP and PIP Flexion / Extension ("Straight Fist")  Bend your bottom and middle knuckles, keeping the tips of your fingers straight.   Try to touch the pads of your fingers on your palm. Hold 3 seconds.   Straighten your fingers.       AROM: Composite Flexion / Extension ("Full Fist")  Bend every joint in your hand into a fist. Hold 3 seconds.   Straighten your fingers.         AROM: Composte Extension ("Finger Lifts")  Lift your finger off of the table one at a time. Hold 3 seconds.   Relax your finger.      AROM: Abduction / Adduction  With hand flat on table, spread all fingers apart,   then bring them together as close as possible.        AROM: Radial Adduction / Abduction  Place your palm flat on the table. Move thumb out   to side. Move back alongside index finger.                                       AROM: Palmar Adduction / Abduction   Rest your small finger on the table. Move thumb   sideways, out and away from   palm. Move back to rest along palm.                    " "    AROM: Opposition   Touch tip of thumb to nail tip of each  finger in turn, making an "O" shape.                                AROM: Composite Flesion ("Pinky Slides")  Touch thumb to tip of small finger. Slide thumb down   small finger into palm.         Joint Protection    Joint Protection (Wringing)    Avoid wringing towels by twisting.  Solution: Loop towel around sink faucet as if braiding and pull gently, or let drip-dry.    Joint Protection (Use Large Joints)    Avoid placing pressure on fingertips.  Solution: Transfer work to other parts of body which are not affected or which have greater strength. Using body weight to push heavy doors open is an example.    Joint Protection (Ulnar Deviation)    Avoid positions that cause fingers to lean sideways toward little finger.  Solution: Use devices like jar-openers to assist in activities.    Joint Protection (Pinch)    Avoid tight pinch, such as when holding a pen.  Solution: Use a thick pen with a felt tip to reduce pressure on fingers.    Joint Protection (Lifting)    Avoid picking up heavy items with one hand.  Solution: Use both hands, and slide item whenever possible.     Joint Protection ()    Avoid: grasping thin utensils for prolonged periods.  Solution: Hold thick-handled tools in dagger fashion when-ever possible for performing tasks such as stirring or scrub-shawn. Relax fingers every 10 minutes during activity.    Joint Protection (Carrying)    Avoid carrying items with weight on fingers.  Solution: Use a shoulder bag or a back pack.  Copyright © I. All rights reserved.   Therapist: Grace AUGUSTE       "

## 2022-06-27 NOTE — PLAN OF CARE
[facility] Occupational Therapy  Initial Evaluation     Date: 6/27/2022  Name: Bharti Woodard  Clinic Number: [id number]    Therapy Diagnosis:   Encounter Diagnoses   Name Primary?    Bilateral hand pain     Pain in both hands     Weakness     Stiffness due to immobility      Physician: Jayne Tompkins*    Physician Orders: OT evaluate and treat  Medical Diagnosis: M79.641,M79.642 (ICD-10-CM) - Bilateral hand pain  Date of Injury/Onset: chronic  Evaluation Date: 6/27/2022  Insurance Authorization Period Expiration: 12/31/2022  Plan of Care Certification Period: 8/26/2022  Date of Return to MD: 9/12/2022    Visit # / Visits authorized: 1 / 12    FOTO: initial eval    Time In:10:15  Time Out: 11:00  Total treatment time: 45minutes  Total Timed [id number] minutes    Precautions:  Standard    Subjective     Involved Side: bilateral  Dominant Side: Right  Date of Onset: chronic  Mechanism of Injury: insidious onset with progressively worsening symptoms of bilateral hand pain  History of Current Condition: pt presents to clinic today with pain and weakness and reports of occasional stiffness in bilateral hands which limits her functionlly  Surgical Procedure: none  Imaging: xrays reveal mild DJD   Previous Therapy: none    Past Medical History/Physical Systems Review:   Bharti Woodard  has a past medical history of Depression, ILD (interstitial lung disease), Pulmonary hypertension, and Sjoegren syndrome.    Bharti Woodard  has a past surgical history that includes Lung biopsy; Cardiac catheterization (Right, 2016); Hysterectomy; Cataract surgery (Bilateral); Esophagogastroduodenoscopy (N/A, 6/15/2021); Esophagogastroduodenoscopy (N/A, 8/3/2021); Colonoscopy (N/A, 8/3/2021); and Esophageal manometry with measurement of impedance (N/A, 8/3/2021).    Bharti has a current medication list which includes the following prescription(s): albuterol sulfate, bupropion, calcium 500, vasculera, furosemide, meclizine,  multivitamin, mycophenolate, oxygen-air delivery systems, restasis, tadalafil, triamcinolone acetonide 0.1%, and tylenol.    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     Current FunctionalStatus   Home/Living environment : lives alone      Limitation of Functional Status as follows:   ADLs/IADLs:     - Feeding: Independent    - Bathing: Independent    - Dressing/Grooming: Independent    - Driving: Independent     Leisure: Independent, sews, cooks      Objective     Edema. Measured in centimeters.   6/27/2022 6/27/2022    Right Left   Wrist Crease 15.5 15.5   Distal Palmar Crease 19 19   MCPs 18.5 18.5     Edema. Measured in centimeters.   6/27/2022 6/27/2022    Right Left   Index:       P1 7 6.9    PIP 6.4 6   P2 5.7 5.7    DIP 5.6 5.6   P3 5 5.4   Long:       P1 6.5 6.6    PIP 6.7 6.1   P2 6 5.8    DIP 5.8 5.6   P3 5.4 5.5     Elbow and Wrist ROM. Measured in degrees.   6/27/2022 6/27/2022    Right Left   Elbow Ext/Flex WNL WNL   Supination/Pronation WNL WNL   Wrist Ext/Flex WNL WNL   Wrist RD/UD WNL WNL     Hand ROM. Bilateral hand range of motion: pt able to make full composite finger flexion and extension     Strength (Dynamometer) and Pinch Strength (Pinch Gauge)  Measured in pounds.   6/27/2022 6/27/2022    Right Left   Rung II 35 45   Key Pinch 13 15   3pt Pinch 10 5   2pt  Pinch 7 3       Manual Muscle Test   6/27/2022 6/27/2022    Right Left   Wrist Extension  5/5 5/5   Wrist Flexion 5/5 5/5   Radial Deviation 5/5 5/5   Ulnar Deviation 5/5 5/5   Supination 5/5 5/5   Pronation 5/5 5/5   Elbow Extension 5/5 5/5   Elbow Flexion 5/5 5/5     CMS Impairment/Limitation/Restriction for FOTO Hand Survey    Therapist reviewed FOTO scores for Bharti Woodard on 6/27/2022.   FOTO documents entered into [employer] - see Media section.    Limitation Score: 35%         Treatment     Treatment Time In: 10:50  Treatment Time Out: 11:00  Total Treatment time separate from Evaluation time:10    Dickinson received the following direct contact modalities after being cleared for contradictions for 10 minutes:   -paraffin to bilateral hands for pain management and tissue extensibility    Home Exercise Program/Education:  Issued HEP (see patient instructions in EMR) and educated on modality use for pain management . Exercises were reviewed and Dickinson was able to demonstrate them prior to the end of the session.   Pt received a written copy of exercises to perform at home. Dickinson demonstrated good  understanding of the education provided.  Pt was advised to perform these exercises free of pain, and to stop performing them if pain occurs.    Patient/Family Education: role of OT, goals for OT, scheduling/cancellations - pt verbalized understanding. Discussed insurance limitations with patient.    Assessment     Bharti Woodard is a 71 y.o. female referred to outpatient occupational therapy and presents with a medical diagnosis of bilateral hand pain, resulting in weakness, stiffness and demonstrates limitations as described in the chart below. Following medical record review it is determined that pt will benefit from occupational therapy services in order to maximize pain free and/or functional use of bilateral hand. The following goals were discussed with the patient and patient is in agreement with them as to be addressed in the  treatment plan. The patient's rehab potential is Good.     Anticipated barriers to occupational therapy: none  Pt has no cultural, educational or language barriers to learning provided.    Profile and History Assessment of Occupational Performance Level of Clinical Decision Making Complexity Score   Occupational Profile:   Bharti Woodard is a 71 y.o. female who lives alone and is currently employed as book keeper. Bharti Woodard has difficulty with  housework/household chores and sewing  affecting his/her daily functional abilities. His/her main goal for therapy is to decrease pain and stiffness.     Comorbidities:   depression and HTN    Medical and Therapy History Review:   Brief               Performance Deficits    Physical:  Joint Mobility  Muscle Power/Strength   Strength  Pinch Strength  Gross Motor Coordination  Pain    Cognitive:  No Deficits    Psychosocial:    No Deficits     Clinical Decision Making:  low    Assessment Process:  Problem-Focused Assessments    Modification/Need for Assistance:  Not Necessary    Intervention Selection:  Several Treatment Options       low  Based on PMHX, co morbidities , data from assessments and functional level of assistance required with task and clinical presentation directly impacting function.         Goals:   The following goals were discussed with the patient and patient is in agreement with them as to be addressed in the treatment plan.     Long Term Goals:  Goals to be met by discharge:  1) Independent with HEP  2) Pt will increase Right  by 5# for improved performance with functional fist for grasp with home management or work related tasks by d/c.   3) Pt will decrease pain to trace or none while completing light home management tasks or work related tasks by d/c.   4) Patient will be able to achieve less than or equal to 15% on the FOTO, demonstrating overall improved functional ability with upper extremity.  (Self-care category)        Plan   Certification  Period/Plan of care expiration: 6/27/2022 to 8/26/2022.    Pt to trial home exercise program on her own at this time and states she will obtain paraffin unit for home use. Should she require skilled services she could benefit from formal Outpatient Occupational Therapy 1 times weekly for 4 weeks to include the following interventions: Paraffin, Fluidotherapy, Manual therapy/joint mobilizations, Modalities for pain management, Therapeutic exercises/activities., Strengthening, Joint Protection and Energy Conservation.      MOLINA Patel

## 2022-09-12 ENCOUNTER — OFFICE VISIT (OUTPATIENT)
Dept: RHEUMATOLOGY | Facility: CLINIC | Age: 72
End: 2022-09-12
Payer: MEDICARE

## 2022-09-12 ENCOUNTER — LAB VISIT (OUTPATIENT)
Dept: LAB | Facility: HOSPITAL | Age: 72
End: 2022-09-12
Attending: INTERNAL MEDICINE
Payer: MEDICARE

## 2022-09-12 VITALS
BODY MASS INDEX: 30.7 KG/M2 | SYSTOLIC BLOOD PRESSURE: 107 MMHG | HEIGHT: 69 IN | HEART RATE: 70 BPM | DIASTOLIC BLOOD PRESSURE: 58 MMHG | WEIGHT: 207.25 LBS

## 2022-09-12 DIAGNOSIS — R53.83 FATIGUE, UNSPECIFIED TYPE: ICD-10-CM

## 2022-09-12 DIAGNOSIS — R06.02 SHORTNESS OF BREATH: ICD-10-CM

## 2022-09-12 DIAGNOSIS — D84.9 IMMUNOSUPPRESSION: ICD-10-CM

## 2022-09-12 DIAGNOSIS — M79.642 BILATERAL HAND PAIN: ICD-10-CM

## 2022-09-12 DIAGNOSIS — M79.641 BILATERAL HAND PAIN: ICD-10-CM

## 2022-09-12 DIAGNOSIS — J84.9 ILD (INTERSTITIAL LUNG DISEASE): ICD-10-CM

## 2022-09-12 DIAGNOSIS — M35.00 SJOGREN'S SYNDROME, WITH UNSPECIFIED ORGAN INVOLVEMENT: Primary | ICD-10-CM

## 2022-09-12 DIAGNOSIS — R70.0 ELEVATED SED RATE: ICD-10-CM

## 2022-09-12 DIAGNOSIS — E66.9 CLASS 1 OBESITY WITH BODY MASS INDEX (BMI) OF 31.0 TO 31.9 IN ADULT, UNSPECIFIED OBESITY TYPE, UNSPECIFIED WHETHER SERIOUS COMORBIDITY PRESENT: ICD-10-CM

## 2022-09-12 DIAGNOSIS — I27.20 PULMONARY HYPERTENSION: ICD-10-CM

## 2022-09-12 DIAGNOSIS — M35.00 SJOGREN'S SYNDROME, WITH UNSPECIFIED ORGAN INVOLVEMENT: ICD-10-CM

## 2022-09-12 LAB
ALBUMIN SERPL BCP-MCNC: 3.8 G/DL (ref 3.5–5.2)
ALP SERPL-CCNC: 67 U/L (ref 55–135)
ALT SERPL W/O P-5'-P-CCNC: 11 U/L (ref 10–44)
ANION GAP SERPL CALC-SCNC: 7 MMOL/L (ref 8–16)
AST SERPL-CCNC: 14 U/L (ref 10–40)
BASOPHILS # BLD AUTO: 0.01 K/UL (ref 0–0.2)
BASOPHILS NFR BLD: 0.2 % (ref 0–1.9)
BILIRUB SERPL-MCNC: 0.3 MG/DL (ref 0.1–1)
BNP SERPL-MCNC: 61 PG/ML (ref 0–99)
BUN SERPL-MCNC: 16 MG/DL (ref 8–23)
C3 SERPL-MCNC: 133 MG/DL (ref 50–180)
C4 SERPL-MCNC: 28 MG/DL (ref 11–44)
CALCIUM SERPL-MCNC: 9.9 MG/DL (ref 8.7–10.5)
CHLORIDE SERPL-SCNC: 99 MMOL/L (ref 95–110)
CK SERPL-CCNC: 54 U/L (ref 20–180)
CO2 SERPL-SCNC: 32 MMOL/L (ref 23–29)
CREAT SERPL-MCNC: 0.8 MG/DL (ref 0.5–1.4)
CRP SERPL-MCNC: 4 MG/L (ref 0–8.2)
DIFFERENTIAL METHOD: ABNORMAL
EOSINOPHIL # BLD AUTO: 0.1 K/UL (ref 0–0.5)
EOSINOPHIL NFR BLD: 2.2 % (ref 0–8)
ERYTHROCYTE [DISTWIDTH] IN BLOOD BY AUTOMATED COUNT: 13 % (ref 11.5–14.5)
ERYTHROCYTE [SEDIMENTATION RATE] IN BLOOD BY PHOTOMETRIC METHOD: 54 MM/HR (ref 0–36)
EST. GFR  (NO RACE VARIABLE): >60 ML/MIN/1.73 M^2
GLUCOSE SERPL-MCNC: 90 MG/DL (ref 70–110)
HCT VFR BLD AUTO: 36.8 % (ref 37–48.5)
HGB BLD-MCNC: 11.5 G/DL (ref 12–16)
IMM GRANULOCYTES # BLD AUTO: 0.02 K/UL (ref 0–0.04)
IMM GRANULOCYTES NFR BLD AUTO: 0.4 % (ref 0–0.5)
LYMPHOCYTES # BLD AUTO: 1.3 K/UL (ref 1–4.8)
LYMPHOCYTES NFR BLD: 25 % (ref 18–48)
MCH RBC QN AUTO: 31.7 PG (ref 27–31)
MCHC RBC AUTO-ENTMCNC: 31.3 G/DL (ref 32–36)
MCV RBC AUTO: 101 FL (ref 82–98)
MONOCYTES # BLD AUTO: 0.5 K/UL (ref 0.3–1)
MONOCYTES NFR BLD: 8.8 % (ref 4–15)
NEUTROPHILS # BLD AUTO: 3.2 K/UL (ref 1.8–7.7)
NEUTROPHILS NFR BLD: 63.4 % (ref 38–73)
NRBC BLD-RTO: 0 /100 WBC
PLATELET # BLD AUTO: 220 K/UL (ref 150–450)
PMV BLD AUTO: 9.1 FL (ref 9.2–12.9)
POTASSIUM SERPL-SCNC: 3.7 MMOL/L (ref 3.5–5.1)
PROT SERPL-MCNC: 6.9 G/DL (ref 6–8.4)
RBC # BLD AUTO: 3.63 M/UL (ref 4–5.4)
SODIUM SERPL-SCNC: 138 MMOL/L (ref 136–145)
TSH SERPL DL<=0.005 MIU/L-ACNC: 1.57 UIU/ML (ref 0.4–4)
WBC # BLD AUTO: 5.11 K/UL (ref 3.9–12.7)

## 2022-09-12 PROCEDURE — 1160F RVW MEDS BY RX/DR IN RCRD: CPT | Mod: CPTII,S$GLB,, | Performed by: INTERNAL MEDICINE

## 2022-09-12 PROCEDURE — 99999 PR PBB SHADOW E&M-EST. PATIENT-LVL III: ICD-10-PCS | Mod: PBBFAC,,, | Performed by: INTERNAL MEDICINE

## 2022-09-12 PROCEDURE — 83880 ASSAY OF NATRIURETIC PEPTIDE: CPT | Performed by: INTERNAL MEDICINE

## 2022-09-12 PROCEDURE — 3008F PR BODY MASS INDEX (BMI) DOCUMENTED: ICD-10-PCS | Mod: CPTII,S$GLB,, | Performed by: INTERNAL MEDICINE

## 2022-09-12 PROCEDURE — 85025 COMPLETE CBC W/AUTO DIFF WBC: CPT | Performed by: INTERNAL MEDICINE

## 2022-09-12 PROCEDURE — 1125F AMNT PAIN NOTED PAIN PRSNT: CPT | Mod: CPTII,S$GLB,, | Performed by: INTERNAL MEDICINE

## 2022-09-12 PROCEDURE — 84443 ASSAY THYROID STIM HORMONE: CPT | Performed by: INTERNAL MEDICINE

## 2022-09-12 PROCEDURE — 85652 RBC SED RATE AUTOMATED: CPT | Performed by: INTERNAL MEDICINE

## 2022-09-12 PROCEDURE — 86160 COMPLEMENT ANTIGEN: CPT | Performed by: INTERNAL MEDICINE

## 2022-09-12 PROCEDURE — 1160F PR REVIEW ALL MEDS BY PRESCRIBER/CLIN PHARMACIST DOCUMENTED: ICD-10-PCS | Mod: CPTII,S$GLB,, | Performed by: INTERNAL MEDICINE

## 2022-09-12 PROCEDURE — 1125F PR PAIN SEVERITY QUANTIFIED, PAIN PRESENT: ICD-10-PCS | Mod: CPTII,S$GLB,, | Performed by: INTERNAL MEDICINE

## 2022-09-12 PROCEDURE — 36415 COLL VENOUS BLD VENIPUNCTURE: CPT | Performed by: INTERNAL MEDICINE

## 2022-09-12 PROCEDURE — 80053 COMPREHEN METABOLIC PANEL: CPT | Performed by: INTERNAL MEDICINE

## 2022-09-12 PROCEDURE — 3008F BODY MASS INDEX DOCD: CPT | Mod: CPTII,S$GLB,, | Performed by: INTERNAL MEDICINE

## 2022-09-12 PROCEDURE — 3078F PR MOST RECENT DIASTOLIC BLOOD PRESSURE < 80 MM HG: ICD-10-PCS | Mod: CPTII,S$GLB,, | Performed by: INTERNAL MEDICINE

## 2022-09-12 PROCEDURE — 3074F PR MOST RECENT SYSTOLIC BLOOD PRESSURE < 130 MM HG: ICD-10-PCS | Mod: CPTII,S$GLB,, | Performed by: INTERNAL MEDICINE

## 2022-09-12 PROCEDURE — 1159F MED LIST DOCD IN RCRD: CPT | Mod: CPTII,S$GLB,, | Performed by: INTERNAL MEDICINE

## 2022-09-12 PROCEDURE — 99999 PR PBB SHADOW E&M-EST. PATIENT-LVL III: CPT | Mod: PBBFAC,,, | Performed by: INTERNAL MEDICINE

## 2022-09-12 PROCEDURE — 86160 COMPLEMENT ANTIGEN: CPT | Mod: 59 | Performed by: INTERNAL MEDICINE

## 2022-09-12 PROCEDURE — 82550 ASSAY OF CK (CPK): CPT | Performed by: INTERNAL MEDICINE

## 2022-09-12 PROCEDURE — 3074F SYST BP LT 130 MM HG: CPT | Mod: CPTII,S$GLB,, | Performed by: INTERNAL MEDICINE

## 2022-09-12 PROCEDURE — 86140 C-REACTIVE PROTEIN: CPT | Performed by: INTERNAL MEDICINE

## 2022-09-12 PROCEDURE — 1159F PR MEDICATION LIST DOCUMENTED IN MEDICAL RECORD: ICD-10-PCS | Mod: CPTII,S$GLB,, | Performed by: INTERNAL MEDICINE

## 2022-09-12 PROCEDURE — 99214 PR OFFICE/OUTPT VISIT, EST, LEVL IV, 30-39 MIN: ICD-10-PCS | Mod: S$GLB,,, | Performed by: INTERNAL MEDICINE

## 2022-09-12 PROCEDURE — 3078F DIAST BP <80 MM HG: CPT | Mod: CPTII,S$GLB,, | Performed by: INTERNAL MEDICINE

## 2022-09-12 PROCEDURE — 86225 DNA ANTIBODY NATIVE: CPT | Performed by: INTERNAL MEDICINE

## 2022-09-12 PROCEDURE — 99214 OFFICE O/P EST MOD 30 MIN: CPT | Mod: S$GLB,,, | Performed by: INTERNAL MEDICINE

## 2022-09-12 NOTE — PROGRESS NOTES
Subjective:       Patient ID: Bharti Woodard is a 71 y.o. female.    Chief Complaint: Sjogrens and ILD    HPI:  Bharti Woodard is a 71 y.o. female with history of Sjogrens diagnosed 50-61 yo. She has difficulty remembering history.   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  Dr. Aldridge at [facility] saw her and diagnosed with lupus and gave prednisone and Plaquenil but she gained 60 pounds.    Saw Dr. Tirado a rheumatologist who diagnosed Sjogrens and sent her a pulmonologist.  Dr. Tirado gave her Mycophenolate.  Pulmonary HTN diagnosed 2010 with Dr. Evans.  Dr. Cassi Nunez rheumatologist at [facility] saw her and was planning to cut down on medication in future.  Patient decided to lower in her own so they felt she was noncompliant.  Patient decided to switch.   Saw Dr. Dunne who did not want to treat her since care needed to be coordinated with rheumatologist.  So she saw Dr. Cabrera and Dr. Herbert.      January 2020 felt very sick.  Tested negative for flu.  She was given antibiotics.  Since then has been fatigued with intermittent joint pains as well as tingling in her feet.     Tylenol 650 mg three times daily helps pain in neck, feet, ankle and hands. Nothing worsens pain.  Pain began after URI treated in January 2020.       Review of chart shows that patient was seen on 2/15/2005 by Dr. Lenora Ackerman in rheumatology for a second opinion.  Per that note patient had persistently elevate sedimentation rate and was diagnosed as PMR by Dr. Aldridge who treated her with prednisone that was later stopped.  Plaquenil was given but caused all over body rash.    Care Everywhere:  1/29/2019 Dr. Herbert note  Biopsy:   Biopsy at  10/2012: diffuse scattered non-necrotizing granulomas (special stains negative) with some areas of fibrosis, but mostly normal lung; scattered lymphoid aggregates    Labs:  November 2014: SSA positive  VENITA 1:1280, positive dsDNA, centromere and Sm/RNP, negative Sm, Scl70, RF, SSB       INTERVAL  "HISTORY:    Had COVID in June 2022 and was treated with Paxlovid.     Less difficulty gripping objects and opening jars.  Did OT one time and was given exercises.  She bought wax [email or web address].  She had her flu vaccine.     Now doing well.  She has been walking on treadmill and doing yoga.  One episode of difficulty with swallowing after drinking water.      Still uses eye drops for lubricating 1-2 times day prn. Using Restasis.  Right knee doing well.  Will see Dr. Wilslon.             Review of Systems   Constitutional:  Positive for fatigue. Negative for fever and unexpected weight change.   HENT:  Negative for mouth sores and trouble swallowing.    Eyes:  Negative for redness.   Respiratory:  Negative for cough and shortness of breath.    Cardiovascular:  Negative for chest pain.   Gastrointestinal:  Negative for constipation and diarrhea.   Endocrine: Negative.    Genitourinary:  Negative for dysuria and genital sores.   Musculoskeletal:  Negative for arthralgias.   Skin:  Negative for rash.   Neurological:  Negative for headaches.   Hematological:  Does not bruise/bleed easily.       Objective:   BP (!) 107/58   Pulse 70   Ht 5' 9" (1.753 m)   Wt 94 kg (207 lb 3.7 oz)   LMP 01/01/1976   BMI 30.60 kg/m²      Physical Exam   Constitutional: She is oriented to person, place, and time.   HENT:   Head: Normocephalic and atraumatic.   Eyes: Conjunctivae are normal.   Cardiovascular: Normal rate, regular rhythm and normal heart sounds.   Pulmonary/Chest: Effort normal and breath sounds normal.   Abdominal: Soft. Bowel sounds are normal.   Musculoskeletal:         General: Tenderness present. No deformity.      Cervical back: Neck supple.      Comments: Multiple Heberdens nodes  No pain on compression of MTPs bilateral   Neurological: She is alert and oriented to person, place, and time.   Skin: Skin is warm and dry.   Psychiatric: Mood and affect normal.     LABS    Component      Latest Ref Rng & Units 5/9/2022   WBC   "    3.90 - 12.70 K/uL 4.94   RBC      4.00 - 5.40 M/uL 3.93 (L)   Hemoglobin      12.0 - 16.0 g/dL 12.2   Hematocrit      37.0 - 48.5 % 39.0   MCV      82 - 98 fL 99 (H)   MCH      27.0 - 31.0 pg 31.0   MCHC      32.0 - 36.0 g/dL 31.3 (L)   RDW      11.5 - 14.5 % 12.3   Platelets      150 - 450 K/uL 247   MPV      9.2 - 12.9 fL 9.0 (L)   Immature Granulocytes      0.0 - 0.5 % 0.6 (H)   Gran # (ANC)      1.8 - 7.7 K/uL 3.2   Immature Grans (Abs)      0.00 - 0.04 K/uL 0.03   Lymph #      1.0 - 4.8 K/uL 1.2   Mono #      0.3 - 1.0 K/uL 0.5   Eos #      0.0 - 0.5 K/uL 0.1   Baso #      0.00 - 0.20 K/uL 0.02   nRBC      0 /100 WBC 0   Gran %      38.0 - 73.0 % 64.2   Lymph %      18.0 - 48.0 % 23.9   Mono %      4.0 - 15.0 % 9.5   Eosinophil %      0.0 - 8.0 % 1.4   Basophil %      0.0 - 1.9 % 0.4   Differential Method       Automated   Sodium      136 - 145 mmol/L 140   Potassium      3.5 - 5.1 mmol/L 4.3   Chloride      95 - 110 mmol/L 99   CO2      23 - 29 mmol/L 32 (H)   Glucose      70 - 110 mg/dL 94   BUN      8 - 23 mg/dL 17   Creatinine      0.5 - 1.4 mg/dL 0.8   Calcium      8.7 - 10.5 mg/dL 9.6   PROTEIN TOTAL      6.0 - 8.4 g/dL 6.6   Albumin      3.5 - 5.2 g/dL 3.9   BILIRUBIN TOTAL      0.1 - 1.0 mg/dL 0.4   Alkaline Phosphatase      55 - 135 U/L 89   AST      10 - 40 U/L 14   ALT      10 - 44 U/L <5 (L)   Anion Gap      8 - 16 mmol/L 9   eGFR if African American      >60 mL/min/1.73 m:2 >60.0   eGFR if non African American      >60 mL/min/1.73 m:2 >60.0   Specimen UA       Urine, Unspecified   Color, UA      Yellow, Straw, Alisa Straw   Appearance, UA      Clear Hazy (A)   pH, UA      5.0 - 8.0 6.0   Specific Gravity, UA      1.005 - 1.030 1.005   Protein, UA      Negative Negative   Glucose, UA      Negative Negative   Ketones, UA      Negative Negative   Bilirubin (UA)      Negative Negative   Occult Blood UA      Negative Negative   NITRITE UA      Negative Negative   Leukocytes, UA      Negative Negative    Protein, Urine Random      0 - 15 mg/dL <7   Creatinine, Urine      15.0 - 325.0 mg/dL 22.0   Prot/Creat Ratio, Urine      0.00 - 0.20 Unable to calculate   ds DNA Ab      Negative 1:10 Negative 1:10   Complement (C-3)      50 - 180 mg/dL 132   Complement (C-4)      11 - 44 mg/dL 33          Assessment:       1. Sjogern's disease.  Uses lubricating drops prn.  Restasis daily.  2. Pulmonary HTN.  Tadalafil with Dr. Willson  3. Interstitial lung disease.  Continues on mycophenolate 1,000 mg bid managed by pulmonary Dr. Willson.  Uses O2 with activity and sleep at 2-3l.  Had in January 2021 PFT, walk test and echo with Dr. Willson and will see him today.   4. Joint pain.  Hands, feet and neck.   5. Stress/Depression.  Encourage patient to speak with primary  6. Immunosuppression  7. Former smoker.  3 packs a day for five years.  Quit in 1995  8. Left elbow medial pain.  Suggestive of golfers elbow.  9. Right knee and lower leg pain.  With weather changes  10. Osteoporosis.  Based on L1 compression on x-ray newly developed in 8/2021.   11. Swallowing issue.  Evaluated by GI Dr. Dedra Tolliver who wanted to dilate her but patient decided to wait    Plan:       1.  Labs   2.  Follow with pulmonary for Cellcept.  Will not add Plaquenil since she had all over body rash in past.   3.  Counselor to deal with stress of pandemic and life  4.  Brace for tennis elbow to deal with golfer's elbow.  5.  OTC Voltaren gel for elbow and intermittent knee pain. Ice 2-3x a day.   6.  Continue Tylenol not to exceed 3,000 mg daily  7.  Continue lubricating eye drops three time daily.  Now using Restasis and will discuss with eye doctor [facility].   8.  Calcium 1,000 mg daily and vitamin D 1,000 unit daily  9.  Patient dentist objected to her using bisphosphonate or denosumab IV.  Patient needs 2 teeth pulled and 1 crown placed.        RTO 4 months/prn

## 2022-09-12 NOTE — PROGRESS NOTES
Rapid3 Question Responses and Scores 9/11/2022   MDHAQ Score 0.3   Psychologic Score 3.3   Pain Score 3   When you awakened in the morning OVER THE LAST WEEK, did you feel stiff? Yes   If Yes, please indicate the number of hours until you are as limber as you will be for the day 1   Fatigue Score 4   Global Health Score 3   RAPID3 Score 2.33     Answers submitted by the patient for this visit:  Rheumatology Questionnaire (Submitted on 9/11/2022)  fever: No  eye redness: No  mouth sores: No  headaches: No  shortness of breath: Yes  chest pain: No  trouble swallowing: No  diarrhea: No  constipation: No  unexpected weight change: No  genital sore: No  dysuria: No  During the last 3 days, have you had a skin rash?: No  Bruises or bleeds easily: No  cough: No

## 2022-09-13 ENCOUNTER — PATIENT MESSAGE (OUTPATIENT)
Dept: RHEUMATOLOGY | Facility: CLINIC | Age: 72
End: 2022-09-13
Payer: MEDICARE

## 2022-09-13 LAB — DSDNA AB SER-ACNC: NORMAL [IU]/ML

## 2022-10-24 ENCOUNTER — LAB VISIT (OUTPATIENT)
Dept: LAB | Facility: HOSPITAL | Age: 72
End: 2022-10-24
Attending: INTERNAL MEDICINE
Payer: MEDICARE

## 2022-10-24 DIAGNOSIS — D53.9 ANEMIA, MACROCYTIC: ICD-10-CM

## 2022-10-24 PROBLEM — G61.9 INFLAMMATORY NEUROPATHY: Status: ACTIVE | Noted: 2022-10-24

## 2022-10-24 PROBLEM — I73.9 PVD (PERIPHERAL VASCULAR DISEASE): Status: ACTIVE | Noted: 2022-10-24

## 2022-10-24 PROCEDURE — 82746 ASSAY OF FOLIC ACID SERUM: CPT | Performed by: INTERNAL MEDICINE

## 2022-10-24 PROCEDURE — 82607 VITAMIN B-12: CPT | Performed by: INTERNAL MEDICINE

## 2022-10-24 PROCEDURE — 36415 COLL VENOUS BLD VENIPUNCTURE: CPT | Performed by: INTERNAL MEDICINE

## 2022-10-25 LAB
FOLATE SERPL-MCNC: >40 NG/ML (ref 4–24)
VIT B12 SERPL-MCNC: 571 PG/ML (ref 210–950)

## 2022-12-16 ENCOUNTER — OFFICE VISIT (OUTPATIENT)
Dept: URGENT CARE | Facility: CLINIC | Age: 72
End: 2022-12-16
Payer: MEDICARE

## 2022-12-16 VITALS
TEMPERATURE: 99 F | SYSTOLIC BLOOD PRESSURE: 131 MMHG | DIASTOLIC BLOOD PRESSURE: 72 MMHG | BODY MASS INDEX: 30.21 KG/M2 | WEIGHT: 204 LBS | HEIGHT: 69 IN | OXYGEN SATURATION: 95 % | RESPIRATION RATE: 16 BRPM | HEART RATE: 70 BPM

## 2022-12-16 DIAGNOSIS — J06.9 URI WITH COUGH AND CONGESTION: Primary | ICD-10-CM

## 2022-12-16 LAB
CTP QC/QA: YES
CTP QC/QA: YES
POC MOLECULAR INFLUENZA A AGN: NEGATIVE
POC MOLECULAR INFLUENZA B AGN: NEGATIVE
SARS-COV-2 AG RESP QL IA.RAPID: NEGATIVE

## 2022-12-16 PROCEDURE — 87502 INFLUENZA DNA AMP PROBE: CPT | Mod: QW,S$GLB,, | Performed by: NURSE PRACTITIONER

## 2022-12-16 PROCEDURE — 99213 PR OFFICE/OUTPT VISIT, EST, LEVL III, 20-29 MIN: ICD-10-PCS | Mod: S$GLB,,, | Performed by: NURSE PRACTITIONER

## 2022-12-16 PROCEDURE — 3078F DIAST BP <80 MM HG: CPT | Mod: CPTII,S$GLB,, | Performed by: NURSE PRACTITIONER

## 2022-12-16 PROCEDURE — 1160F RVW MEDS BY RX/DR IN RCRD: CPT | Mod: CPTII,S$GLB,, | Performed by: NURSE PRACTITIONER

## 2022-12-16 PROCEDURE — 87811 SARS-COV-2 COVID19 W/OPTIC: CPT | Mod: QW,S$GLB,, | Performed by: NURSE PRACTITIONER

## 2022-12-16 PROCEDURE — 1159F MED LIST DOCD IN RCRD: CPT | Mod: CPTII,S$GLB,, | Performed by: NURSE PRACTITIONER

## 2022-12-16 PROCEDURE — 3078F PR MOST RECENT DIASTOLIC BLOOD PRESSURE < 80 MM HG: ICD-10-PCS | Mod: CPTII,S$GLB,, | Performed by: NURSE PRACTITIONER

## 2022-12-16 PROCEDURE — 3075F SYST BP GE 130 - 139MM HG: CPT | Mod: CPTII,S$GLB,, | Performed by: NURSE PRACTITIONER

## 2022-12-16 PROCEDURE — 87502 POCT INFLUENZA A/B MOLECULAR: ICD-10-PCS | Mod: QW,S$GLB,, | Performed by: NURSE PRACTITIONER

## 2022-12-16 PROCEDURE — 1159F PR MEDICATION LIST DOCUMENTED IN MEDICAL RECORD: ICD-10-PCS | Mod: CPTII,S$GLB,, | Performed by: NURSE PRACTITIONER

## 2022-12-16 PROCEDURE — 3008F PR BODY MASS INDEX (BMI) DOCUMENTED: ICD-10-PCS | Mod: CPTII,S$GLB,, | Performed by: NURSE PRACTITIONER

## 2022-12-16 PROCEDURE — 3075F PR MOST RECENT SYSTOLIC BLOOD PRESS GE 130-139MM HG: ICD-10-PCS | Mod: CPTII,S$GLB,, | Performed by: NURSE PRACTITIONER

## 2022-12-16 PROCEDURE — 3008F BODY MASS INDEX DOCD: CPT | Mod: CPTII,S$GLB,, | Performed by: NURSE PRACTITIONER

## 2022-12-16 PROCEDURE — 99213 OFFICE O/P EST LOW 20 MIN: CPT | Mod: S$GLB,,, | Performed by: NURSE PRACTITIONER

## 2022-12-16 PROCEDURE — 1160F PR REVIEW ALL MEDS BY PRESCRIBER/CLIN PHARMACIST DOCUMENTED: ICD-10-PCS | Mod: CPTII,S$GLB,, | Performed by: NURSE PRACTITIONER

## 2022-12-16 PROCEDURE — 87811 SARS CORONAVIRUS 2 ANTIGEN POCT, MANUAL READ: ICD-10-PCS | Mod: QW,S$GLB,, | Performed by: NURSE PRACTITIONER

## 2022-12-16 RX ORDER — BENZONATATE 100 MG/1
100 CAPSULE ORAL 3 TIMES DAILY PRN
Qty: 30 CAPSULE | Refills: 0 | Status: SHIPPED | OUTPATIENT
Start: 2022-12-16 | End: 2022-12-26

## 2022-12-16 NOTE — PROGRESS NOTES
"Subjective:       Patient ID: Bharti Woodard is a 72 y.o. female.    Vitals:  height is 5' 9" (1.753 m) and weight is 92.5 kg (204 lb). Her temperature is 98.6 °F (37 °C). Her blood pressure is 131/72 and her pulse is 70. Her respiration is 16 and oxygen saturation is 95%.     Chief Complaint: Cough    This is a 72 y.o. female who presents today with a chief complaint of cough, congestion x Tuesday, patient reports she followed up with her primary on Monday and was notified that her lungs sound "really good" per primary, patient with history of interstitial lung disease, uses oxygen at home at night, denies any sputum production with cough, patient reports nasal congestion, just started taking over-the-counter allergy medicine yesterday,    Cough  This is a new problem. The problem has been gradually worsening. The cough is Productive of sputum. Associated symptoms include nasal congestion and postnasal drip. Pertinent negatives include no headaches. Treatments tried: zicam and vitamin c.     HENT:  Positive for congestion and postnasal drip.    Respiratory:  Positive for cough.    Neurological:  Negative for headaches.     Past Medical History:   Diagnosis Date    Depression     ILD (interstitial lung disease)     Pulmonary hypertension     Sjoegren syndrome        Objective:      Physical Exam   Constitutional: She is oriented to person, place, and time. She appears well-developed. She is cooperative.  Non-toxic appearance. She does not appear ill. No distress.   HENT:   Head: Normocephalic and atraumatic.   Ears:   Right Ear: Hearing, tympanic membrane, external ear and ear canal normal.   Left Ear: Hearing, tympanic membrane, external ear and ear canal normal.   Nose: Nose normal. No mucosal edema, rhinorrhea or nasal deformity. No epistaxis. Right sinus exhibits no maxillary sinus tenderness and no frontal sinus tenderness. Left sinus exhibits no maxillary sinus tenderness and no frontal sinus tenderness. "   Mouth/Throat: Uvula is midline, oropharynx is clear and moist and mucous membranes are normal. No trismus in the jaw. Normal dentition. No uvula swelling. No oropharyngeal exudate, posterior oropharyngeal edema or posterior oropharyngeal erythema.   Eyes: Conjunctivae and lids are normal. No scleral icterus. Extraocular movement intact   Neck: Trachea normal and phonation normal. Neck supple. No edema present. No erythema present. No neck rigidity present.   Cardiovascular: Normal rate, regular rhythm, normal heart sounds and normal pulses.   Pulmonary/Chest: Effort normal and breath sounds normal. No stridor. No respiratory distress. She has no decreased breath sounds. She has no wheezes.   Abdominal: Normal appearance.   Musculoskeletal: Normal range of motion.         General: No deformity. Normal range of motion.   Neurological: no focal deficit. She is alert and oriented to person, place, and time. She exhibits normal muscle tone. Coordination normal.   Skin: Skin is warm, dry, intact, not diaphoretic and not pale.   Psychiatric: Her speech is normal and behavior is normal. Judgment and thought content normal.   Nursing note and vitals reviewed.      Results for orders placed or performed in visit on 12/16/22   POCT Influenza A/B MOLECULAR   Result Value Ref Range    POC Molecular Influenza A Ag Negative Negative, Not Reported    POC Molecular Influenza B Ag Negative Negative, Not Reported     Acceptable Yes    SARS Coronavirus 2 Antigen, POCT Manual Read   Result Value Ref Range    SARS Coronavirus 2 Antigen Negative Negative     Acceptable Yes          Patient in no acute distress.  Vitals reassuring.  Discussed results/diagnosis/plan in depth with patient in clinic. Strict precautions given to patient to monitor for worsening signs and symptoms. Advised to follow up with primary.All questions answered. Strict ER precautions given. If your symptoms worsens or fail to improve  you should go to the Emergency Room. Discharge and follow-up instructions given verbally/printed. Discharge and follow-up instructions discussed with the patient who expressed understanding and willingness to comply with my recommendations.Patient voiced understanding and in agreement with current treatment plan.     Please be advised this text was dictated with [employer] software and may contain errors due to translation.    Assessment:       1. URI with cough and congestion          Plan:         URI with cough and congestion  -     POCT Influenza A/B MOLECULAR  -     SARS Coronavirus 2 Antigen, POCT Manual Read    Other orders  -     benzonatate (TESSALON) 100 MG capsule; Take 1 capsule (100 mg total) by mouth 3 (three) times daily as needed for Cough.  Dispense: 30 capsule; Refill: 0           Medical Decision Making:   Clinical Tests:   Lab Tests: Reviewed  Urgent Care Management:  Patient in no acute distress.  Vitals reassuring.  On exam, patient is nontoxic appearing and afebrile.  Lungs CTA.  Negative COVID-19 and flu test results discussed with patient detail.  Detailed education provided about COVID 19 precautions and recommendations as per CDC guidelines.  Medication prescribed and over-the-counter medications discussed with patient in detail.  I strongly recommended patient to follow-up with primary if no improvement symptoms.Patient voiced understanding and in agreement with current treatment plan.           Patient Instructions   PLEASE READ YOUR DISCHARGE INSTRUCTIONS ENTIRELY AS IT CONTAINS IMPORTANT INFORMATION.      Please drink plenty of fluids.    Please get plenty of rest.    Please return here or go to the Emergency Department for any concerns or worsening of condition.    Please take an over the counter antihistamine medication (allegra/Claritin/Zyrtec) of your choice as directed.    Try an over the counter decongestant like Mucinex D or Sudafed. You buy this behind the pharmacy counter    If  you do have Hypertension or palpitations, it is safe to take Coricidin HBP for relief of sinus symptoms.    If not allergic, please take over the counter Tylenol (Acetaminophen) and/or Motrin (Ibuprofen) as directed for control of pain and/or fever.  Please follow up with your primary care doctor or specialist as needed.    Sore throat recommendations: Warm fluids, warm salt water gargles, throat lozenges, tea, honey, soup, rest, hydration.    Use over the counter flonase: one spray each nostril twice daily OR two sprays each nostril once daily.     If you  smoke, please stop smoking.      Please return or see your primary care doctor if you develop new or worsening symptoms.     Please arrange follow up with your primary medical clinic as soon as possible. You must understand that you've received an Urgent Care treatment only and that you may be released before all of your medical problems are known or treated. You, the patient, will arrange for follow up as instructed. If your symptoms worsen or fail to improve you should go to the Emergency Room.

## 2023-02-03 ENCOUNTER — PATIENT MESSAGE (OUTPATIENT)
Dept: OBSTETRICS AND GYNECOLOGY | Facility: CLINIC | Age: 73
End: 2023-02-03
Payer: MEDICARE

## 2023-02-03 DIAGNOSIS — Z12.31 ENCOUNTER FOR SCREENING MAMMOGRAM FOR MALIGNANT NEOPLASM OF BREAST: Primary | ICD-10-CM

## 2023-02-06 ENCOUNTER — LAB VISIT (OUTPATIENT)
Dept: LAB | Facility: HOSPITAL | Age: 73
End: 2023-02-06
Attending: INTERNAL MEDICINE
Payer: MEDICARE

## 2023-02-06 ENCOUNTER — PATIENT MESSAGE (OUTPATIENT)
Dept: OBSTETRICS AND GYNECOLOGY | Facility: CLINIC | Age: 73
End: 2023-02-06
Payer: MEDICARE

## 2023-02-06 ENCOUNTER — OFFICE VISIT (OUTPATIENT)
Dept: RHEUMATOLOGY | Facility: CLINIC | Age: 73
End: 2023-02-06
Payer: MEDICARE

## 2023-02-06 VITALS
WEIGHT: 209.88 LBS | HEART RATE: 68 BPM | SYSTOLIC BLOOD PRESSURE: 112 MMHG | DIASTOLIC BLOOD PRESSURE: 65 MMHG | BODY MASS INDEX: 30.99 KG/M2

## 2023-02-06 DIAGNOSIS — D84.9 IMMUNOSUPPRESSION: ICD-10-CM

## 2023-02-06 DIAGNOSIS — R53.83 FATIGUE, UNSPECIFIED TYPE: ICD-10-CM

## 2023-02-06 DIAGNOSIS — M35.00 SJOGREN'S SYNDROME, WITH UNSPECIFIED ORGAN INVOLVEMENT: Primary | ICD-10-CM

## 2023-02-06 DIAGNOSIS — M35.00 SJOGREN'S SYNDROME, WITH UNSPECIFIED ORGAN INVOLVEMENT: ICD-10-CM

## 2023-02-06 DIAGNOSIS — J84.9 ILD (INTERSTITIAL LUNG DISEASE): ICD-10-CM

## 2023-02-06 DIAGNOSIS — I27.20 PULMONARY HYPERTENSION: ICD-10-CM

## 2023-02-06 LAB
ALBUMIN SERPL BCP-MCNC: 3.7 G/DL (ref 3.5–5.2)
ALP SERPL-CCNC: 81 U/L (ref 55–135)
ALT SERPL W/O P-5'-P-CCNC: 9 U/L (ref 10–44)
ANION GAP SERPL CALC-SCNC: 12 MMOL/L (ref 8–16)
AST SERPL-CCNC: 13 U/L (ref 10–40)
BASOPHILS # BLD AUTO: 0.01 K/UL (ref 0–0.2)
BASOPHILS NFR BLD: 0.2 % (ref 0–1.9)
BILIRUB SERPL-MCNC: 0.4 MG/DL (ref 0.1–1)
BUN SERPL-MCNC: 13 MG/DL (ref 8–23)
C3 SERPL-MCNC: 126 MG/DL (ref 50–180)
C4 SERPL-MCNC: 30 MG/DL (ref 11–44)
CALCIUM SERPL-MCNC: 9.8 MG/DL (ref 8.7–10.5)
CHLORIDE SERPL-SCNC: 98 MMOL/L (ref 95–110)
CK SERPL-CCNC: 40 U/L (ref 20–180)
CO2 SERPL-SCNC: 29 MMOL/L (ref 23–29)
CREAT SERPL-MCNC: 0.8 MG/DL (ref 0.5–1.4)
CRP SERPL-MCNC: 6.3 MG/L (ref 0–8.2)
DIFFERENTIAL METHOD: ABNORMAL
EOSINOPHIL # BLD AUTO: 0.1 K/UL (ref 0–0.5)
EOSINOPHIL NFR BLD: 1.7 % (ref 0–8)
ERYTHROCYTE [DISTWIDTH] IN BLOOD BY AUTOMATED COUNT: 12.7 % (ref 11.5–14.5)
ERYTHROCYTE [SEDIMENTATION RATE] IN BLOOD BY PHOTOMETRIC METHOD: 44 MM/HR (ref 0–36)
EST. GFR  (NO RACE VARIABLE): >60 ML/MIN/1.73 M^2
GLUCOSE SERPL-MCNC: 91 MG/DL (ref 70–110)
HCT VFR BLD AUTO: 39.9 % (ref 37–48.5)
HGB BLD-MCNC: 12.3 G/DL (ref 12–16)
IMM GRANULOCYTES # BLD AUTO: 0.02 K/UL (ref 0–0.04)
IMM GRANULOCYTES NFR BLD AUTO: 0.4 % (ref 0–0.5)
LYMPHOCYTES # BLD AUTO: 1.2 K/UL (ref 1–4.8)
LYMPHOCYTES NFR BLD: 25.4 % (ref 18–48)
MCH RBC QN AUTO: 31.4 PG (ref 27–31)
MCHC RBC AUTO-ENTMCNC: 30.8 G/DL (ref 32–36)
MCV RBC AUTO: 102 FL (ref 82–98)
MONOCYTES # BLD AUTO: 0.4 K/UL (ref 0.3–1)
MONOCYTES NFR BLD: 8.5 % (ref 4–15)
NEUTROPHILS # BLD AUTO: 2.9 K/UL (ref 1.8–7.7)
NEUTROPHILS NFR BLD: 63.8 % (ref 38–73)
NRBC BLD-RTO: 0 /100 WBC
PLATELET # BLD AUTO: 250 K/UL (ref 150–450)
PMV BLD AUTO: 9.1 FL (ref 9.2–12.9)
POTASSIUM SERPL-SCNC: 4.1 MMOL/L (ref 3.5–5.1)
PROT SERPL-MCNC: 7.1 G/DL (ref 6–8.4)
RBC # BLD AUTO: 3.92 M/UL (ref 4–5.4)
SODIUM SERPL-SCNC: 139 MMOL/L (ref 136–145)
WBC # BLD AUTO: 4.61 K/UL (ref 3.9–12.7)

## 2023-02-06 PROCEDURE — 99214 PR OFFICE/OUTPT VISIT, EST, LEVL IV, 30-39 MIN: ICD-10-PCS | Mod: S$GLB,,, | Performed by: INTERNAL MEDICINE

## 2023-02-06 PROCEDURE — 36415 COLL VENOUS BLD VENIPUNCTURE: CPT | Performed by: INTERNAL MEDICINE

## 2023-02-06 PROCEDURE — 1160F RVW MEDS BY RX/DR IN RCRD: CPT | Mod: CPTII,S$GLB,, | Performed by: INTERNAL MEDICINE

## 2023-02-06 PROCEDURE — 1160F PR REVIEW ALL MEDS BY PRESCRIBER/CLIN PHARMACIST DOCUMENTED: ICD-10-PCS | Mod: CPTII,S$GLB,, | Performed by: INTERNAL MEDICINE

## 2023-02-06 PROCEDURE — 3074F PR MOST RECENT SYSTOLIC BLOOD PRESSURE < 130 MM HG: ICD-10-PCS | Mod: CPTII,S$GLB,, | Performed by: INTERNAL MEDICINE

## 2023-02-06 PROCEDURE — 3078F PR MOST RECENT DIASTOLIC BLOOD PRESSURE < 80 MM HG: ICD-10-PCS | Mod: CPTII,S$GLB,, | Performed by: INTERNAL MEDICINE

## 2023-02-06 PROCEDURE — 99999 PR PBB SHADOW E&M-EST. PATIENT-LVL III: CPT | Mod: PBBFAC,,, | Performed by: INTERNAL MEDICINE

## 2023-02-06 PROCEDURE — 1159F MED LIST DOCD IN RCRD: CPT | Mod: CPTII,S$GLB,, | Performed by: INTERNAL MEDICINE

## 2023-02-06 PROCEDURE — 3074F SYST BP LT 130 MM HG: CPT | Mod: CPTII,S$GLB,, | Performed by: INTERNAL MEDICINE

## 2023-02-06 PROCEDURE — 86160 COMPLEMENT ANTIGEN: CPT | Mod: 59 | Performed by: INTERNAL MEDICINE

## 2023-02-06 PROCEDURE — 3078F DIAST BP <80 MM HG: CPT | Mod: CPTII,S$GLB,, | Performed by: INTERNAL MEDICINE

## 2023-02-06 PROCEDURE — 86225 DNA ANTIBODY NATIVE: CPT | Performed by: INTERNAL MEDICINE

## 2023-02-06 PROCEDURE — 99214 OFFICE O/P EST MOD 30 MIN: CPT | Mod: S$GLB,,, | Performed by: INTERNAL MEDICINE

## 2023-02-06 PROCEDURE — 86160 COMPLEMENT ANTIGEN: CPT | Performed by: INTERNAL MEDICINE

## 2023-02-06 PROCEDURE — 1159F PR MEDICATION LIST DOCUMENTED IN MEDICAL RECORD: ICD-10-PCS | Mod: CPTII,S$GLB,, | Performed by: INTERNAL MEDICINE

## 2023-02-06 PROCEDURE — 85025 COMPLETE CBC W/AUTO DIFF WBC: CPT | Performed by: INTERNAL MEDICINE

## 2023-02-06 PROCEDURE — 85652 RBC SED RATE AUTOMATED: CPT | Performed by: INTERNAL MEDICINE

## 2023-02-06 PROCEDURE — 1125F AMNT PAIN NOTED PAIN PRSNT: CPT | Mod: CPTII,S$GLB,, | Performed by: INTERNAL MEDICINE

## 2023-02-06 PROCEDURE — 80053 COMPREHEN METABOLIC PANEL: CPT | Performed by: INTERNAL MEDICINE

## 2023-02-06 PROCEDURE — 82550 ASSAY OF CK (CPK): CPT | Performed by: INTERNAL MEDICINE

## 2023-02-06 PROCEDURE — 1125F PR PAIN SEVERITY QUANTIFIED, PAIN PRESENT: ICD-10-PCS | Mod: CPTII,S$GLB,, | Performed by: INTERNAL MEDICINE

## 2023-02-06 PROCEDURE — 99999 PR PBB SHADOW E&M-EST. PATIENT-LVL III: ICD-10-PCS | Mod: PBBFAC,,, | Performed by: INTERNAL MEDICINE

## 2023-02-06 PROCEDURE — 3008F BODY MASS INDEX DOCD: CPT | Mod: CPTII,S$GLB,, | Performed by: INTERNAL MEDICINE

## 2023-02-06 PROCEDURE — 3008F PR BODY MASS INDEX (BMI) DOCUMENTED: ICD-10-PCS | Mod: CPTII,S$GLB,, | Performed by: INTERNAL MEDICINE

## 2023-02-06 PROCEDURE — 86140 C-REACTIVE PROTEIN: CPT | Performed by: INTERNAL MEDICINE

## 2023-02-06 RX ORDER — VITAMIN B COMPLEX
1 CAPSULE ORAL DAILY
COMMUNITY

## 2023-02-06 RX ORDER — POLYETHYLENE GLYCOL 400 AND PROPYLENE GLYCOL 4; 3 MG/ML; MG/ML
SOLUTION/ DROPS OPHTHALMIC
COMMUNITY
Start: 2022-12-27

## 2023-02-06 ASSESSMENT — ROUTINE ASSESSMENT OF PATIENT INDEX DATA (RAPID3)
FATIGUE SCORE: 4
WHEN YOU AWAKENED IN THE MORNING OVER THE LAST WEEK, PLEASE INDICATE THE AMOUNT OF TIME IT TAKES UNTIL YOU ARE AS LIMBER AS YOU WILL BE FOR THE DAY: 5 MINUTES
TOTAL RAPID3 SCORE: 1.17
PATIENT GLOBAL ASSESSMENT SCORE: 1.5
PSYCHOLOGICAL DISTRESS SCORE: 3.3
PAIN SCORE: 1
MDHAQ FUNCTION SCORE: 0.3

## 2023-02-06 NOTE — PROGRESS NOTES
Subjective:       Patient ID: Bharti Woodard is a 72 y.o. female.    Chief Complaint: Sjogrens and ILD    HPI:  Bharti Woodard is a 72 y.o. female with history of Sjogrens diagnosed 50-61 yo. She has difficulty remembering history.   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  Dr. Aldridge at [facility] saw her and diagnosed with lupus and gave prednisone and Plaquenil but she gained 60 pounds.    Saw Dr. Tirado a rheumatologist who diagnosed Sjogrens and sent her a pulmonologist.  Dr. Tirado gave her Mycophenolate.  Pulmonary HTN diagnosed 2010 with Dr. Evans.  [place]. Cassi Nunez [place] [facility] [place] and was planning to cut down on medication in future.  Patient decided to lower in her own so they felt she was noncompliant.  Patient decided to switch.   Saw Dr. Dunne who did not want to treat her since care needed to be coordinated with rheumatologist.  So she saw Dr. Cabrera and Dr. Herbert.      January 2020 felt very sick.  Tested negative for flu.  She was given antibiotics.  Since then has been fatigued with intermittent joint pains as well as tingling in her feet.     Tylenol 650 mg three times daily helps pain in neck, feet, ankle and hands. Nothing worsens pain.  Pain began after URI treated in January 2020.       Review of chart shows that patient was seen on 2/15/2005 by Dr. Lenora Ackerman in rheumatology for a second opinion.  Per that note patient had persistently elevate sedimentation rate and was diagnosed as PMR by Dr. Aldridge who treated her with prednisone that was later stopped.  Plaquenil was given but caused all over body rash.    Care Everywhere:  1/29/2019 Dr. Herbert note  Biopsy:   Biopsy at  10/2012: diffuse scattered non-necrotizing granulomas (special stains negative) with some areas of fibrosis, but mostly normal lung; scattered lymphoid aggregates    Labs:  November 2014: SSA positive  VENITA 1:1280, positive dsDNA, centromere and Sm/RNP, negative Sm, Scl70, RF, SSB       INTERVAL  HISTORY:    Had COVID in June 2022 and was treated with Paxlovid.     Now doing well.  Intermittent flares of hand pain.  Exercises from OT  She has been walking on treadmill and doing yoga.  One episode of difficulty with swallowing after drinking water.      Still uses eye drops for lubricating 1-2 times day prn. Using Restasis.  Right knee doing well.  Will see Dr. Willson in June.  Last visit he told her to go back to the gym to help with SOB and fatigue.  She has been using CPAP again since she got it back from recall.  Works 2-3 days a week as a .              Review of Systems   Constitutional:  Positive for fatigue. Negative for fever and unexpected weight change.   HENT:  Negative for mouth sores and trouble swallowing.    Eyes:  Negative for redness.   Respiratory:  Negative for cough and shortness of breath.    Cardiovascular:  Negative for chest pain.   Gastrointestinal:  Negative for constipation and diarrhea.   Endocrine: Negative.    Genitourinary:  Negative for dysuria and genital sores.   Musculoskeletal:  Negative for arthralgias.   Skin:  Negative for rash.   Neurological:  Negative for headaches.   Hematological:  Does not bruise/bleed easily.       Objective:   /65   Pulse 68   Wt 95.2 kg (209 lb 14.1 oz)   LMP 01/01/1976   BMI 30.99 kg/m²      Physical Exam   Constitutional: She is oriented to person, place, and time.   HENT:   Head: Normocephalic and atraumatic.   Eyes: Conjunctivae are normal.   Cardiovascular: Normal rate, regular rhythm and normal heart sounds.   Pulmonary/Chest: Effort normal and breath sounds normal.   Abdominal: Soft. Bowel sounds are normal.   Musculoskeletal:         General: No tenderness or deformity.      Cervical back: Neck supple.      Comments: Multiple Heberdens nodes  No pain on compression of MTPs bilateral   Neurological: She is alert and oriented to person, place, and time.   Skin: Skin is warm and dry.   Psychiatric: Mood and affect  normal.     LABS    Component      Latest Ref Rng & Units 12/16/2022 9/12/2022   WBC      3.90 - 12.70 K/uL  5.11   RBC      4.00 - 5.40 M/uL  3.63 (L)   Hemoglobin      12.0 - 16.0 g/dL  11.5 (L)   Hematocrit      37.0 - 48.5 %  36.8 (L)   MCV      82 - 98 fL  101 (H)   MCH      27.0 - 31.0 pg  31.7 (H)   MCHC      32.0 - 36.0 g/dL  31.3 (L)   RDW      11.5 - 14.5 %  13.0   Platelets      150 - 450 K/uL  220   MPV      9.2 - 12.9 fL  9.1 (L)   Immature Granulocytes      0.0 - 0.5 %  0.4   Gran # (ANC)      1.8 - 7.7 K/uL  3.2   Immature Grans (Abs)      0.00 - 0.04 K/uL  0.02   Lymph #      1.0 - 4.8 K/uL  1.3   Mono #      0.3 - 1.0 K/uL  0.5   Eos #      0.0 - 0.5 K/uL  0.1   Baso #      0.00 - 0.20 K/uL  0.01   nRBC      0 /100 WBC  0   Gran %      38.0 - 73.0 %  63.4   Lymph %      18.0 - 48.0 %  25.0   Mono %      4.0 - 15.0 %  8.8   Eosinophil %      0.0 - 8.0 %  2.2   Basophil %      0.0 - 1.9 %  0.2   Differential Method        Automated   Sodium      136 - 145 mmol/L  138   Potassium      3.5 - 5.1 mmol/L  3.7   Chloride      95 - 110 mmol/L  99   CO2      23 - 29 mmol/L  32 (H)   Glucose      70 - 110 mg/dL  90   BUN      8 - 23 mg/dL  16   Creatinine      0.5 - 1.4 mg/dL  0.8   Calcium      8.7 - 10.5 mg/dL  9.9   PROTEIN TOTAL      6.0 - 8.4 g/dL  6.9   Albumin      3.5 - 5.2 g/dL  3.8   BILIRUBIN TOTAL      0.1 - 1.0 mg/dL  0.3   Alkaline Phosphatase      55 - 135 U/L  67   AST      10 - 40 U/L  14   ALT      10 - 44 U/L  11   Anion Gap      8 - 16 mmol/L  7 (L)   eGFR      >60 mL/min/1.73 m:2  >60.0   Specimen UA        Urine, Unspecified   Color, UA      Yellow, Straw, Alisa  Yellow   Appearance, UA      Clear  Clear   pH, UA      5.0 - 8.0  6.0   Specific Gravity, UA      1.005 - 1.030  1.020   Protein, UA      Negative  Negative   Glucose, UA      Negative  Negative   Ketones, UA      Negative  Negative   Bilirubin (UA)      Negative  Negative   Occult Blood UA      Negative  Negative   NITRITE  UA      Negative  Negative   Leukocytes, UA      Negative  Negative   Protein, Urine Random      0 - 15 mg/dL  <7   Creatinine, Urine      15.0 - 325.0 mg/dL  74.0   Prot/Creat Ratio, Urine      0.00 - 0.20  Unable to calculate   SARS Coronavirus 2 Antigen      Negative Negative     Acceptable       Yes    ds DNA Ab      Negative 1:10  Negative 1:10   Complement (C-3)      50 - 180 mg/dL  133   Complement (C-4)      11 - 44 mg/dL  28   CPK      20 - 180 U/L  54   Sed Rate      0 - 36 mm/Hr  54 (H)   CRP      0.0 - 8.2 mg/L  4.0   TSH      0.400 - 4.000 uIU/mL  1.575   BNP      0 - 99 pg/mL  61        Assessment:       1. Sjogern's disease.  Dry eyes persist.  Uses lubricating drops prn.  Restasis daily.  2. Pulmonary HTN.  Tadalafil with Dr. Willson  3. Interstitial lung disease.  Continues on mycophenolate 1,000 mg bid managed by pulmonary Dr. Willson.  Uses O2 with activity and sleep at 2-3l.  Had in January 2021 PFT, walk test and echo with Dr. Willson and will see him today.   4. Joint pain.  Hands, feet and neck.   5. Stress/Depression.  Encourage patient to speak with primary  6. Immunosuppression  7. Former smoker.  3 packs a day for five years.  Quit in 1995  8. Left elbow medial pain.  Suggestive of golfers elbow.  9. Right knee and lower leg pain.  With weather changes  10. Osteoporosis.  Based on L1 compression on x-ray newly developed in 8/2021.   11. Swallowing issue.  Evaluated by GI Dr. Dedra Tolliver who wanted to dilate her but patient decided to wait    Plan:       1.  Labs   2.  Follow with pulmonary for Cellcept.  Will not add Plaquenil since she had all over body rash in past.   3.  Counselor to deal with stress of pandemic and life  4.  Brace for tennis elbow to deal with golfer's elbow.  5.  OTC Voltaren gel for elbow and intermittent knee pain. Ice 2-3x a day.   6.  Continue Tylenol not to exceed 3,000 mg daily  7.  Continue lubricating eye drops three time daily.  Now using Restasis  and will discuss with eye doctor [facility].   8.  Calcium 1,000 mg daily and vitamin D 1,000 unit daily  9.  Patient dentist objected to her using bisphosphonate or denosumab IV.  Patient needs 2 teeth pulled and 1 crown placed.   10.  Continue exercises for hand from OT and start paraffin wax weekly.       RTO 4 months/prn

## 2023-02-07 ENCOUNTER — PATIENT MESSAGE (OUTPATIENT)
Dept: RHEUMATOLOGY | Facility: CLINIC | Age: 73
End: 2023-02-07
Payer: MEDICARE

## 2023-02-07 LAB — DSDNA AB SER-ACNC: NORMAL [IU]/ML

## 2023-02-27 ENCOUNTER — HOSPITAL ENCOUNTER (OUTPATIENT)
Dept: RADIOLOGY | Facility: HOSPITAL | Age: 73
Discharge: HOME | End: 2023-02-27
Attending: OBSTETRICS & GYNECOLOGY
Payer: MEDICARE

## 2023-02-27 DIAGNOSIS — Z12.31 ENCOUNTER FOR SCREENING MAMMOGRAM FOR MALIGNANT NEOPLASM OF BREAST: ICD-10-CM

## 2023-02-27 PROCEDURE — 77067 SCR MAMMO BI INCL CAD: CPT | Mod: 26,,, | Performed by: INTERNAL MEDICINE

## 2023-02-27 PROCEDURE — 77063 BREAST TOMOSYNTHESIS BI: CPT | Mod: 26,,, | Performed by: INTERNAL MEDICINE

## 2023-02-27 PROCEDURE — 77067 MAMMO DIGITAL SCREENING BILAT WITH TOMO: ICD-10-PCS | Mod: 26,,, | Performed by: INTERNAL MEDICINE

## 2023-02-27 PROCEDURE — 77063 MAMMO DIGITAL SCREENING BILAT WITH TOMO: ICD-10-PCS | Mod: 26,,, | Performed by: INTERNAL MEDICINE

## 2023-02-27 PROCEDURE — 77067 SCR MAMMO BI INCL CAD: CPT | Mod: TC

## 2023-05-05 ENCOUNTER — LAB VISIT (OUTPATIENT)
Dept: LAB | Facility: HOSPITAL | Age: 73
End: 2023-05-05
Attending: INTERNAL MEDICINE
Payer: MEDICARE

## 2023-05-05 DIAGNOSIS — J84.9 ILD (INTERSTITIAL LUNG DISEASE): ICD-10-CM

## 2023-05-05 DIAGNOSIS — M35.00 SJOGREN'S SYNDROME, WITH UNSPECIFIED ORGAN INVOLVEMENT: ICD-10-CM

## 2023-05-05 DIAGNOSIS — R53.83 FATIGUE, UNSPECIFIED TYPE: ICD-10-CM

## 2023-05-05 DIAGNOSIS — D84.9 IMMUNOSUPPRESSION: ICD-10-CM

## 2023-05-05 DIAGNOSIS — I27.20 PULMONARY HYPERTENSION: ICD-10-CM

## 2023-05-05 LAB
ALBUMIN SERPL BCP-MCNC: 3.9 G/DL (ref 3.5–5.2)
ALP SERPL-CCNC: 79 U/L (ref 55–135)
ALT SERPL W/O P-5'-P-CCNC: 11 U/L (ref 10–44)
ANION GAP SERPL CALC-SCNC: 11 MMOL/L (ref 8–16)
AST SERPL-CCNC: 18 U/L (ref 10–40)
BASOPHILS # BLD AUTO: 0.01 K/UL (ref 0–0.2)
BASOPHILS NFR BLD: 0.2 % (ref 0–1.9)
BILIRUB SERPL-MCNC: 0.5 MG/DL (ref 0.1–1)
BUN SERPL-MCNC: 21 MG/DL (ref 8–23)
C3 SERPL-MCNC: 138 MG/DL (ref 50–180)
C4 SERPL-MCNC: 32 MG/DL (ref 11–44)
CALCIUM SERPL-MCNC: 10 MG/DL (ref 8.7–10.5)
CHLORIDE SERPL-SCNC: 94 MMOL/L (ref 95–110)
CK SERPL-CCNC: 106 U/L (ref 20–180)
CO2 SERPL-SCNC: 36 MMOL/L (ref 23–29)
CREAT SERPL-MCNC: 1 MG/DL (ref 0.5–1.4)
CRP SERPL-MCNC: 7.1 MG/L (ref 0–8.2)
DIFFERENTIAL METHOD: ABNORMAL
EOSINOPHIL # BLD AUTO: 0.1 K/UL (ref 0–0.5)
EOSINOPHIL NFR BLD: 1.6 % (ref 0–8)
ERYTHROCYTE [DISTWIDTH] IN BLOOD BY AUTOMATED COUNT: 12.9 % (ref 11.5–14.5)
ERYTHROCYTE [SEDIMENTATION RATE] IN BLOOD BY PHOTOMETRIC METHOD: 46 MM/HR (ref 0–36)
EST. GFR  (NO RACE VARIABLE): 59.9 ML/MIN/1.73 M^2
GLUCOSE SERPL-MCNC: 95 MG/DL (ref 70–110)
HCT VFR BLD AUTO: 42.5 % (ref 37–48.5)
HGB BLD-MCNC: 13.1 G/DL (ref 12–16)
IMM GRANULOCYTES # BLD AUTO: 0.02 K/UL (ref 0–0.04)
IMM GRANULOCYTES NFR BLD AUTO: 0.4 % (ref 0–0.5)
LYMPHOCYTES # BLD AUTO: 1.3 K/UL (ref 1–4.8)
LYMPHOCYTES NFR BLD: 24 % (ref 18–48)
MCH RBC QN AUTO: 31.1 PG (ref 27–31)
MCHC RBC AUTO-ENTMCNC: 30.8 G/DL (ref 32–36)
MCV RBC AUTO: 101 FL (ref 82–98)
MONOCYTES # BLD AUTO: 0.5 K/UL (ref 0.3–1)
MONOCYTES NFR BLD: 8.7 % (ref 4–15)
NEUTROPHILS # BLD AUTO: 3.6 K/UL (ref 1.8–7.7)
NEUTROPHILS NFR BLD: 65.1 % (ref 38–73)
NRBC BLD-RTO: 0 /100 WBC
PLATELET # BLD AUTO: 261 K/UL (ref 150–450)
PMV BLD AUTO: 9.4 FL (ref 9.2–12.9)
POTASSIUM SERPL-SCNC: 3.3 MMOL/L (ref 3.5–5.1)
PROT SERPL-MCNC: 7.4 G/DL (ref 6–8.4)
RBC # BLD AUTO: 4.21 M/UL (ref 4–5.4)
SODIUM SERPL-SCNC: 141 MMOL/L (ref 136–145)
WBC # BLD AUTO: 5.51 K/UL (ref 3.9–12.7)

## 2023-05-05 PROCEDURE — 80053 COMPREHEN METABOLIC PANEL: CPT | Performed by: INTERNAL MEDICINE

## 2023-05-05 PROCEDURE — 82550 ASSAY OF CK (CPK): CPT | Performed by: INTERNAL MEDICINE

## 2023-05-05 PROCEDURE — 85025 COMPLETE CBC W/AUTO DIFF WBC: CPT | Performed by: INTERNAL MEDICINE

## 2023-05-05 PROCEDURE — 86225 DNA ANTIBODY NATIVE: CPT | Performed by: INTERNAL MEDICINE

## 2023-05-05 PROCEDURE — 86160 COMPLEMENT ANTIGEN: CPT | Performed by: INTERNAL MEDICINE

## 2023-05-05 PROCEDURE — 36415 COLL VENOUS BLD VENIPUNCTURE: CPT | Mod: PO | Performed by: INTERNAL MEDICINE

## 2023-05-05 PROCEDURE — 85652 RBC SED RATE AUTOMATED: CPT | Performed by: INTERNAL MEDICINE

## 2023-05-05 PROCEDURE — 86140 C-REACTIVE PROTEIN: CPT | Performed by: INTERNAL MEDICINE

## 2023-05-05 PROCEDURE — 86160 COMPLEMENT ANTIGEN: CPT | Mod: 59 | Performed by: INTERNAL MEDICINE

## 2023-05-08 ENCOUNTER — PATIENT MESSAGE (OUTPATIENT)
Dept: RHEUMATOLOGY | Facility: CLINIC | Age: 73
End: 2023-05-08
Payer: MEDICARE

## 2023-05-08 LAB — DSDNA AB SER-ACNC: NORMAL [IU]/ML

## 2023-06-13 DIAGNOSIS — E87.6 HYPOKALEMIA: Primary | ICD-10-CM

## 2023-06-15 ENCOUNTER — OFFICE VISIT (OUTPATIENT)
Dept: PODIATRY | Facility: CLINIC | Age: 73
End: 2023-06-15
Payer: MEDICARE

## 2023-06-15 VITALS
SYSTOLIC BLOOD PRESSURE: 125 MMHG | DIASTOLIC BLOOD PRESSURE: 68 MMHG | BODY MASS INDEX: 30.51 KG/M2 | HEART RATE: 66 BPM | WEIGHT: 206 LBS | HEIGHT: 69 IN

## 2023-06-15 DIAGNOSIS — G62.9 PERIPHERAL POLYNEUROPATHY: ICD-10-CM

## 2023-06-15 DIAGNOSIS — G60.9 IDIOPATHIC PERIPHERAL NEUROPATHY: Primary | ICD-10-CM

## 2023-06-15 DIAGNOSIS — L60.9 DISEASE OF NAIL: ICD-10-CM

## 2023-06-15 PROCEDURE — 1101F PT FALLS ASSESS-DOCD LE1/YR: CPT | Mod: CPTII,S$GLB,, | Performed by: STUDENT IN AN ORGANIZED HEALTH CARE EDUCATION/TRAINING PROGRAM

## 2023-06-15 PROCEDURE — 99999 PR PBB SHADOW E&M-EST. PATIENT-LVL III: ICD-10-PCS | Mod: PBBFAC,,, | Performed by: STUDENT IN AN ORGANIZED HEALTH CARE EDUCATION/TRAINING PROGRAM

## 2023-06-15 PROCEDURE — 3288F FALL RISK ASSESSMENT DOCD: CPT | Mod: CPTII,S$GLB,, | Performed by: STUDENT IN AN ORGANIZED HEALTH CARE EDUCATION/TRAINING PROGRAM

## 2023-06-15 PROCEDURE — 1125F AMNT PAIN NOTED PAIN PRSNT: CPT | Mod: CPTII,S$GLB,, | Performed by: STUDENT IN AN ORGANIZED HEALTH CARE EDUCATION/TRAINING PROGRAM

## 2023-06-15 PROCEDURE — 1159F MED LIST DOCD IN RCRD: CPT | Mod: CPTII,S$GLB,, | Performed by: STUDENT IN AN ORGANIZED HEALTH CARE EDUCATION/TRAINING PROGRAM

## 2023-06-15 PROCEDURE — 3288F PR FALLS RISK ASSESSMENT DOCUMENTED: ICD-10-PCS | Mod: CPTII,S$GLB,, | Performed by: STUDENT IN AN ORGANIZED HEALTH CARE EDUCATION/TRAINING PROGRAM

## 2023-06-15 PROCEDURE — 3074F SYST BP LT 130 MM HG: CPT | Mod: CPTII,S$GLB,, | Performed by: STUDENT IN AN ORGANIZED HEALTH CARE EDUCATION/TRAINING PROGRAM

## 2023-06-15 PROCEDURE — 99203 OFFICE O/P NEW LOW 30 MIN: CPT | Mod: 25,S$GLB,, | Performed by: STUDENT IN AN ORGANIZED HEALTH CARE EDUCATION/TRAINING PROGRAM

## 2023-06-15 PROCEDURE — 11721 DEBRIDE NAIL 6 OR MORE: CPT | Mod: Q9,S$GLB,, | Performed by: STUDENT IN AN ORGANIZED HEALTH CARE EDUCATION/TRAINING PROGRAM

## 2023-06-15 PROCEDURE — 3074F PR MOST RECENT SYSTOLIC BLOOD PRESSURE < 130 MM HG: ICD-10-PCS | Mod: CPTII,S$GLB,, | Performed by: STUDENT IN AN ORGANIZED HEALTH CARE EDUCATION/TRAINING PROGRAM

## 2023-06-15 PROCEDURE — 99203 PR OFFICE/OUTPT VISIT, NEW, LEVL III, 30-44 MIN: ICD-10-PCS | Mod: 25,S$GLB,, | Performed by: STUDENT IN AN ORGANIZED HEALTH CARE EDUCATION/TRAINING PROGRAM

## 2023-06-15 PROCEDURE — 1125F PR PAIN SEVERITY QUANTIFIED, PAIN PRESENT: ICD-10-PCS | Mod: CPTII,S$GLB,, | Performed by: STUDENT IN AN ORGANIZED HEALTH CARE EDUCATION/TRAINING PROGRAM

## 2023-06-15 PROCEDURE — 3008F PR BODY MASS INDEX (BMI) DOCUMENTED: ICD-10-PCS | Mod: CPTII,S$GLB,, | Performed by: STUDENT IN AN ORGANIZED HEALTH CARE EDUCATION/TRAINING PROGRAM

## 2023-06-15 PROCEDURE — 1101F PR PT FALLS ASSESS DOC 0-1 FALLS W/OUT INJ PAST YR: ICD-10-PCS | Mod: CPTII,S$GLB,, | Performed by: STUDENT IN AN ORGANIZED HEALTH CARE EDUCATION/TRAINING PROGRAM

## 2023-06-15 PROCEDURE — 3078F DIAST BP <80 MM HG: CPT | Mod: CPTII,S$GLB,, | Performed by: STUDENT IN AN ORGANIZED HEALTH CARE EDUCATION/TRAINING PROGRAM

## 2023-06-15 PROCEDURE — 3078F PR MOST RECENT DIASTOLIC BLOOD PRESSURE < 80 MM HG: ICD-10-PCS | Mod: CPTII,S$GLB,, | Performed by: STUDENT IN AN ORGANIZED HEALTH CARE EDUCATION/TRAINING PROGRAM

## 2023-06-15 PROCEDURE — 3008F BODY MASS INDEX DOCD: CPT | Mod: CPTII,S$GLB,, | Performed by: STUDENT IN AN ORGANIZED HEALTH CARE EDUCATION/TRAINING PROGRAM

## 2023-06-15 PROCEDURE — 1159F PR MEDICATION LIST DOCUMENTED IN MEDICAL RECORD: ICD-10-PCS | Mod: CPTII,S$GLB,, | Performed by: STUDENT IN AN ORGANIZED HEALTH CARE EDUCATION/TRAINING PROGRAM

## 2023-06-15 PROCEDURE — 99999 PR PBB SHADOW E&M-EST. PATIENT-LVL III: CPT | Mod: PBBFAC,,, | Performed by: STUDENT IN AN ORGANIZED HEALTH CARE EDUCATION/TRAINING PROGRAM

## 2023-06-15 PROCEDURE — 11721 ROUTINE FOOT CARE: ICD-10-PCS | Mod: Q9,S$GLB,, | Performed by: STUDENT IN AN ORGANIZED HEALTH CARE EDUCATION/TRAINING PROGRAM

## 2023-06-15 RX ORDER — POTASSIUM CHLORIDE 750 MG/1
10 TABLET, EXTENDED RELEASE ORAL 2 TIMES DAILY
COMMUNITY

## 2023-06-15 NOTE — PROGRESS NOTES
Subjective:     Patient ID: Bharti Woodard is a 72 y.o. female.    Chief Complaint: Foot Pain (Bilateral foot pain)    Bharti is a 72 y.o. female who presents to the podiatry clinic  with complaint of  bilateral foot pain. Onset of the symptoms was several months ago. Precipitating event: none known. Current symptoms include:  burning/tingling pain to toes, worse at night . Aggravating factors:  none known . Symptoms have gradually worsened. Patient has had prior foot problems. Evaluation to date: none. Treatment to date: none. Patients rates pain 6/10 on pain scale. Relates to thickened toenails that are difficult to trim. Relates numbness to both feet. No further pedal complaints.     Review of Systems   Constitutional: Negative for chills, decreased appetite, diaphoresis and fever.   HENT:  Negative for congestion and hearing loss.    Cardiovascular:  Positive for leg swelling. Negative for chest pain, claudication and syncope.   Respiratory:  Negative for cough and shortness of breath.    Skin:  Positive for dry skin and nail changes. Negative for color change, flushing, itching, poor wound healing and rash.   Musculoskeletal:  Negative for arthritis, back pain, joint pain and joint swelling.   Gastrointestinal:  Negative for nausea and vomiting.   Neurological:  Positive for numbness and paresthesias. Negative for focal weakness and weakness.   Psychiatric/Behavioral:  Negative for altered mental status. The patient is not nervous/anxious.       Objective:     Physical Exam  Constitutional:       General: She is not in acute distress.     Appearance: She is well-developed. She is not diaphoretic.   Cardiovascular:      Comments: Dorsalis pedis and posterior tibial pulses are within normal limits. Skin temperature is within normal limits. Toes are cool to touch and feet are warm proximally. Hair growth is diminished. Skin is mildly atrophic and with mild hyperpigmentation. Mild edema noted, bilaterally. Telangiectasias  bilaterally.  Musculoskeletal:         General: No tenderness.      Comments: Adequate joint range of motion without pain, limitation, nor crepitation to bilateral feet and ankle joints. Muscle strength is 5/5 in all groups bilaterally.       Lymphadenopathy:      Comments: Negative lymphangitic streaking    Skin:     General: Skin is warm and dry.      Findings: No lesion.      Comments: Skin is warm and dry, no acute signs of infection noted. No open wounds, macerations or hyperkeratotic lesions, bilaterally.     No toenail to bilateral hallux. Toenails 2-5 are thickened by 2-4 mm's, dystrophic, and are darkened in coloration with subungual fungal debris, bilaterally.  Skin is very dry, bilaterally.      Neurological:      Mental Status: She is alert and oriented to person, place, and time.      Sensory: Sensory deficit present.      Motor: No abnormal muscle tone.      Comments: Light touch within normal limits. Hopkins-Shaan 5.07 monofilamant testing is diminished. Vibratory sensation  is diminished, bilaterally    Psychiatric:         Behavior: Behavior normal.         Thought Content: Thought content normal.         Judgment: Judgment normal.       Assessment:      Encounter Diagnoses   Name Primary?    Peripheral polyneuropathy     Idiopathic peripheral neuropathy Yes    Disease of nail      Plan:     Bharti was seen today for foot pain.    Diagnoses and all orders for this visit:    Idiopathic peripheral neuropathy  -     Routine Foot Care    Peripheral polyneuropathy  -     Ambulatory referral/consult to Neurology; Future    Disease of nail  -     Routine Foot Care      I counseled the patient on her conditions, their implications and medical management.  Routine foot care per attached note  Rx topical analgesic containing Gabapentin for symptoms of peripheral neuropathy from professional [facility] pharmacy, recommend follow up with Neurology for further management  Recommend supportive and protective  tennis/orthopedic shoes at all times while ambulating. Daily and frequent foot checks  Shoe inspection. General Foot Education. Patient instructed on proper foot hygeine. We discussed wearing proper shoe gear, daily foot inspections, never walking without protective shoe gear, never putting sharp instruments to feet. Patient reminded of the importance of good nutrition, weight loss if needed to help alleviate foot pressure/pain   Return to clinic in 1 year or PRN as discussed

## 2023-06-15 NOTE — PROCEDURES
"Routine Foot Care    Date/Time: 6/15/2023 2:15 PM  Performed by: Brianna Ramos DPM  Authorized by: Brianna Ramos DPM     Time out: Immediately prior to procedure a "time out" was called to verify the correct patient, procedure, equipment, support staff and site/side marked as required.    Consent Done?:  Yes (Verbal)  Hyperkeratotic Skin Lesions?: No      Nail Care Type:  Debride(Left 3rd Toe, Left 2nd Toe, Left 4th Toe, Left 5th Toe, Right 2nd Toe, Right 3rd Toe, Right 4th Toe and Right 5th Toe)  Patient tolerance:  Patient tolerated the procedure well with no immediate complications  "

## 2023-06-19 ENCOUNTER — LAB VISIT (OUTPATIENT)
Dept: LAB | Facility: HOSPITAL | Age: 73
End: 2023-06-19
Attending: INTERNAL MEDICINE
Payer: MEDICARE

## 2023-06-19 DIAGNOSIS — E87.6 HYPOKALEMIA: ICD-10-CM

## 2023-06-19 LAB
ANION GAP SERPL CALC-SCNC: 9 MMOL/L (ref 8–16)
BUN SERPL-MCNC: 15 MG/DL (ref 8–23)
CALCIUM SERPL-MCNC: 10.1 MG/DL (ref 8.7–10.5)
CHLORIDE SERPL-SCNC: 101 MMOL/L (ref 95–110)
CO2 SERPL-SCNC: 30 MMOL/L (ref 23–29)
CREAT SERPL-MCNC: 1 MG/DL (ref 0.5–1.4)
EST. GFR  (NO RACE VARIABLE): 60 ML/MIN/1.73 M^2
GLUCOSE SERPL-MCNC: 104 MG/DL (ref 70–110)
MAGNESIUM SERPL-MCNC: 2.1 MG/DL (ref 1.6–2.6)
POTASSIUM SERPL-SCNC: 4.5 MMOL/L (ref 3.5–5.1)
SODIUM SERPL-SCNC: 140 MMOL/L (ref 136–145)

## 2023-06-19 PROCEDURE — 36415 COLL VENOUS BLD VENIPUNCTURE: CPT | Performed by: INTERNAL MEDICINE

## 2023-06-19 PROCEDURE — 83735 ASSAY OF MAGNESIUM: CPT | Performed by: INTERNAL MEDICINE

## 2023-06-19 PROCEDURE — 80048 BASIC METABOLIC PNL TOTAL CA: CPT | Performed by: INTERNAL MEDICINE

## 2023-06-22 ENCOUNTER — OFFICE VISIT (OUTPATIENT)
Dept: RHEUMATOLOGY | Facility: CLINIC | Age: 73
End: 2023-06-22
Payer: MEDICARE

## 2023-06-22 ENCOUNTER — LAB VISIT (OUTPATIENT)
Dept: LAB | Facility: HOSPITAL | Age: 73
End: 2023-06-22
Attending: INTERNAL MEDICINE
Payer: MEDICARE

## 2023-06-22 VITALS
BODY MASS INDEX: 30.57 KG/M2 | WEIGHT: 206.38 LBS | SYSTOLIC BLOOD PRESSURE: 110 MMHG | HEART RATE: 67 BPM | DIASTOLIC BLOOD PRESSURE: 60 MMHG | HEIGHT: 69 IN

## 2023-06-22 DIAGNOSIS — D84.9 IMMUNOSUPPRESSION: ICD-10-CM

## 2023-06-22 DIAGNOSIS — M35.00 SJOGREN'S SYNDROME, WITH UNSPECIFIED ORGAN INVOLVEMENT: Primary | ICD-10-CM

## 2023-06-22 DIAGNOSIS — R53.83 FATIGUE, UNSPECIFIED TYPE: ICD-10-CM

## 2023-06-22 DIAGNOSIS — M80.08XD FRACTURE OF VERTEBRA DUE TO OSTEOPOROSIS WITH ROUTINE HEALING, SUBSEQUENT ENCOUNTER: ICD-10-CM

## 2023-06-22 DIAGNOSIS — R06.02 SHORTNESS OF BREATH: ICD-10-CM

## 2023-06-22 DIAGNOSIS — M35.00 SJOGREN'S SYNDROME, WITH UNSPECIFIED ORGAN INVOLVEMENT: ICD-10-CM

## 2023-06-22 DIAGNOSIS — I27.20 PULMONARY HYPERTENSION: ICD-10-CM

## 2023-06-22 DIAGNOSIS — J84.9 ILD (INTERSTITIAL LUNG DISEASE): ICD-10-CM

## 2023-06-22 LAB
25(OH)D3+25(OH)D2 SERPL-MCNC: 57 NG/ML (ref 30–96)
BASOPHILS # BLD AUTO: 0.01 K/UL (ref 0–0.2)
BASOPHILS NFR BLD: 0.2 % (ref 0–1.9)
C3 SERPL-MCNC: 153 MG/DL (ref 50–180)
C4 SERPL-MCNC: 30 MG/DL (ref 11–44)
CK SERPL-CCNC: 46 U/L (ref 20–180)
CRP SERPL-MCNC: 4.9 MG/L (ref 0–8.2)
DIFFERENTIAL METHOD: ABNORMAL
EOSINOPHIL # BLD AUTO: 0.1 K/UL (ref 0–0.5)
EOSINOPHIL NFR BLD: 1.2 % (ref 0–8)
ERYTHROCYTE [DISTWIDTH] IN BLOOD BY AUTOMATED COUNT: 12.8 % (ref 11.5–14.5)
ERYTHROCYTE [SEDIMENTATION RATE] IN BLOOD BY PHOTOMETRIC METHOD: 39 MM/HR (ref 0–36)
HCT VFR BLD AUTO: 39.1 % (ref 37–48.5)
HGB BLD-MCNC: 12.4 G/DL (ref 12–16)
IMM GRANULOCYTES # BLD AUTO: 0.04 K/UL (ref 0–0.04)
IMM GRANULOCYTES NFR BLD AUTO: 0.7 % (ref 0–0.5)
LYMPHOCYTES # BLD AUTO: 1.3 K/UL (ref 1–4.8)
LYMPHOCYTES NFR BLD: 22.6 % (ref 18–48)
MCH RBC QN AUTO: 32.2 PG (ref 27–31)
MCHC RBC AUTO-ENTMCNC: 31.7 G/DL (ref 32–36)
MCV RBC AUTO: 102 FL (ref 82–98)
MONOCYTES # BLD AUTO: 0.5 K/UL (ref 0.3–1)
MONOCYTES NFR BLD: 9.2 % (ref 4–15)
NEUTROPHILS # BLD AUTO: 3.8 K/UL (ref 1.8–7.7)
NEUTROPHILS NFR BLD: 66.1 % (ref 38–73)
NRBC BLD-RTO: 0 /100 WBC
PLATELET # BLD AUTO: 216 K/UL (ref 150–450)
PMV BLD AUTO: 9.1 FL (ref 9.2–12.9)
RBC # BLD AUTO: 3.85 M/UL (ref 4–5.4)
T4 FREE SERPL-MCNC: 1.13 NG/DL (ref 0.71–1.51)
TSH SERPL DL<=0.005 MIU/L-ACNC: 1.6 UIU/ML (ref 0.4–4)
WBC # BLD AUTO: 5.75 K/UL (ref 3.9–12.7)

## 2023-06-22 PROCEDURE — 86160 COMPLEMENT ANTIGEN: CPT | Mod: 59 | Performed by: INTERNAL MEDICINE

## 2023-06-22 PROCEDURE — 1125F AMNT PAIN NOTED PAIN PRSNT: CPT | Mod: CPTII,S$GLB,, | Performed by: INTERNAL MEDICINE

## 2023-06-22 PROCEDURE — 1160F RVW MEDS BY RX/DR IN RCRD: CPT | Mod: CPTII,S$GLB,, | Performed by: INTERNAL MEDICINE

## 2023-06-22 PROCEDURE — 85025 COMPLETE CBC W/AUTO DIFF WBC: CPT | Performed by: INTERNAL MEDICINE

## 2023-06-22 PROCEDURE — 86160 COMPLEMENT ANTIGEN: CPT | Performed by: INTERNAL MEDICINE

## 2023-06-22 PROCEDURE — 1159F PR MEDICATION LIST DOCUMENTED IN MEDICAL RECORD: ICD-10-PCS | Mod: CPTII,S$GLB,, | Performed by: INTERNAL MEDICINE

## 2023-06-22 PROCEDURE — 3074F PR MOST RECENT SYSTOLIC BLOOD PRESSURE < 130 MM HG: ICD-10-PCS | Mod: CPTII,S$GLB,, | Performed by: INTERNAL MEDICINE

## 2023-06-22 PROCEDURE — 99999 PR PBB SHADOW E&M-EST. PATIENT-LVL III: ICD-10-PCS | Mod: PBBFAC,,, | Performed by: INTERNAL MEDICINE

## 2023-06-22 PROCEDURE — 1160F PR REVIEW ALL MEDS BY PRESCRIBER/CLIN PHARMACIST DOCUMENTED: ICD-10-PCS | Mod: CPTII,S$GLB,, | Performed by: INTERNAL MEDICINE

## 2023-06-22 PROCEDURE — 86140 C-REACTIVE PROTEIN: CPT | Performed by: INTERNAL MEDICINE

## 2023-06-22 PROCEDURE — 36415 COLL VENOUS BLD VENIPUNCTURE: CPT | Performed by: INTERNAL MEDICINE

## 2023-06-22 PROCEDURE — 99214 OFFICE O/P EST MOD 30 MIN: CPT | Mod: S$GLB,,, | Performed by: INTERNAL MEDICINE

## 2023-06-22 PROCEDURE — 82550 ASSAY OF CK (CPK): CPT | Performed by: INTERNAL MEDICINE

## 2023-06-22 PROCEDURE — 86225 DNA ANTIBODY NATIVE: CPT | Performed by: INTERNAL MEDICINE

## 2023-06-22 PROCEDURE — 3078F DIAST BP <80 MM HG: CPT | Mod: CPTII,S$GLB,, | Performed by: INTERNAL MEDICINE

## 2023-06-22 PROCEDURE — 84443 ASSAY THYROID STIM HORMONE: CPT | Performed by: INTERNAL MEDICINE

## 2023-06-22 PROCEDURE — 99214 PR OFFICE/OUTPT VISIT, EST, LEVL IV, 30-39 MIN: ICD-10-PCS | Mod: S$GLB,,, | Performed by: INTERNAL MEDICINE

## 2023-06-22 PROCEDURE — 82306 VITAMIN D 25 HYDROXY: CPT | Performed by: INTERNAL MEDICINE

## 2023-06-22 PROCEDURE — 3008F BODY MASS INDEX DOCD: CPT | Mod: CPTII,S$GLB,, | Performed by: INTERNAL MEDICINE

## 2023-06-22 PROCEDURE — 3078F PR MOST RECENT DIASTOLIC BLOOD PRESSURE < 80 MM HG: ICD-10-PCS | Mod: CPTII,S$GLB,, | Performed by: INTERNAL MEDICINE

## 2023-06-22 PROCEDURE — 84439 ASSAY OF FREE THYROXINE: CPT | Performed by: INTERNAL MEDICINE

## 2023-06-22 PROCEDURE — 99999 PR PBB SHADOW E&M-EST. PATIENT-LVL III: CPT | Mod: PBBFAC,,, | Performed by: INTERNAL MEDICINE

## 2023-06-22 PROCEDURE — 1125F PR PAIN SEVERITY QUANTIFIED, PAIN PRESENT: ICD-10-PCS | Mod: CPTII,S$GLB,, | Performed by: INTERNAL MEDICINE

## 2023-06-22 PROCEDURE — 1159F MED LIST DOCD IN RCRD: CPT | Mod: CPTII,S$GLB,, | Performed by: INTERNAL MEDICINE

## 2023-06-22 PROCEDURE — 3074F SYST BP LT 130 MM HG: CPT | Mod: CPTII,S$GLB,, | Performed by: INTERNAL MEDICINE

## 2023-06-22 PROCEDURE — 85652 RBC SED RATE AUTOMATED: CPT | Performed by: INTERNAL MEDICINE

## 2023-06-22 PROCEDURE — 3008F PR BODY MASS INDEX (BMI) DOCUMENTED: ICD-10-PCS | Mod: CPTII,S$GLB,, | Performed by: INTERNAL MEDICINE

## 2023-06-22 NOTE — PROGRESS NOTES
Subjective:       Patient ID: Bharti Woodard is a 72 y.o. female.    Chief Complaint: Sjogrens and ILD    HPI:  Bharti Woodard is a 72 y.o. female with history of Sjogrens diagnosed 50-59 yo. She has difficulty remembering history.   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  Dr. Aldridge at [facility] saw her and diagnosed with lupus and gave prednisone and Plaquenil but she gained 60 pounds.    Saw Dr. Tirado a rheumatologist who diagnosed Sjogrens and sent her a pulmonologist.  Dr. Tirado gave her Mycophenolate.  Pulmonary HTN diagnosed 2010 with Dr. Evans.  Dr. Cassi Nunez rheumatologist at [facility] saw her and was planning to cut down on medication in future.  Patient decided to lower in her own so they felt she was noncompliant.  Patient decided to switch.   Saw Dr. Dunne who did not want to treat her since care needed to be coordinated with rheumatologist.  So she saw Dr. Cabrera and Dr. Herbert.      January 2020 felt very sick.  Tested negative for flu.  She was given antibiotics.  Since then has been fatigued with intermittent joint pains as well as tingling in her feet.     Tylenol 650 mg three times daily helps pain in neck, feet, ankle and hands. Nothing worsens pain.  Pain began after URI treated in January 2020.       Review of chart shows that patient was seen on 2/15/2005 by Dr. Lenora Ackerman in rheumatology for a second opinion.  Per that note patient had persistently elevate sedimentation rate and was diagnosed as PMR by Dr. Aldridge who treated her with prednisone that was later stopped.  Plaquenil was given but caused all over body rash.    Care Everywhere:  1/29/2019 Dr. Herbert note  Biopsy:   Biopsy at  10/2012: diffuse scattered non-necrotizing granulomas (special stains negative) with some areas of fibrosis, but mostly normal lung; scattered lymphoid aggregates    Labs:  November 2014: SSA positive  VENITA 1:1280, positive dsDNA, centromere and Sm/RNP, negative Sm, Scl70, RF, SSB       Had COVID  "in June 2022 and was treated with Paxlovid.       INTERVAL HISTORY:    Works 2-3 days a week for 5 hours as a .     Notes 6 months of fatigue.  Uses oxygen at night and when walking.    Saw Dr. Willson in June and discussed SOB and fatigue.  She has been using CPAP regularly for at least 3 months.   Did 6 minute walk test and O2 did not drop.   Echocardiogram did not show change per patient and PFTs were improved.     One or two episodes since last visit difficulty with swallowing after drinking water.    Still uses eye drops for lubricating 1-2 times day prn. Using Restasis. Eyes not as dry since potassium elevated.  Dr. Willson gave meds to correct low potassium.    Saw podiatry who diagnosed neuropathy in legs.  Will see neurology in July regarding tightening in legs            Review of Systems   Constitutional:  Positive for fatigue. Negative for fever and unexpected weight change.   HENT:  Negative for mouth sores and trouble swallowing.    Eyes:  Negative for redness.   Respiratory:  Negative for cough and shortness of breath.    Cardiovascular:  Negative for chest pain.   Gastrointestinal:  Negative for constipation and diarrhea.   Endocrine: Negative.    Genitourinary:  Negative for dysuria and genital sores.   Musculoskeletal:  Negative for arthralgias.   Skin:  Negative for rash.   Neurological:  Negative for headaches.   Hematological:  Does not bruise/bleed easily.       Objective:   /60   Pulse 67   Ht 5' 9" (1.753 m)   Wt 93.6 kg (206 lb 5.6 oz)   LMP 01/01/1976   BMI 30.47 kg/m²      Physical Exam   Constitutional: She is oriented to person, place, and time.   HENT:   Head: Normocephalic and atraumatic.   Eyes: Conjunctivae are normal.   Cardiovascular: Normal rate, regular rhythm and normal heart sounds.   Pulmonary/Chest: Effort normal and breath sounds normal.   Abdominal: Soft. Bowel sounds are normal.   Musculoskeletal:         General: No tenderness or deformity.      Cervical " back: Neck supple.      Comments: Multiple Heberdens nodes  No pain on compression of MTPs bilateral   Neurological: She is alert and oriented to person, place, and time.   Skin: Skin is warm and dry.   Psychiatric: Mood and affect normal.     LABS    Component      Latest Ref Rng & Units 6/19/2023 5/5/2023   WBC      3.90 - 12.70 K/uL  5.51   RBC      4.00 - 5.40 M/uL  4.21   Hemoglobin      12.0 - 16.0 g/dL  13.1   Hematocrit      37.0 - 48.5 %  42.5   MCV      82 - 98 fL  101 (H)   MCH      27.0 - 31.0 pg  31.1 (H)   MCHC      32.0 - 36.0 g/dL  30.8 (L)   RDW      11.5 - 14.5 %  12.9   Platelets      150 - 450 K/uL  261   MPV      9.2 - 12.9 fL  9.4   Immature Granulocytes      0.0 - 0.5 %  0.4   Gran # (ANC)      1.8 - 7.7 K/uL  3.6   Immature Grans (Abs)      0.00 - 0.04 K/uL  0.02   Lymph #      1.0 - 4.8 K/uL  1.3   Mono #      0.3 - 1.0 K/uL  0.5   Eos #      0.0 - 0.5 K/uL  0.1   Baso #      0.00 - 0.20 K/uL  0.01   nRBC      0 /100 WBC  0   Gran %      38.0 - 73.0 %  65.1   Lymph %      18.0 - 48.0 %  24.0   Mono %      4.0 - 15.0 %  8.7   Eosinophil %      0.0 - 8.0 %  1.6   Basophil %      0.0 - 1.9 %  0.2   Differential Method        Automated   Sodium      136 - 145 mmol/L 140 141   Potassium      3.5 - 5.1 mmol/L 4.5 3.3 (L)   Chloride      95 - 110 mmol/L 101 94 (L)   CO2      23 - 29 mmol/L 30 (H) 36 (H)   Glucose      70 - 110 mg/dL 104 95   BUN      8 - 23 mg/dL 15 21   Creatinine      0.5 - 1.4 mg/dL 1.0 1.0   Calcium      8.7 - 10.5 mg/dL 10.1 10.0   PROTEIN TOTAL      6.0 - 8.4 g/dL  7.4   Albumin      3.5 - 5.2 g/dL  3.9   BILIRUBIN TOTAL      0.1 - 1.0 mg/dL  0.5   Alkaline Phosphatase      55 - 135 U/L  79   AST      10 - 40 U/L  18   ALT      10 - 44 U/L  11   Anion Gap      8 - 16 mmol/L 9 11   eGFR      >60 mL/min/1.73 m:2 60 59.9 (A)   Specimen UA        Urine, Clean Catch   Color, UA      Yellow, Straw, Alisa  Yellow   Appearance, UA      Clear  Hazy (A)   pH, UA      5.0 - 8.0  5.0    Specific Gravity, UA      1.005 - 1.030  1.010   Protein, UA      Negative  Negative   Glucose, UA      Negative  Negative   Ketones, UA      Negative  Negative   Bilirubin (UA)      Negative  Negative   Occult Blood UA      Negative  Negative   NITRITE UA      Negative  Negative   Leukocytes, UA      Negative  Negative   Protein, Urine Random      0 - 15 mg/dL  8   Creatinine, Urine      15.0 - 325.0 mg/dL  180.0   Prot/Creat Ratio, Urine      0.00 - 0.20  0.04   Complement (C-3)      50 - 180 mg/dL  138   Complement (C-4)      11 - 44 mg/dL  32   CPK      20 - 180 U/L  106   ds DNA Ab      Negative 1:10  Negative 1:10   Sed Rate      0 - 36 mm/Hr  46 (H)   CRP      0.0 - 8.2 mg/L  7.1   Magnesium      1.6 - 2.6 mg/dL 2.1         Assessment:       1. Sjogern's disease.  Dry eyes persist.  Uses lubricating drops prn.  Restasis daily.  2. Pulmonary HTN.  Tadalafil with Dr. Willson  3. Interstitial lung disease.  Continues on mycophenolate 1,000 mg bid managed by pulmonary Dr. Willson.    Uses O2 with activity and sleep at 2-3l.  Had in January 2021 PFT, walk test and echo with Dr. Willson and will see him today.   4. Joint pain.  Hands, feet and neck.   5. Stress/Depression.  Encourage patient to speak with primary  6. Immunosuppression  7. Former smoker.  3 packs a day for five years.  Quit in 1995  8. Left elbow medial pain.  Suggestive of golfers elbow.  9. Right knee and lower leg pain.  With weather changes  10. Osteoporosis.  Based on L1 compression on x-ray newly developed in 8/2021.   Repeat bone density 1/2024  11. Swallowing issue.  Evaluated by GI Dr. Dedra Tolliver who wanted to dilate her but patient decided to wait    Plan:       1.  Labs now to evaluate fatigue and SOB.    2.  Follow with pulmonary for Cellcept.  Will not add Plaquenil since she had all over body rash in past.   3.  Consider using O2 daily  4.  Consider using brace for tennis elbow to deal with golfer's elbow.  5.  OTC Voltaren gel for  elbow and intermittent knee pain. Ice 2-3x a day.   6.  Continue Tylenol not to exceed 3,000 mg daily  7.  Continue lubricating eye drops three time daily.  Using Restasis.  Was interested in discussing Vuity with eye doctor. .   8.  Calcium 1,000 mg daily and vitamin D 1,000 unit daily  9.  Patient dentist objected to her using bisphosphonate or denosumab IV.  Patient following with dentist still has not pulled teeth and 1 crown still needs to placed.   Fall prevention discussed  10.  Continue exercises for hand from OT and start paraffin wax weekly.       RTO 4 months/prn

## 2023-06-23 ENCOUNTER — PATIENT MESSAGE (OUTPATIENT)
Dept: RHEUMATOLOGY | Facility: CLINIC | Age: 73
End: 2023-06-23
Payer: MEDICARE

## 2023-06-23 LAB — DSDNA AB SER-ACNC: NORMAL [IU]/ML

## 2023-06-26 ENCOUNTER — PATIENT MESSAGE (OUTPATIENT)
Dept: RHEUMATOLOGY | Facility: CLINIC | Age: 73
End: 2023-06-26
Payer: MEDICARE

## 2023-07-27 ENCOUNTER — OFFICE VISIT (OUTPATIENT)
Dept: NEUROLOGY | Facility: CLINIC | Age: 73
End: 2023-07-27
Payer: MEDICARE

## 2023-07-27 VITALS
DIASTOLIC BLOOD PRESSURE: 59 MMHG | WEIGHT: 206 LBS | BODY MASS INDEX: 30.51 KG/M2 | SYSTOLIC BLOOD PRESSURE: 103 MMHG | HEIGHT: 69 IN | HEART RATE: 70 BPM

## 2023-07-27 DIAGNOSIS — M54.16 LUMBAR RADICULOPATHY: Primary | ICD-10-CM

## 2023-07-27 DIAGNOSIS — G61.9 INFLAMMATORY NEUROPATHY: ICD-10-CM

## 2023-07-27 DIAGNOSIS — M54.9 DORSALGIA, UNSPECIFIED: ICD-10-CM

## 2023-07-27 DIAGNOSIS — D33.3 LEFT ACOUSTIC NEUROMA: ICD-10-CM

## 2023-07-27 DIAGNOSIS — I87.2 VENOUS INSUFFICIENCY OF LOWER EXTREMITY, UNSPECIFIED LATERALITY: ICD-10-CM

## 2023-07-27 DIAGNOSIS — G62.9 PERIPHERAL POLYNEUROPATHY: ICD-10-CM

## 2023-07-27 DIAGNOSIS — M35.00 SJOGREN'S SYNDROME, WITH UNSPECIFIED ORGAN INVOLVEMENT: ICD-10-CM

## 2023-07-27 PROCEDURE — 3008F BODY MASS INDEX DOCD: CPT | Mod: CPTII,S$GLB,, | Performed by: PSYCHIATRY & NEUROLOGY

## 2023-07-27 PROCEDURE — 1125F PR PAIN SEVERITY QUANTIFIED, PAIN PRESENT: ICD-10-PCS | Mod: CPTII,S$GLB,, | Performed by: PSYCHIATRY & NEUROLOGY

## 2023-07-27 PROCEDURE — 1160F PR REVIEW ALL MEDS BY PRESCRIBER/CLIN PHARMACIST DOCUMENTED: ICD-10-PCS | Mod: CPTII,S$GLB,, | Performed by: PSYCHIATRY & NEUROLOGY

## 2023-07-27 PROCEDURE — 1101F PT FALLS ASSESS-DOCD LE1/YR: CPT | Mod: CPTII,S$GLB,, | Performed by: PSYCHIATRY & NEUROLOGY

## 2023-07-27 PROCEDURE — 99205 OFFICE O/P NEW HI 60 MIN: CPT | Mod: S$GLB,,, | Performed by: PSYCHIATRY & NEUROLOGY

## 2023-07-27 PROCEDURE — 1159F PR MEDICATION LIST DOCUMENTED IN MEDICAL RECORD: ICD-10-PCS | Mod: CPTII,S$GLB,, | Performed by: PSYCHIATRY & NEUROLOGY

## 2023-07-27 PROCEDURE — 99205 PR OFFICE/OUTPT VISIT, NEW, LEVL V, 60-74 MIN: ICD-10-PCS | Mod: S$GLB,,, | Performed by: PSYCHIATRY & NEUROLOGY

## 2023-07-27 PROCEDURE — 1160F RVW MEDS BY RX/DR IN RCRD: CPT | Mod: CPTII,S$GLB,, | Performed by: PSYCHIATRY & NEUROLOGY

## 2023-07-27 PROCEDURE — 3008F PR BODY MASS INDEX (BMI) DOCUMENTED: ICD-10-PCS | Mod: CPTII,S$GLB,, | Performed by: PSYCHIATRY & NEUROLOGY

## 2023-07-27 PROCEDURE — 3288F FALL RISK ASSESSMENT DOCD: CPT | Mod: CPTII,S$GLB,, | Performed by: PSYCHIATRY & NEUROLOGY

## 2023-07-27 PROCEDURE — 99999 PR PBB SHADOW E&M-EST. PATIENT-LVL IV: ICD-10-PCS | Mod: PBBFAC,,, | Performed by: PSYCHIATRY & NEUROLOGY

## 2023-07-27 PROCEDURE — 3078F PR MOST RECENT DIASTOLIC BLOOD PRESSURE < 80 MM HG: ICD-10-PCS | Mod: CPTII,S$GLB,, | Performed by: PSYCHIATRY & NEUROLOGY

## 2023-07-27 PROCEDURE — 3078F DIAST BP <80 MM HG: CPT | Mod: CPTII,S$GLB,, | Performed by: PSYCHIATRY & NEUROLOGY

## 2023-07-27 PROCEDURE — 1159F MED LIST DOCD IN RCRD: CPT | Mod: CPTII,S$GLB,, | Performed by: PSYCHIATRY & NEUROLOGY

## 2023-07-27 PROCEDURE — 3074F SYST BP LT 130 MM HG: CPT | Mod: CPTII,S$GLB,, | Performed by: PSYCHIATRY & NEUROLOGY

## 2023-07-27 PROCEDURE — 1101F PR PT FALLS ASSESS DOC 0-1 FALLS W/OUT INJ PAST YR: ICD-10-PCS | Mod: CPTII,S$GLB,, | Performed by: PSYCHIATRY & NEUROLOGY

## 2023-07-27 PROCEDURE — 3074F PR MOST RECENT SYSTOLIC BLOOD PRESSURE < 130 MM HG: ICD-10-PCS | Mod: CPTII,S$GLB,, | Performed by: PSYCHIATRY & NEUROLOGY

## 2023-07-27 PROCEDURE — 1125F AMNT PAIN NOTED PAIN PRSNT: CPT | Mod: CPTII,S$GLB,, | Performed by: PSYCHIATRY & NEUROLOGY

## 2023-07-27 PROCEDURE — 3288F PR FALLS RISK ASSESSMENT DOCUMENTED: ICD-10-PCS | Mod: CPTII,S$GLB,, | Performed by: PSYCHIATRY & NEUROLOGY

## 2023-07-27 PROCEDURE — 99999 PR PBB SHADOW E&M-EST. PATIENT-LVL IV: CPT | Mod: PBBFAC,,, | Performed by: PSYCHIATRY & NEUROLOGY

## 2023-07-27 RX ORDER — GABAPENTIN 300 MG/1
300 CAPSULE ORAL 2 TIMES DAILY
Qty: 60 CAPSULE | Refills: 11 | Status: SHIPPED | OUTPATIENT
Start: 2023-07-27 | End: 2023-11-08

## 2023-07-27 RX ORDER — POTASSIUM CHLORIDE 20 MEQ/1
20 TABLET, EXTENDED RELEASE ORAL 2 TIMES DAILY
COMMUNITY
Start: 2023-06-12

## 2023-07-27 NOTE — PROGRESS NOTES
[facility] NEUROLOGY  [facility], [place]    Date: 7/27/23  Patient Name: Bharti Woodard   MRN: [id number]   PCP: Nithin Edwards  Referring Provider: Brianna Ramos DPM    Chief Complaint:  Peripheral polyneuropathy, neurogenic claudication left lower extremity pain  Subjective:   Patient seen in consultation at the request of Brianna Ramos DPM for the evaluation of the above chief complaints. A copy of this note will be sent to the referring physician.      HPI:   Ms. Bharti Woodard is a 72 y.o. right-handed female presenting for evaluation of lower extremity complaints.      Patient states that she is been dealing with neuropathy in the feet/lower extremities for many years, greater than 5 years.  Symptoms have included burning/pins/needles especially in the bottoms of the feet.  Over the years, she has slowly developed a tightening sensation that occurs over the top of the foot and in the bilateral lower legs.  Somewhat more bothersome over the last year.  Has never been prescribed medications to take orally for the complaint.  Notes that she was prescribed a topical agent by podiatry but never received it.    Also describes a persistent tightness and mild pain that radiates from the left buttocks and hip down the lateral portion of the leg past the level of the knee.  Exacerbated by walking and exercise.  In the same general area, the patient experiences actual pain when attempting to climb stairs and lead with the left leg.  Has had x-ray lumbar spine completed in August 2021 which we reviewed together during today's encounter.  Chronic L1 compression fracture was identified at the time.  Various other mild degenerative changes.    No recent falls or injuries.  Participates in light Pilates which helps with mobility and keeping previous sciatica pains at [facility].  She was able to clearly delineate previous experiences with sciatica from the left lateral leg complaints described  today.    Confidently denies any changes in strength in the lower extremities.  No bowel/bladder incontinence or saddle anesthesia.    History of Sjogren's.  Follows with Rheumatology.  On chronic immunosuppressants.  History of pulmonary pulmonary hypertension, interstitial lung disease.    History of venous insufficiency, peripheral vascular disease affecting the lower extremities.  Could be contributing to some of the heaviness described after mild-to-moderate exercise.    Known history of left-sided schwannoma with no hearing complaints.  Follows with Neurosurgery with planned yearly MRI to monitor for growth.  No new hearing complaints.    PAST MEDICAL HISTORY:  Past Medical History:   Diagnosis Date    Depression     ILD (interstitial lung disease)     Pulmonary hypertension     Sjoegren syndrome        PAST SURGICAL HISTORY:  Past Surgical History:   Procedure Laterality Date    CARDIAC CATHETERIZATION Right 2016    Cataract surgery Bilateral     COLONOSCOPY N/A 8/3/2021    Procedure: COLONOSCOPY;  Surgeon: Dedra Tolliver MD;  Location: [address]);  Service: Endoscopy;  Laterality: N/A;    ESOPHAGEAL MANOMETRY WITH MEASUREMENT OF IMPEDANCE N/A 8/3/2021    Procedure: MANOMETRY, ESOPHAGUS, WITH IMPEDANCE MEASUREMENT;  Surgeon: Dedra Tolliver MD;  Location: [address]);  Service: Endoscopy;  Laterality: N/A;  r/o rumination    ESOPHAGOGASTRODUODENOSCOPY N/A 6/15/2021    Procedure: EGD (ESOPHAGOGASTRODUODENOSCOPY);  Surgeon: Yuriy Zimmerman MD;  Location: [facility];  Service: Endoscopy;  Laterality: N/A;    ESOPHAGOGASTRODUODENOSCOPY N/A 8/3/2021    Procedure: EGD (ESOPHAGOGASTRODUODENOSCOPY) with endoflip, esophageal manometry catheter placement, followed by esophageal manometry with dysphagia and rumination protocol AND colonoscopy.  Colonoscopy should be first, followed by EGD.;  Surgeon: Dedra Tolliver MD;  Location: [address]);  Service: Endoscopy;  Laterality: N/A;   Endoflip/endoscopically placed manometry probe  2nd floor-Pu    HYSTERECTOMY      26 yrs old    LUNG BIOPSY         CURRENT MEDS:  Current Outpatient Medications   Medication Sig Dispense Refill    albuterol (VENTOLIN HFA) 90 mcg/actuation inhaler Inhale 2 puffs into the lungs every 4 (four) hours as needed for Shortness of Breath or Wheezing. 18 g 1    b complex vitamins capsule Take 1 capsule by mouth once daily.      bumetanide (BUMEX) 1 MG tablet TAKE 2 TABLETS BY MOUTH 2 TIMES A DAY. 360 tablet 3    buPROPion (WELLBUTRIN XL) 150 MG TB24 tablet TAKE 1 TABLET BY MOUTH EVERY DAY 90 tablet 2    CALCIUM 500 500 mg calcium (1,250 mg) tablet       multivitamin capsule       mycophenolate (CELLCEPT) 500 mg Tab Take 1,000 mg by mouth 2 (two) times daily.      OXYGEN-AIR DELIVERY SYSTEMS MISC       potassium chloride (KLOR-CON) 10 MEQ TbSR Take 10 mEq by mouth 2 (two) times daily.      RESTASIS 0.05 % ophthalmic emulsion Apply 1 drop to eye 2 (two) times daily.  11    SYSTANE ULTRA 0.4-0.3 % Drop SMARTSI Drop(s) In Eye(s) 6 Times Daily      tadalafil (ADCIRCA) 20 mg Tab Take 40 mg by mouth once daily.       triamcinolone acetonide 0.1% (KENALOG) 0.1 % paste Apply 1 application topically daily as needed.      TYLENOL 325 mg Cap       gabapentin (NEURONTIN) 300 MG capsule Take 1 capsule (300 mg total) by mouth 2 (two) times daily. 60 capsule 11    potassium chloride SA (K-DUR,KLOR-CON) 20 MEQ tablet Take 20 mEq by mouth 2 (two) times daily.       No current facility-administered medications for this visit.       ALLERGIES:  Review of patient's allergies indicates:   Allergen Reactions    Nitrate analogues      Contraindication for pulmonary hypertension    Prednisone     Plaquenil [hydroxychloroquine] Rash       FAMILY HISTORY:  Family History   Problem Relation Age of Onset    Diabetes Mother     Heart disease Mother         open heart surgery    Diabetes Father     Diabetes Sister     Breast cancer Sister     Stroke  "Brother     Diabetes Brother     Breast cancer Maternal Aunt     Colon cancer Neg Hx     Ovarian cancer Neg Hx     Inflammatory bowel disease Neg Hx     Lupus Neg Hx     Psoriasis Neg Hx     Rheum arthritis Neg Hx        SOCIAL HISTORY:  Social History     Tobacco Use    Smoking status: Former     Packs/day: 3.00     Years: 5.00     Pack years: 15.00     Types: Cigarettes     Quit date: 1995     Years since quittin.5    Smokeless tobacco: Never    Tobacco comments:     Book keeper now was    Substance Use Topics    Alcohol use: Yes     Comment: occasional-wine    Drug use: No       Review of Systems:  Gen: no fever, no chills, no generalized feeling of weakness   HEENT: no double vision, no blurred vision, no eye pain, no eye exudates. no nasal congestion,   no traumatic injury of head, no neck pain, no neck stiffness. no photophobia or phonophobia at this time. ?    Heart: no chest pain, no SOB    Lungs: no SOB, no cough    MSK: no weakness of legs, intact ROM    ABD: no abd pain, no N/V/D/C, no difficulty with defecation.    Extremities: (+) leg pain, no edema.       Objective:     Vitals:    23 1301   BP: (!) 103/59   Pulse: 70   Weight: 93.4 kg (206 lb)   Height: 5' 9" (1.753 m)       General: female in NAD, alert and awake, Aox3, well groomed. ?    ? ?    HEENT: Head is NC/AT EOMI, pupil size: 4 mm B/L, no nystagmus noted; hearing grossly intact b/l. Mucous membrane moist, uvula midline, no pharyngeal erythema, exudates or discharges.      Neck: Supple. no nuchal rigidity.      Cardiovascular: well perfused, no cyanosis        Respiratory: Symmetric chest rise noted       Musculoskeletal: Muscle tone noted to be adequate for patient age, muscle mass is WNL. No spontaneous movements or fasciculations noted during this examination.       Extremities:  Mild pedal edema but compression stockings in place or calf tenderness. No cogwheel rigidity noted on B/L UE extremities.       Neurological " "Examination.    Mental status: AA&O x3; Affect/mood is euthymic/congruent; no aphasia noted during examination. Patient answers simple questions appropriately & follows simple commands; no dysarthria or expressive aphasia; no marilin-neglect or extinction. Vocabulary/word finding: excellent.       Cranial Nerves: II-XII grossly intact. visual fields intact to confrontation, EOMI, no nystagmus, PERRLA,?facial muscles symmetric and no facial droop noted, patient hearing is grossly intact b/l, ?facial sensation to sharp and light touch grossly intact B/L. Patient smiles, frowns, closes eyes ?forcefully uneventfully. Uvula midline and no difficulty with pronunciation. No SCM/trapezius/muscle of mastication weakness noted B/L. Patient has adequate control of tongue and may protrude it and move it adequately.       Muscle Function: Tone WNL and Muscle bulk WNL. Left elbow flexors/extensors (5/5), Left shoulder (5/5); left Finger flexor/extensors (5/5). Right elbow flexors/extensors (5/5). Right shoulder abduction/flexion (5/5), Right finger flexors/extensors (5/5). Left LE hip flexion/extension (5/5), Left Knee flexion/extension (5/5) and Left ankle flexion/extension/Eversion/inversion (5/5). Right LE hip flexion/extension (5/5), Right Knee flexion/extension (5/5) and ankle flexion/extension/Eversion/inversion (5/5).       Sensory: ?Moderately decreased vibratory sensation at the great toe and ankles, seemingly normal at the knees.     Reflexes: Left and Right biceps, triceps, brachioradialis are 2+/4. patellar 1+/4 on Left and 1+/4 on Right, B/L Achilles 0+/4     Coordination: no dysmetria (finger to nose negative)     Gait: adequate casual gait with stride length and arm swing WNL.  Quite stable without the assistance of cane or walker    OTHER:  08/26/2021 x-ray lumbar spine:   "FINDINGS:  S-type curve to the visualized spine, to the left in the lower thoracic and upper lumbar region and to the right in the lower lumbar " "region.  There is a 20-30% upper endplate compression abnormality of the L1 vertebral segment, age indeterminate, but new to earlier CT.  There is better preserved posterior vertebral body height and no retropulsion or fragmentation at this compression fracture.  Elsewhere, preserved vertebral body heights and pedicles.  Multilevel vertebral body anterolateral end plate osteophytes.  Moderate to severe disc narrowing and vacuum phenomenon as well as opposing endplate sclerosis at the L4-L5 level.  Other disc space levels appear preserved.  Facet arthropathy L4-L5 and L5-S1 levels.  No spondylolysis or spondylolisthesis.  Intact right and left SI joints and visualized hip joint spaces.  Some calcifications per project in a right paravertebral location about the L1-L2 level, likely vascular.  Impression:  1. 20-30% age indeterminate upper endplate compression abnormality L1 but appearing intervally developed to chest CT from March 3, 2020.  If important to further date this, could consider MR.  2. Scoliosis and degenerative changes as described."    Report from MRI IAC/temporal bone with/without contrast 10/03/2022 reviewed at length; describes minimal increase in size in left acoustic neuroma.  Notes stable appearance of brain with atrophy and chronic microvascular changes.    Assessment:   Bharti Woodard is a 72 y.o. female presenting for evaluation of lower extremity complaints     Patient's description of burning/pins/needles longstanding in the distal lower extremities typical for peripheral polyneuropathy.  Takes a daily multivitamin.  Risk factors for inflammatory etiology include Sjogren's.  Over the last couple of years, the patient has seen progression in symptoms as described in HPI.  We will initiate EMG/NCV for grading and further case definition.    Patient describes radicular pattern of discomfort in the proximal left lower extremity with exacerbations when climbing stairs and other activities typical for " these complaints.  Given the presence of degenerative changes and compression fracture on x-ray in the past, we will initiate CT lumbar spine for further case definition.    Discussed benefits, risks, side effects, alternatives to low-dose gabapentin and agreed to proceed with a trial of the medication for treatment of neuropathic pain.    Known history of schwannoma followed by Neurosurgery with yearly imaging; due for next appointment/imaging session in October of this year.  No changes in hearing.    Discussed the potential benefit of initiating physical therapy.  After thorough discussion, patient expressed a desire to get results of CT lumbar spine and EMG before proceeding with this option.  I support her decision and we will continue to address this topic at follow-up.    Plan:     Problem List Items Addressed This Visit          Neuro    Inflammatory neuropathy    Relevant Medications    gabapentin (NEURONTIN) 300 MG capsule       ENT    Left acoustic neuroma       Cardiac/Vascular    Venous insufficiency of leg       Immunology/Multi System    Sjoegren syndrome    Overview     IMO Regulatory Load October 2019          Other Visit Diagnoses       Lumbar radiculopathy    -  Primary    Relevant Medications    gabapentin (NEURONTIN) 300 MG capsule    Other Relevant Orders    CT Lumbar Spine Without Contrast    EMG W/ ULTRASOUND AND NERVE CONDUCTION TEST 2 Extremities    Peripheral polyneuropathy        Relevant Medications    gabapentin (NEURONTIN) 300 MG capsule    Other Relevant Orders    EMG W/ ULTRASOUND AND NERVE CONDUCTION TEST 2 Extremities    Dorsalgia, unspecified        Relevant Orders    CT Lumbar Spine Without Contrast          - start: Gabapentin 300 mg b.i.d.; patient was also told that she could take this medicine p.r.n. up to twice daily and contact our office if she experienced any side effects or if other concerns arise   - continue close follow-up with Rheumatology for chronic management of  Sjogren's  - EMG/NCV  - CT lumbar spine  - recommend initiating physical therapy; we will proceed when patient is ready  - continue routine follow-up yearly with Neurosurgery regarding acoustic schwannoma  - continue use of compression stocking and daily elevation of the legs given hx of documented vascular insufficiency   - follow-up in 2 months or sooner as needed    I spent a total of 60 minutes on the day of the visit. This includes face to face time and non-face to face time preparing to see the patient (eg, review of tests), obtaining and/or reviewing separately obtained history, documenting clinical information in the electronic or other health record, independently interpreting results and communicating results to the patient/family/caregiver, or care coordinator.    A dictation device was used to produce this document. Use of such devices sometimes results in grammatical errors or replacement of words that sound similarly.    Elijah Davis, DO

## 2023-08-02 ENCOUNTER — HOSPITAL ENCOUNTER (OUTPATIENT)
Dept: RADIOLOGY | Facility: HOSPITAL | Age: 73
Discharge: HOME | End: 2023-08-02
Attending: PSYCHIATRY & NEUROLOGY
Payer: MEDICARE

## 2023-08-02 DIAGNOSIS — M54.16 LUMBAR RADICULOPATHY: ICD-10-CM

## 2023-08-02 DIAGNOSIS — M54.9 DORSALGIA, UNSPECIFIED: ICD-10-CM

## 2023-08-02 PROCEDURE — 72131 CT LUMBAR SPINE WITHOUT CONTRAST: ICD-10-PCS | Mod: 26,,, | Performed by: INTERNAL MEDICINE

## 2023-08-02 PROCEDURE — 72131 CT LUMBAR SPINE W/O DYE: CPT | Mod: 26,,, | Performed by: INTERNAL MEDICINE

## 2023-08-02 PROCEDURE — 72131 CT LUMBAR SPINE W/O DYE: CPT | Mod: TC

## 2023-08-10 ENCOUNTER — PROCEDURE VISIT (OUTPATIENT)
Dept: NEUROLOGY | Facility: CLINIC | Age: 73
End: 2023-08-10
Payer: MEDICARE

## 2023-08-10 DIAGNOSIS — M79.604 PAIN IN BOTH LOWER EXTREMITIES: Primary | ICD-10-CM

## 2023-08-10 DIAGNOSIS — R20.0 NUMBNESS OF LOWER LIMB: ICD-10-CM

## 2023-08-10 DIAGNOSIS — M54.16 LUMBAR RADICULOPATHY: ICD-10-CM

## 2023-08-10 DIAGNOSIS — M79.605 PAIN IN BOTH LOWER EXTREMITIES: Primary | ICD-10-CM

## 2023-08-10 DIAGNOSIS — G62.9 PERIPHERAL POLYNEUROPATHY: ICD-10-CM

## 2023-08-10 PROCEDURE — 99213 PR OFFICE/OUTPT VISIT, EST, LEVL III, 20-29 MIN: ICD-10-PCS | Mod: 25,S$GLB,, | Performed by: PSYCHIATRY & NEUROLOGY

## 2023-08-10 PROCEDURE — 95886 MUSC TEST DONE W/N TEST COMP: CPT | Mod: S$GLB,,, | Performed by: PSYCHIATRY & NEUROLOGY

## 2023-08-10 PROCEDURE — 95910 PR NERVE CONDUCTION STUDY; 7-8 STUDIES: ICD-10-PCS | Mod: S$GLB,,, | Performed by: PSYCHIATRY & NEUROLOGY

## 2023-08-10 PROCEDURE — 95886 PR EMG COMPLETE, W/ NERVE CONDUCTION STUDIES, 5+ MUSCLES: ICD-10-PCS | Mod: S$GLB,,, | Performed by: PSYCHIATRY & NEUROLOGY

## 2023-08-10 PROCEDURE — 99213 OFFICE O/P EST LOW 20 MIN: CPT | Mod: 25,S$GLB,, | Performed by: PSYCHIATRY & NEUROLOGY

## 2023-08-10 PROCEDURE — 95910 NRV CNDJ TEST 7-8 STUDIES: CPT | Mod: S$GLB,,, | Performed by: PSYCHIATRY & NEUROLOGY

## 2023-08-10 NOTE — PROCEDURES
EMG W/ ULTRASOUND AND NERVE CONDUCTION TEST 2 Extremities    Date/Time: 8/10/2023 8:00 AM    Performed by: Johnny Biggs MD  Authorized by: Elijah Davis DO [facility] [address] Neurology    Subjective:       Patient ID: Bharti Woodard is a 72 y.o. female here for a EMG focused evaluation for leg pain. Previous visits and diagnostic evaluation has been reviewed.  Patient describes several years of progressive numbness and tingling in bilateral lower extremities.  She describes a tightness type sensation involving the lateral lower legs.  She also describes burning and tingling in the feet which is worse towards the end of the day.  She has a strong family history of diabetes.  Symptoms have been progressively worsening and severe at times.   HPI  Review of patient's allergies indicates:   Allergen Reactions    Nitrate analogues      Contraindication for pulmonary hypertension    Prednisone     Plaquenil [hydroxychloroquine] Rash      There were no vitals filed for this visit.   Chief Complaint: No chief complaint on file.    Past Medical History:   Diagnosis Date    Depression     ILD (interstitial lung disease)     Pulmonary hypertension     Sjoegren syndrome       Social History     Socioeconomic History    Marital status:    Tobacco Use    Smoking status: Former     Current packs/day: 0.00     Average packs/day: 3.0 packs/day for 5.0 years (15.0 ttl pk-yrs)     Types: Cigarettes     Start date: 1990     Quit date: 1995     Years since quittin.6    Smokeless tobacco: Never    Tobacco comments:     Book keeper now was    Substance and Sexual Activity    Alcohol use: Yes     Comment: occasional-wine    Drug use: No    Sexual activity: Not Currently     Birth control/protection: Surgical      Review of Systems:   No Fever  No SOB  No vomiting  No visual disturbance      Objective:      Physical Exam     Constitutional: Patient appears well-nourished.   Head: Normocephalic and atraumatic.   Mouth/Throat: Oropharynx is clear and moist.   Pulmonary/Chest: Effort normal.   Abdominal: Soft.   Skin: Skin is warm and dry.      General:  Patient is alert and cooperative.  Affect:  Patient is appropriate to surroundings and environment.  Language:  Speech is fluent.  HEENT:  There are no outward signs of trauma to head or face.  Cranial Nerves:  Pupils are equal round and reactive to light. Extra-ocular movements are intact. Face, tongue, and palate are  symmetrical.  Motor:  Patient exhibits normal strength testing in bilateral proximal and distal lower extremities.  Reflexes:  Symmetrical in bilateral lower extremities at the knees.  Absent ankles.  Gait:  Ambulation is independent without use of cane or walker without signs of ataxia or circumduction.  Cerebellar:  Normal finger to nose testing without dysmetria.  Sensory:  Intact to sensory modalities tested.  Musculoskeletal:  There is no severe tenderness to palpation and manipulation of lumbar spine region.   Assessment:       We reviewed and discussed at length results of EMG performed today notable only for a very mild axonal sensorimotor polyneuropathy of unclear etiology without significant evidence of lumbar radiculopathy. These findings are available via media section of chart review.   1. Pain in both lower extremities    2. Peripheral polyneuropathy    3. Lumbar radiculopathy    4. Numbness of lower limb              Plan:       We discussed treatment options at length. Recommend patient keep appointment with referring provider to consider a thorough search for etiologies for neuropathy.       Because the patient has a strong family history of diabetes we had a long discussion about the need for vigilance for the possibility of diabetes.  I spent a total of 25 minutes on the day of the visit. This includes face to face time and non-face to face time  preparing to see the patient (eg, review of tests), obtaining and/or reviewing separately obtained history, documenting clinical information in the electronic or other health record, independently interpreting results and communicating results to the patient/family/caregiver, or care coordinator.    Johnny Biggs MD, FAAN   08/10/2023   8:54 AM       A dictation device was used to produce this document. Use of such devices sometimes results in grammatical errors or replacement of words that sound similarly.

## 2023-08-29 DIAGNOSIS — M25.552 PAIN OF LEFT HIP: Primary | ICD-10-CM

## 2023-08-30 ENCOUNTER — PATIENT MESSAGE (OUTPATIENT)
Dept: NEUROLOGY | Facility: CLINIC | Age: 73
End: 2023-08-30
Payer: MEDICARE

## 2023-09-22 ENCOUNTER — PATIENT MESSAGE (OUTPATIENT)
Dept: RHEUMATOLOGY | Facility: CLINIC | Age: 73
End: 2023-09-22
Payer: MEDICARE

## 2023-10-01 ENCOUNTER — PATIENT MESSAGE (OUTPATIENT)
Dept: NEUROLOGY | Facility: CLINIC | Age: 73
End: 2023-10-01
Payer: MEDICARE

## 2023-10-04 ENCOUNTER — LAB VISIT (OUTPATIENT)
Dept: LAB | Facility: HOSPITAL | Age: 73
End: 2023-10-04
Attending: INTERNAL MEDICINE
Payer: MEDICARE

## 2023-10-04 DIAGNOSIS — R53.83 FATIGUE, UNSPECIFIED TYPE: ICD-10-CM

## 2023-10-04 DIAGNOSIS — J84.9 ILD (INTERSTITIAL LUNG DISEASE): ICD-10-CM

## 2023-10-04 DIAGNOSIS — D84.9 IMMUNOSUPPRESSION: ICD-10-CM

## 2023-10-04 DIAGNOSIS — I27.20 PULMONARY HYPERTENSION: ICD-10-CM

## 2023-10-04 DIAGNOSIS — M35.00 SJOGREN'S SYNDROME, WITH UNSPECIFIED ORGAN INVOLVEMENT: ICD-10-CM

## 2023-10-04 LAB
ALBUMIN SERPL BCP-MCNC: 3.7 G/DL (ref 3.5–5.2)
ALP SERPL-CCNC: 82 U/L (ref 55–135)
ALT SERPL W/O P-5'-P-CCNC: 11 U/L (ref 10–44)
ANION GAP SERPL CALC-SCNC: 9 MMOL/L (ref 8–16)
AST SERPL-CCNC: 15 U/L (ref 10–40)
BASOPHILS # BLD AUTO: 0.01 K/UL (ref 0–0.2)
BASOPHILS NFR BLD: 0.2 % (ref 0–1.9)
BILIRUB SERPL-MCNC: 0.4 MG/DL (ref 0.1–1)
BUN SERPL-MCNC: 17 MG/DL (ref 8–23)
C3 SERPL-MCNC: 137 MG/DL (ref 50–180)
C4 SERPL-MCNC: 31 MG/DL (ref 11–44)
CALCIUM SERPL-MCNC: 9.1 MG/DL (ref 8.7–10.5)
CHLORIDE SERPL-SCNC: 97 MMOL/L (ref 95–110)
CO2 SERPL-SCNC: 32 MMOL/L (ref 23–29)
CREAT SERPL-MCNC: 0.8 MG/DL (ref 0.5–1.4)
CRP SERPL-MCNC: 8.4 MG/L (ref 0–8.2)
DIFFERENTIAL METHOD: ABNORMAL
EOSINOPHIL # BLD AUTO: 0.1 K/UL (ref 0–0.5)
EOSINOPHIL NFR BLD: 1.9 % (ref 0–8)
ERYTHROCYTE [DISTWIDTH] IN BLOOD BY AUTOMATED COUNT: 12.5 % (ref 11.5–14.5)
ERYTHROCYTE [SEDIMENTATION RATE] IN BLOOD BY PHOTOMETRIC METHOD: 35 MM/HR (ref 0–36)
EST. GFR  (NO RACE VARIABLE): >60 ML/MIN/1.73 M^2
GLUCOSE SERPL-MCNC: 91 MG/DL (ref 70–110)
HCT VFR BLD AUTO: 39.5 % (ref 37–48.5)
HGB BLD-MCNC: 12.2 G/DL (ref 12–16)
IMM GRANULOCYTES # BLD AUTO: 0.01 K/UL (ref 0–0.04)
IMM GRANULOCYTES NFR BLD AUTO: 0.2 % (ref 0–0.5)
LYMPHOCYTES # BLD AUTO: 1.4 K/UL (ref 1–4.8)
LYMPHOCYTES NFR BLD: 25.8 % (ref 18–48)
MCH RBC QN AUTO: 32 PG (ref 27–31)
MCHC RBC AUTO-ENTMCNC: 30.9 G/DL (ref 32–36)
MCV RBC AUTO: 104 FL (ref 82–98)
MONOCYTES # BLD AUTO: 0.5 K/UL (ref 0.3–1)
MONOCYTES NFR BLD: 10 % (ref 4–15)
NEUTROPHILS # BLD AUTO: 3.3 K/UL (ref 1.8–7.7)
NEUTROPHILS NFR BLD: 61.9 % (ref 38–73)
NRBC BLD-RTO: 0 /100 WBC
PLATELET # BLD AUTO: 234 K/UL (ref 150–450)
PMV BLD AUTO: 9.9 FL (ref 9.2–12.9)
POTASSIUM SERPL-SCNC: 3.4 MMOL/L (ref 3.5–5.1)
PROT SERPL-MCNC: 7 G/DL (ref 6–8.4)
RBC # BLD AUTO: 3.81 M/UL (ref 4–5.4)
SODIUM SERPL-SCNC: 138 MMOL/L (ref 136–145)
WBC # BLD AUTO: 5.32 K/UL (ref 3.9–12.7)

## 2023-10-04 PROCEDURE — 86225 DNA ANTIBODY NATIVE: CPT | Performed by: INTERNAL MEDICINE

## 2023-10-04 PROCEDURE — 85025 COMPLETE CBC W/AUTO DIFF WBC: CPT | Performed by: INTERNAL MEDICINE

## 2023-10-04 PROCEDURE — 80053 COMPREHEN METABOLIC PANEL: CPT | Performed by: INTERNAL MEDICINE

## 2023-10-04 PROCEDURE — 36415 COLL VENOUS BLD VENIPUNCTURE: CPT | Mod: PO | Performed by: INTERNAL MEDICINE

## 2023-10-04 PROCEDURE — 86160 COMPLEMENT ANTIGEN: CPT | Performed by: INTERNAL MEDICINE

## 2023-10-04 PROCEDURE — 85652 RBC SED RATE AUTOMATED: CPT | Performed by: INTERNAL MEDICINE

## 2023-10-04 PROCEDURE — 86140 C-REACTIVE PROTEIN: CPT | Performed by: INTERNAL MEDICINE

## 2023-10-04 PROCEDURE — 86160 COMPLEMENT ANTIGEN: CPT | Mod: 59 | Performed by: INTERNAL MEDICINE

## 2023-10-05 ENCOUNTER — PATIENT MESSAGE (OUTPATIENT)
Dept: RHEUMATOLOGY | Facility: CLINIC | Age: 73
End: 2023-10-05
Payer: MEDICARE

## 2023-10-05 LAB — DSDNA AB SER-ACNC: NORMAL [IU]/ML

## 2023-10-23 PROBLEM — M54.16 LUMBAR RADICULOPATHY: Status: ACTIVE | Noted: 2023-10-23

## 2023-10-23 PROBLEM — F32.0 CURRENT MILD EPISODE OF MAJOR DEPRESSIVE DISORDER WITHOUT PRIOR EPISODE: Status: ACTIVE | Noted: 2023-10-23

## 2023-11-08 ENCOUNTER — OFFICE VISIT (OUTPATIENT)
Dept: NEUROLOGY | Facility: CLINIC | Age: 73
End: 2023-11-08
Payer: MEDICARE

## 2023-11-08 VITALS
HEIGHT: 69 IN | HEART RATE: 79 BPM | SYSTOLIC BLOOD PRESSURE: 95 MMHG | BODY MASS INDEX: 29.33 KG/M2 | WEIGHT: 198 LBS | DIASTOLIC BLOOD PRESSURE: 61 MMHG

## 2023-11-08 DIAGNOSIS — M54.16 LUMBAR RADICULOPATHY: ICD-10-CM

## 2023-11-08 DIAGNOSIS — M79.604 PAIN IN BOTH LOWER EXTREMITIES: ICD-10-CM

## 2023-11-08 DIAGNOSIS — M79.605 PAIN IN BOTH LOWER EXTREMITIES: ICD-10-CM

## 2023-11-08 DIAGNOSIS — G62.9 PERIPHERAL POLYNEUROPATHY: Primary | ICD-10-CM

## 2023-11-08 PROCEDURE — 3288F FALL RISK ASSESSMENT DOCD: CPT | Mod: CPTII,S$GLB,, | Performed by: PSYCHIATRY & NEUROLOGY

## 2023-11-08 PROCEDURE — 1160F PR REVIEW ALL MEDS BY PRESCRIBER/CLIN PHARMACIST DOCUMENTED: ICD-10-PCS | Mod: CPTII,S$GLB,, | Performed by: PSYCHIATRY & NEUROLOGY

## 2023-11-08 PROCEDURE — 1101F PR PT FALLS ASSESS DOC 0-1 FALLS W/OUT INJ PAST YR: ICD-10-PCS | Mod: CPTII,S$GLB,, | Performed by: PSYCHIATRY & NEUROLOGY

## 2023-11-08 PROCEDURE — 99999 PR PBB SHADOW E&M-EST. PATIENT-LVL IV: CPT | Mod: PBBFAC,,, | Performed by: PSYCHIATRY & NEUROLOGY

## 2023-11-08 PROCEDURE — 1160F RVW MEDS BY RX/DR IN RCRD: CPT | Mod: CPTII,S$GLB,, | Performed by: PSYCHIATRY & NEUROLOGY

## 2023-11-08 PROCEDURE — 3008F BODY MASS INDEX DOCD: CPT | Mod: CPTII,S$GLB,, | Performed by: PSYCHIATRY & NEUROLOGY

## 2023-11-08 PROCEDURE — 3074F PR MOST RECENT SYSTOLIC BLOOD PRESSURE < 130 MM HG: ICD-10-PCS | Mod: CPTII,S$GLB,, | Performed by: PSYCHIATRY & NEUROLOGY

## 2023-11-08 PROCEDURE — 99213 PR OFFICE/OUTPT VISIT, EST, LEVL III, 20-29 MIN: ICD-10-PCS | Mod: S$GLB,,, | Performed by: PSYCHIATRY & NEUROLOGY

## 2023-11-08 PROCEDURE — 1125F PR PAIN SEVERITY QUANTIFIED, PAIN PRESENT: ICD-10-PCS | Mod: CPTII,S$GLB,, | Performed by: PSYCHIATRY & NEUROLOGY

## 2023-11-08 PROCEDURE — 3078F PR MOST RECENT DIASTOLIC BLOOD PRESSURE < 80 MM HG: ICD-10-PCS | Mod: CPTII,S$GLB,, | Performed by: PSYCHIATRY & NEUROLOGY

## 2023-11-08 PROCEDURE — 3288F PR FALLS RISK ASSESSMENT DOCUMENTED: ICD-10-PCS | Mod: CPTII,S$GLB,, | Performed by: PSYCHIATRY & NEUROLOGY

## 2023-11-08 PROCEDURE — 1159F MED LIST DOCD IN RCRD: CPT | Mod: CPTII,S$GLB,, | Performed by: PSYCHIATRY & NEUROLOGY

## 2023-11-08 PROCEDURE — 3078F DIAST BP <80 MM HG: CPT | Mod: CPTII,S$GLB,, | Performed by: PSYCHIATRY & NEUROLOGY

## 2023-11-08 PROCEDURE — 99999 PR PBB SHADOW E&M-EST. PATIENT-LVL IV: ICD-10-PCS | Mod: PBBFAC,,, | Performed by: PSYCHIATRY & NEUROLOGY

## 2023-11-08 PROCEDURE — 3074F SYST BP LT 130 MM HG: CPT | Mod: CPTII,S$GLB,, | Performed by: PSYCHIATRY & NEUROLOGY

## 2023-11-08 PROCEDURE — 99213 OFFICE O/P EST LOW 20 MIN: CPT | Mod: S$GLB,,, | Performed by: PSYCHIATRY & NEUROLOGY

## 2023-11-08 PROCEDURE — 1125F AMNT PAIN NOTED PAIN PRSNT: CPT | Mod: CPTII,S$GLB,, | Performed by: PSYCHIATRY & NEUROLOGY

## 2023-11-08 PROCEDURE — 3008F PR BODY MASS INDEX (BMI) DOCUMENTED: ICD-10-PCS | Mod: CPTII,S$GLB,, | Performed by: PSYCHIATRY & NEUROLOGY

## 2023-11-08 PROCEDURE — 1159F PR MEDICATION LIST DOCUMENTED IN MEDICAL RECORD: ICD-10-PCS | Mod: CPTII,S$GLB,, | Performed by: PSYCHIATRY & NEUROLOGY

## 2023-11-08 PROCEDURE — 1101F PT FALLS ASSESS-DOCD LE1/YR: CPT | Mod: CPTII,S$GLB,, | Performed by: PSYCHIATRY & NEUROLOGY

## 2023-11-08 RX ORDER — GABAPENTIN 300 MG/1
300 CAPSULE ORAL 3 TIMES DAILY
Qty: 90 CAPSULE | Refills: 11 | Status: SHIPPED | OUTPATIENT
Start: 2023-11-08 | End: 2024-11-07

## 2023-11-08 NOTE — PROGRESS NOTES
[facility] NEUROLOGY  [facility], [place]    Date: 11/8/23  Patient Name: Bharti Woodard   MRN: [id number]   PCP: Nithin Edwards  Referring Provider: No ref. provider found    Chief Complaint:  Peripheral polyneuropathy, neurogenic claudication left lower extremity pain  Subjective:     11/08/23:  Presents for routine follow-up.  Symptoms are largely unchanged over previous encounter.  Continues to have pain in the bilateral lower extremities.  Similar examples as last visit.      Patient has been taking gabapentin 300 mg once daily and very inconsistently.  The patient shares that she read on [employer] that the medication could be addictive and was therefore resistant to taking it very often.  Actually was avoiding it.  We immediately had a long conversation about this medication and its safety profile.  She is able to highlight that when gabapentin is taken, she experiences greater than 1 day of complete symptom freedom with the pain eventually comes back.  Has experienced mild sleepiness or drowsiness with some doses but not consistently.      No new neurological complaints otherwise.     =====================================================  07/27/2023 HPI:   Ms. Bharti Woodard is a 73 y.o. right-handed female presenting for evaluation of lower extremity complaints.      Patient states that she is been dealing with neuropathy in the feet/lower extremities for many years, greater than 5 years.  Symptoms have included burning/pins/needles especially in the bottoms of the feet.  Over the years, she has slowly developed a tightening sensation that occurs over the top of the foot and in the bilateral lower legs.  Somewhat more bothersome over the last year.  Has never been prescribed medications to take orally for the complaint.  Notes that she was prescribed a topical agent by podiatry but never received it.    Also describes a persistent tightness and mild pain that radiates from the left buttocks and  hip down the lateral portion of the leg past the level of the knee.  Exacerbated by walking and exercise.  In the same general area, the patient experiences actual pain when attempting to climb stairs and lead with the left leg.  Has had x-ray lumbar spine completed in August 2021 which we reviewed together during today's encounter.  Chronic L1 compression fracture was identified at the time.  Various other mild degenerative changes.    No recent falls or injuries.  Participates in light Pilates which helps with mobility and keeping previous sciatica pains at [place].  She was able to clearly delineate previous experiences with sciatica from the left lateral leg complaints described today.    Confidently denies any changes in strength in the lower extremities.  No bowel/bladder incontinence or saddle anesthesia.    History of Sjogren's.  Follows with Rheumatology.  On chronic immunosuppressants.  History of pulmonary pulmonary hypertension, interstitial lung disease.    History of venous insufficiency, peripheral vascular disease affecting the lower extremities.  Could be contributing to some of the heaviness described after mild-to-moderate exercise.    Known history of left-sided schwannoma with no hearing complaints.  Follows with Neurosurgery with planned yearly MRI to monitor for growth.  No new hearing complaints.  =====================================================  CURRENT MEDS:  Current Outpatient Medications   Medication Sig Dispense Refill    albuterol (VENTOLIN HFA) 90 mcg/actuation inhaler Inhale 2 puffs into the lungs every 4 (four) hours as needed for Shortness of Breath or Wheezing. 18 g 1    b complex vitamins capsule Take 1 capsule by mouth once daily.      bumetanide (BUMEX) 1 MG tablet TAKE 2 TABLETS BY MOUTH 2 TIMES A DAY. 360 tablet 3    buPROPion (WELLBUTRIN XL) 150 MG TB24 tablet TAKE 1 TABLET BY MOUTH EVERY DAY 90 tablet 2    CALCIUM 500 500 mg calcium (1,250 mg) tablet       multivitamin  "capsule       mycophenolate (CELLCEPT) 500 mg Tab Take 1,000 mg by mouth 2 (two) times daily.      OXYGEN-AIR DELIVERY SYSTEMS MISC       RESTASIS 0.05 % ophthalmic emulsion Apply 1 drop to eye 2 (two) times daily.  11    SYSTANE ULTRA 0.4-0.3 % Drop SMARTSI Drop(s) In Eye(s) 6 Times Daily      tadalafil (ADCIRCA) 20 mg Tab Take 40 mg by mouth once daily.       triamcinolone acetonide 0.1% (KENALOG) 0.1 % paste Apply 1 application topically daily as needed.      TYLENOL 325 mg Cap       gabapentin (NEURONTIN) 300 MG capsule Take 1 capsule (300 mg total) by mouth 3 (three) times daily. 90 capsule 11    potassium chloride (KLOR-CON) 10 MEQ TbSR Take 10 mEq by mouth 2 (two) times daily.      potassium chloride SA (K-DUR,KLOR-CON) 20 MEQ tablet Take 20 mEq by mouth 2 (two) times daily.      RSVPreF3 antigen-AS01E, PF, (AREXVY) 120 mcg/0.5 mL SusR vaccine Inject 0.5 mLs into the muscle once. for 1 dose 1 each 0     No current facility-administered medications for this visit.       ALLERGIES:  Review of patient's allergies indicates:   Allergen Reactions    Nitrate analogues      Contraindication for pulmonary hypertension    Prednisone     Plaquenil [hydroxychloroquine] Rash        Objective:     Vitals:    23 1346   BP: 95/61   Pulse: 79   Weight: 89.8 kg (198 lb)   Height: 5' 9" (1.753 m)     General: female in NAD, alert and awake, Aox3, well groomed. ?    ? ?    Neurological Examination.    Mental status: AA&O x3; Affect/mood is euthymic/congruent; no aphasia      Cranial Nerves: II-XII grossly intact bilaterally      Muscle Function:  Full and symmetric use of bilateral upper and lower extremities against gravity      Gait: adequate casual gait with stride length and arm swing WNL.  No pathological patterns.  No need for assistance.    OTHER:  2021 x-ray lumbar spine:   "FINDINGS:  S-type curve to the visualized spine, to the left in the lower thoracic and upper lumbar region and to the right in the " "lower lumbar region.  There is a 20-30% upper endplate compression abnormality of the L1 vertebral segment, age indeterminate, but new to earlier CT.  There is better preserved posterior vertebral body height and no retropulsion or fragmentation at this compression fracture.  Elsewhere, preserved vertebral body heights and pedicles.  Multilevel vertebral body anterolateral end plate osteophytes.  Moderate to severe disc narrowing and vacuum phenomenon as well as opposing endplate sclerosis at the L4-L5 level.  Other disc space levels appear preserved.  Facet arthropathy L4-L5 and L5-S1 levels.  No spondylolysis or spondylolisthesis.  Intact right and left SI joints and visualized hip joint spaces.  Some calcifications per project in a right paravertebral location about the L1-L2 level, likely vascular.  Impression:  1. 20-30% age indeterminate upper endplate compression abnormality L1 but appearing intervally developed to chest CT from March 3, 2020.  If important to further date this, could consider MR.  2. 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  Notes stable appearance of brain with atrophy and chronic microvascular changes.    Assessment:   Bharti Woodard is a 73 y.o. female presenting for follow-up regarding lower extremity complaints.  Patient has been actively avoiding taking gabapentin over fears of potential addiction.  We reviewed the safety profile of this medication and her personal experience with it.  It seems that when the medication is utilize, the patient has fantastic results.  She agreed to proceed with an adequate trial of the medication moving forward and communicate with our office over the next couple of weeks regarding whether prescribed dose was sufficient.  She was once again reminded that she can start " off with 1 pill per day and gradually move up to twice daily or even 3 times daily as needed/tolerated.    EMG/NCV that was completed at previous encounter found evidence of mild peripheral polyneuropathy which obviously does not explain the extent of her complaints.    Given the examples provided today of exacerbations in pain when walking up steps leading with the left leg, suspicion for lumbar radiculopathy.  We will initiate PT/OT evaluation and treatment and follow-up routinely.    Plan:     Problem List Items Addressed This Visit          Neuro    Lumbar radiculopathy    Relevant Medications    gabapentin (NEURONTIN) 300 MG capsule    Other Relevant Orders    Ambulatory referral/consult to Physical/Occupational Therapy     Other Visit Diagnoses       Peripheral polyneuropathy    -  Primary    Relevant Medications    gabapentin (NEURONTIN) 300 MG capsule    Pain in both lower extremities        Relevant Medications    gabapentin (NEURONTIN) 300 MG capsule    Other Relevant Orders    Ambulatory referral/consult to Physical/Occupational Therapy            - start: Gabapentin 300 mg t.i.d.; patient was also told that she could take this medicine p.r.n. up to twice daily and even 3 times daily if needed/tolerated  - continue close follow-up with Rheumatology for chronic management of Sjogren's  - EMG/NCV found evidence of mild peripheral polyneuropathy  - CT lumbar spine with moderate degenerative changes  - PT/OT evaluation treatment  - continue routine follow-up yearly with Neurosurgery regarding acoustic schwannoma  - continue use of compression stocking and daily elevation of the legs given hx of documented vascular insufficiency   - encouraged the patient to follow-up with our office via phone/chart message over the next few weeks regarding the effectiveness and tolerability of gabapentin    I spent a total of 20 minutes on the day of the visit. This includes face to face time and non-face to face time  preparing to see the patient (eg, review of tests), obtaining and/or reviewing separately obtained history, documenting clinical information in the electronic or other health record, independently interpreting results and communicating results to the patient/family/caregiver, or care coordinator.    A dictation device was used to produce this document. Use of such devices sometimes results in grammatical errors or replacement of words that sound similarly.    Elijah Davis, DO

## 2023-12-04 ENCOUNTER — CLINICAL SUPPORT (OUTPATIENT)
Dept: REHABILITATION | Facility: HOSPITAL | Age: 73
End: 2023-12-04
Payer: MEDICARE

## 2023-12-04 DIAGNOSIS — M79.604 PAIN IN BOTH LOWER EXTREMITIES: ICD-10-CM

## 2023-12-04 DIAGNOSIS — M53.86 DECREASED ROM OF LUMBAR SPINE: ICD-10-CM

## 2023-12-04 DIAGNOSIS — R53.1 DECREASED STRENGTH: Primary | ICD-10-CM

## 2023-12-04 DIAGNOSIS — M79.605 PAIN IN BOTH LOWER EXTREMITIES: ICD-10-CM

## 2023-12-04 DIAGNOSIS — M54.16 LUMBAR RADICULOPATHY: ICD-10-CM

## 2023-12-04 PROCEDURE — 97110 THERAPEUTIC EXERCISES: CPT | Mod: PN

## 2023-12-04 PROCEDURE — 97161 PT EVAL LOW COMPLEX 20 MIN: CPT | Mod: PN

## 2023-12-04 NOTE — PLAN OF CARE
[facility]   Physical Therapy Initial Evaluation      Name: Bharti Woodard  [facility] Number: [id number]    Therapy Diagnosis:   Encounter Diagnoses   Name Primary?    Lumbar radiculopathy     Pain in both lower extremities     Decreased strength Yes    Decreased ROM of lumbar spine         Physician: Elijah Davis DO    Physician Orders: PT Eval and Treat  Medical Diagnosis from Referral:   M54.16 (ICD-10-CM) - Lumbar radiculopathy   M79.604,M79.605 (ICD-10-CM) - Pain in both lower extremities     Evaluation Date: 12/4/2023  Authorization Period Expiration: 12/31/2023   Plan of Care Expiration: 2/2/24  Progress Note Due: 1/2/24  Date of Surgery: N/A  Visit # / Visits authorized: 1/ 1   FOTO: 0/3    Precautions: Standard, Sjoegren's Syndrome, L1 compression fracture, and mixed connective tissue disorder.    Time In: 9:10 AM  Time Out: 10:00 AM  Total Billable Time: 50 minutes (1 TE, 1 LCE)    Subjective     Date of onset: 6/2023    History of current condition - Bharti reports: has been having Bilateral lower leg pain for about a year mainly at rest either at rest or laying down. Right lower leg is better, but started having left upper and more left lower leg pain in the middle of this year. She does have some pulmonary hypertension that is managed well, and has been diagnosed with PAD. Does have some chronic BLE swelling. Denies any numbness or tingling other than her feet. Pain is more at the lest side of her lower back, later mid-lateral thigh area, and left lateral lower leg. Pain increases with putting weight on LLE from sitting, some left sided LBP with prolonged walking more than a 1/4 of a mile, and especially doing stairs. Has been diagnosed with neuropathy in her feet. Past history of Sjoegren's Syndrome, L1 compression fracture, and mixed connective tissue disorder. Was taking gabapentin that was helping with her feet pain but no improvement in her left leg pain.    Falls: none      Imagin23 Lumbar CT scan  Remote L1 compression deformity which is stable.  No new compression fracture.  Multilevel degenerative changes within the spine as further detailed above resulting in spinal canal and foraminal stenosis.    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  Prior Level of Function: independent   Current Level of Function: independent     Pain:  Current 0/10, worst 7/10, best 0/10   Location: left side of low back  Description: tightness, aching,   Aggravating Factors: placing weigh on LLE  Easing Factors: sitting down    Patients goals: to have less back and leg pain     Medical History:   Past Medical History:   Diagnosis Date    Depression     ILD (interstitial lung disease)     Pulmonary hypertension     Sjoegren syndrome        Surgical History:   Bharti Woodard  has a past surgical history that includes Lung biopsy; Cardiac catheterization (Right, 2016); Hysterectomy; Cataract surgery (Bilateral); Esophagogastroduodenoscopy (N/A, 6/15/2021); Esophagogastroduodenoscopy (N/A, 8/3/2021); Colonoscopy (N/A, 8/3/2021); and Esophageal manometry with measurement of impedance (N/A, 8/3/2021).    Medications:   Bharti has a current medication list which includes the following prescription(s): albuterol, b complex vitamins, bumetanide, bupropion, calcium 500, gabapentin, multivitamin, mycophenolate, oxygen-air delivery systems, potassium chloride, potassium chloride sa, restasis, systane ultra, tadalafil, triamcinolone acetonide 0.1%, and tylenol.    Allergies:   Review of patient's allergies indicates:   Allergen Reactions    Nitrate analogues      Contraindication for pulmonary hypertension    Prednisone     Plaquenil [hydroxychloroquine] Rash        Objective      Gait: slightly unsteady (likely balance related),   Posture: decreased lumbar lordosis  Reflexes:    Clonus: WNL   Cast's Test: negative   Babinski Test: not tested  DTR:     Right Left Comment   Patellar (L3-4) 2+ 2+    Achilles (S1) 2+ 2+    Sensation: WNL  Palpation: +tender to palpation at left greater trochanter, left glut med/min, left SIJ, L3-L5 spinous process     A/PROM and MMT:  * = left SIJ and left lower leg pain with testing  NT = Not tested  AROM: (degrees) LUMBAR   Flexion (40-50) 100%   Extension (15-20) 80%   Right side bending (~20) 90%   Left side bending  (~20) 80%   Right rotation (5-10) 85%   Left rotation (5-10) 75%     Hip  Right   Left  Pain/Dysfunction with Movement    AROM PROM MMT AROM PROM MMT    Flexion (L2,120 deg) WFL WFL 5/5 WFL WFL 5/5    Extension (20 deg) WFL WFL 4+/5 WFL WFL 3+/5    Abduction (L5, 45 deg) WFL WFL 4/5 WFL WFL 4/5    Adduction (30 deg) WFL WFL 5/5 WFL WFL 5/5    IR (30 deg) WFL WFL 5/5 WFL WFL 5/5    ER (45 deg) WFL WFL 5/5 WFL WFL 5/5       Knee  Right   Left  Pain/Dysfunction with Movement    AROM PROM MMT AROM PROM MMT    Flexion (S2, 140 deg) WFL WFL 5/5 WFL WFL 4/5    Extension (L3, 0-5 deg) WFL WFL 5/5 WFL WFL 4+/5      Ankle  Right   Left  Pain/Dysfunction with Movement    AROM PROM MMT AROM PROM MMT    Dorsiflexion (L4, 20 deg) WFL WFL 5/5 WFL WFL 4+/5 Recheck in future   Plantarflexion (S1, 50 deg) WFL WFL 5/5 WFL WFL 5/5 Recheck in future   Great toe extension (L5, 70 deg) WFL WFL 5/5 WFL WFL 5/5 Recheck in future     Lumbar Tests:  Slump test = + on left for dural tension as well, - right  Quadrant test = - Bilateral   SLR Test (L4-S3) = + left with dorsiflexion + adduction + inversion  Lumbar distraction = NT  Ely's test (L2,L3,L4) = NT  Prone Instability Test = NT  Vertical compression, elbow flexion, lumbar protective mechanism testing: NT  Arsen Test (thigh flat, 80 knee flexion) = NT  HS Length Test (80 degrees) = WNL  Leg length, hip level = WNL  Flick test = + left    Repeated Measures:  Lumbar flexion = no change in pain  Lumbar extension = slightly increased pain    Cluster for stabilization:   Age < 40,  SLR > 91,  +PIT, aberrant motion = Met 1/4    Cluster for spinal stenosis:   Janak s/s, leg > LBP, pain during walking/standing, relief upon sitting, > 49 y/o = Met 5/5    Hip Special Tests:  FADIR = + Bilateral   SHELTON (+ if knee higher than other knee) = - Bilateral   Scour Test = NT    Intake Outcome Measure for FOTO Lumbar Spine Survey    Therapist reviewed FOTO scores for Bharti Woodard on 12/4/2023.   FOTO report - see Media section or FOTO account episode details.    Intake Score: 42%       Treatment     Total Treatment time (time-based codes) separate from Evaluation: 8 minutes     Bharti received the treatments listed below:      therapeutic exercises to develop strength, endurance, ROM, flexibility, posture, and core stabilization for 8 minutes including:  Anterior/posterior pelvic tilts: 5x Bilateral   Open books: 5x Bilateral   LTRs: 5x Bilateral     manual therapy techniques: Joint mobilizations, Manual traction, Myofacial release, and Soft tissue Mobilization were applied to the: lumbar spine for 00 minutes, including:  Next: lumbar traction      Patient Education and Home Exercises     Education provided:   - prognosis and rehab protocol  - anatomy and related symptoms  - importance of home exercise program  - keep track of pain    Written Home Exercises Provided: yes. Exercises were reviewed and Bharti was able to demonstrate them prior to the end of the session.  Bharti demonstrated good  understanding of the education provided. See EMR under Patient Instructions for exercises provided during therapy sessions.    Assessment     Bharti is a 73 y.o. female referred to outpatient Physical Therapy with a medical diagnosis of Lumbar radiculopathy and Pain in both lower extremities. Patient presents with signs and symptoms of lumbar spinal stenosis and peripheral lumbar radiculopathy (+ for LLE) neuro, decreased Bilateral hip AROM with internal rotation, decreased lumbar AROM/PROM and pain/difficulty with transitions to stand with  LLE WB activities.     Patient prognosis is Good.   Patient will benefit from skilled outpatient Physical Therapy to address the deficits stated above and in the chart below, provide patient /family education, and to maximize patientt's level of independence.     Plan of care discussed with patient: Yes  Patient's spiritual, cultural and educational needs considered and patient is agreeable to the plan of care and goals as stated below:     Anticipated Barriers for therapy: Sjoegren's Syndrome, L1 compression fracture, and mixed connective tissue disorder.    Medical Necessity is demonstrated by the following  History  Co-morbidities and personal factors that may impact the plan of care [x] LOW: no personal factors / co-morbidities  [] MODERATE: 1-2 personal factors / co-morbidities  [] HIGH: 3+ personal factors / co-morbidities    Moderate / High Support Documentation:   Co-morbidities affecting plan of care: Sjoegren's Syndrome, L1 compression fracture, and mixed connective tissue disorder.    Personal Factors:   no deficits     Examination  Body Structures and Functions, activity limitations and participation restrictions that may impact the plan of care [x] LOW: addressing 1-2 elements  [] MODERATE: 3+ elements  [] HIGH: 4+ elements (please support below)    Moderate / High Support Documentation:      Clinical Presentation [x] LOW: stable  [] MODERATE: Evolving  [] HIGH: Unstable     Decision Making/ Complexity Score: low       Goals:  GOALS: Short Term Goals: 4 weeks  1. Pt will demo good TA muscle contraction for improved deep abdominal strength and lumbar stability.  2. Increase lumbar ROM to 100% of WNL in order to improve functional mobility.    3. Pt will demo good sitting/standing posture and body mechanics for improved spine health and decreased risk of future injury.  4. Pt to tolerate HEP to improve ROM and independence with ADL's.    Long Term Goals: 8 weeks  1. Report decreased low back pain without  radiculopathy to </= 2/10 for transitions to stand to increase tolerance for ADLs and increased QoL.  2. Increase strength to >/= 4/5 MMT grade for core and BLE to increase tolerance for ADL and work activities.  3. Pt to demonstrate negative SLR and/or Slump Test in order to show improved core strength and decreased nerve/dural tension.  4. Patient's goal: to have less back and leg pain  5. Pt will report at </= 36% impaired on FOTO lumbar score for low back pain disability to demonstrate decrease in disability and improvement in back pain.    Plan     Plan of care Certification: 12/4/2023 to 2/2/24.    Outpatient Physical Therapy 2 times weekly for 8 weeks to include the following interventions: Cervical/Lumbar Traction, Electrical Stimulation  , Gait Training, Manual Therapy, Moist Heat/ Ice, Neuromuscular Re-ed, Orthotic Management and Training, Patient Education, Self Care, Therapeutic Activities, and Therapeutic Exercise.     Carlos Mack, PT        Physician's Signature: _________________________________________ Date: ________________

## 2023-12-06 ENCOUNTER — CLINICAL SUPPORT (OUTPATIENT)
Dept: REHABILITATION | Facility: HOSPITAL | Age: 73
End: 2023-12-06
Payer: MEDICARE

## 2023-12-06 DIAGNOSIS — M79.604 PAIN IN BOTH LOWER EXTREMITIES: ICD-10-CM

## 2023-12-06 DIAGNOSIS — M54.16 LUMBAR RADICULOPATHY: Primary | ICD-10-CM

## 2023-12-06 DIAGNOSIS — M53.86 DECREASED ROM OF LUMBAR SPINE: ICD-10-CM

## 2023-12-06 DIAGNOSIS — R53.1 DECREASED STRENGTH: ICD-10-CM

## 2023-12-06 DIAGNOSIS — M79.605 PAIN IN BOTH LOWER EXTREMITIES: ICD-10-CM

## 2023-12-06 PROCEDURE — 97140 MANUAL THERAPY 1/> REGIONS: CPT | Mod: PN

## 2023-12-06 PROCEDURE — 97110 THERAPEUTIC EXERCISES: CPT | Mod: PN

## 2023-12-06 NOTE — PROGRESS NOTES
[facility] OUTPATIENT THERAPY AND WELLNESS   Physical Therapy Treatment Note      Name: Bharti Woodard  Clinic Number: [id number]    Therapy Diagnosis:   Encounter Diagnoses   Name Primary?    Lumbar radiculopathy Yes    Pain in both lower extremities     Decreased strength     Decreased ROM of lumbar spine      Physician: Elijah Davis DO    Visit Date: 12/6/2023    Physician Orders: PT Eval and Treat  Medical Diagnosis from Referral:   M54.16 (ICD-10-CM) - Lumbar radiculopathy   M79.604,M79.605 (ICD-10-CM) - Pain in both lower extremities      Evaluation Date: 12/4/2023  Authorization Period Expiration: 12/31/2023   Plan of Care Expiration: 2/2/24  Progress Note Due: 1/2/24  Date of Surgery: N/A  Visit # / Visits authorized: 2/ 1   FOTO: 2/3  PTA Visit #: 0/5      Precautions: Standard, Sjoegren's Syndrome, L1 compression fracture, and mixed connective tissue disorder.     Time In: 9:05 AM  Time Out: 10:00 AM  Total Billable Time: 30 minutes (1 TE, 1 MT)    Subjective     Patient reports: doing well and having less pain since eval, less leg symptoms  She was compliant with home exercise program.  Response to previous treatment: 1st after  Functional change: 1st after    Pain: 3/10  Location: left leg and lower back    Objective      Objective Measures updated at progress report unless specified.     Treatment     Bharti received the treatments listed below:      therapeutic exercises to develop strength, endurance, ROM, flexibility, posture, and core stabilization for 45 minutes including:  PPT: 20x5'' holds  LTRs: 15x Bilateral   Seated anterior/posterior pelvic tilts: 20x3'' holds  Supine anterior pelvic tilts: 20x5'' holds  Sidelying hip abduction: 2x10 left  Bridges: 2x10 double leg   Open books: 10x10'' holds Bilateral     manual therapy techniques: Joint mobilizations, Manual traction, Myofacial release, Manual Lymphatic Drainage, and Soft tissue Mobilization were applied to the: lumbar spine for 10 minutes,  including:  Hooklying lumbar traction  Single leg long axis traction    Patient Education and Home Exercises       Education provided:   - prognosis and rehab protocol  - anatomy and related symptoms  - importance of home exercise program  - keep track of pain    Written Home Exercises Provided: yes. Exercises were reviewed and Bharti was able to demonstrate them prior to the end of the session.  Bharti demonstrated good  understanding of the education provided. See Electronic Medical Record under Patient Instructions for exercises provided during therapy sessions    Assessment     Bharti is a 73 y.o. female referred to outpatient Physical Therapy with a medical diagnosis of Lumbar radiculopathy and Pain in both lower extremities. Patient presents with signs and symptoms of lumbar spinal stenosis and peripheral lumbar radiculopathy (+ for LLE) neuro, decreased Bilateral hip AROM with internal rotation, decreased lumbar AROM/PROM and pain/difficulty with transitions to stand with LLE WB activities.    Pt did well today, good muscle fatigued, decreased pain after manual, and completed all exercises well. Lateral lumbar spinal stenosis for LLE present, and no radicular symptoms by session end.     Bharti Is progressing well towards her goals.   Patient prognosis is Good.     Patient will continue to benefit from skilled outpatient physical therapy to address the deficits listed in the problem list box on initial evaluation, provide pt/family education and to maximize pt's level of independence in the home and community environment.     Patient's spiritual, cultural and educational needs considered and pt agreeable to plan of care and goals.     Anticipated barriers to physical therapy: Sjoegren's Syndrome, L1 compression fracture, and mixed connective tissue disorder.     Goals:   GOALS: Short Term Goals: 4 weeks  1. Pt will demo good TA muscle contraction for improved deep abdominal strength and lumbar stability.  2. Increase  lumbar ROM to 100% of WNL in order to improve functional mobility.    3. Pt will demo good sitting/standing posture and body mechanics for improved spine health and decreased risk of future injury.  4. Pt to tolerate HEP to improve ROM and independence with ADL's.     Long Term Goals: 8 weeks  1. Report decreased low back pain without radiculopathy to </= 2/10 for transitions to stand to increase tolerance for ADLs and increased QoL.  2. Increase strength to >/= 4/5 MMT grade for core and BLE to increase tolerance for ADL and work activities.  3. Pt to demonstrate negative SLR and/or Slump Test in order to show improved core strength and decreased nerve/dural tension.  4. Patient's goal: to have less back and leg pain  5. Pt will report at </= 36% impaired on FOTO lumbar score for low back pain disability to demonstrate decrease in disability and improvement in back pain.    Plan     Cont per POC.    Carlos Mack, PT

## 2023-12-11 ENCOUNTER — CLINICAL SUPPORT (OUTPATIENT)
Dept: REHABILITATION | Facility: HOSPITAL | Age: 73
End: 2023-12-11
Payer: MEDICARE

## 2023-12-11 DIAGNOSIS — R53.1 DECREASED STRENGTH: ICD-10-CM

## 2023-12-11 DIAGNOSIS — M79.605 PAIN IN BOTH LOWER EXTREMITIES: ICD-10-CM

## 2023-12-11 DIAGNOSIS — M53.86 DECREASED ROM OF LUMBAR SPINE: ICD-10-CM

## 2023-12-11 DIAGNOSIS — M54.16 LUMBAR RADICULOPATHY: Primary | ICD-10-CM

## 2023-12-11 DIAGNOSIS — M79.604 PAIN IN BOTH LOWER EXTREMITIES: ICD-10-CM

## 2023-12-11 PROCEDURE — 97140 MANUAL THERAPY 1/> REGIONS: CPT | Mod: PN

## 2023-12-11 PROCEDURE — 97530 THERAPEUTIC ACTIVITIES: CPT | Mod: PN

## 2023-12-11 PROCEDURE — 97110 THERAPEUTIC EXERCISES: CPT | Mod: PN

## 2023-12-11 NOTE — PROGRESS NOTES
[facility]   Physical Therapy Treatment Note      Name: Bharti Woodard  Clinic Number: [id number]    Therapy Diagnosis:   Encounter Diagnoses   Name Primary?    Lumbar radiculopathy Yes    Pain in both lower extremities     Decreased strength     Decreased ROM of lumbar spine      Physician: Elijah Davis DO    Visit Date: 12/11/2023    Physician Orders: PT Eval and Treat  Medical Diagnosis from Referral:   M54.16 (ICD-10-CM) - Lumbar radiculopathy   M79.604,M79.605 (ICD-10-CM) - Pain in both lower extremities     Evaluation Date: 12/4/2023  Authorization Period Expiration: 12/31/2023   Plan of Care Expiration: 2/2/24  Progress Note Due: 1/2/24  Date of Surgery: N/A  Visit # / Visits authorized: 3/1   FOTO: 3/3 - Next  PTA Visit #: 0/5      Precautions: Standard, Sjoegren's Syndrome, L1 compression fracture, and mixed connective tissue disorder.     Time In: 10:05 AM  Time Out: 11:00 AM  Total Billable Time: 55 minutes (2 TE, 1 TA, 1 MT)    Subjective     Patient reports: doing exercises 2x a day. Pain at left lateral calf area present.  She was compliant with home exercise program.  Response to previous treatment: 1st after  Functional change: 1st after    Pain: 5/10  Location: left leg and lower back    Objective      Objective Measures updated at progress report unless specified.     Treatment     Bharti received the treatments listed below:      therapeutic exercises to develop strength, endurance, ROM, flexibility, posture, and core stabilization for 35 minutes including:  LTRs: 10x5'' holds Bilateral   Supine anterior/posterior pelvic tilts: 20x3'' holds  Bridges: 2x15 double leg   Open books: 10x10'' holds Bilateral with 2 pillows under hips  Sidelying left sided clams: 10x5'' holds (slight left lateral calf radicular pain) - HOLD  Sidelying hip abduction: 2x10 left - HOLD    Therapeutic activities for 10 minutes including:  Double knees to chest: 20x5'' holds with ball  Leg press: 2x20  double leg, 7 plates, sled at 90-90    manual therapy techniques: Joint mobilizations, Manual traction, Myofacial release, Manual Lymphatic Drainage, and Soft tissue Mobilization were applied to the: lumbar spine for 10 minutes, including:  Hooklying lumbar traction  Single leg long axis traction    Patient Education and Home Exercises       Education provided:   - prognosis and rehab protocol  - anatomy and related symptoms  - importance of home exercise program  - keep track of pain    Written Home Exercises Provided: yes. Exercises were reviewed and Bharti was able to demonstrate them prior to the end of the session.  Bharti demonstrated good  understanding of the education provided. See Electronic Medical Record under Patient Instructions for exercises provided during therapy sessions    Assessment     Bharti is a 73 y.o. female referred to outpatient Physical Therapy with a medical diagnosis of Lumbar radiculopathy and Pain in both lower extremities. Patient presents with signs and symptoms of lumbar spinal stenosis and peripheral lumbar radiculopathy (+ for LLE) neuro, decreased Bilateral hip AROM with internal rotation, decreased lumbar AROM/PROM and pain/difficulty with transitions to stand with LLE WB activities.    Symptoms off and on more related to weight bearing after laying down. Lower lumbar spine hypermobility and upper lumbo-thoracic spine stiff. Concordant pain with right sidelying activities. Wide set hips and likely involved in symptomatic mat positions.     Bharti Is progressing well towards her goals.   Patient prognosis is Good.     Patient will continue to benefit from skilled outpatient physical therapy to address the deficits listed in the problem list box on initial evaluation, provide pt/family education and to maximize pt's level of independence in the home and community environment.     Patient's spiritual, cultural and educational needs considered and pt agreeable to plan of care and goals.      Anticipated barriers to physical therapy: Sjoegren's Syndrome, L1 compression fracture, and mixed connective tissue disorder.     Goals:   GOALS: Short Term Goals: 4 weeks  1. Pt will demo good TA muscle contraction for improved deep abdominal strength and lumbar stability.  2. Increase lumbar ROM to 100% of WNL in order to improve functional mobility.    3. Pt will demo good sitting/standing posture and body mechanics for improved spine health and decreased risk of future injury.  4. Pt to tolerate HEP to improve ROM and independence with ADL's.     Long Term Goals: 8 weeks  1. Report decreased low back pain without radiculopathy to </= 2/10 for transitions to stand to increase tolerance for ADLs and increased QoL.  2. Increase strength to >/= 4/5 MMT grade for core and BLE to increase tolerance for ADL and work activities.  3. Pt to demonstrate negative SLR and/or Slump Test in order to show improved core strength and decreased nerve/dural tension.  4. Patient's goal: to have less back and leg pain  5. Pt will report at </= 36% impaired on FOTO lumbar score for low back pain disability to demonstrate decrease in disability and improvement in back pain.    Plan     Cont per POC.    Carlos Mack, PT

## 2023-12-13 ENCOUNTER — CLINICAL SUPPORT (OUTPATIENT)
Dept: REHABILITATION | Facility: HOSPITAL | Age: 73
End: 2023-12-13
Payer: MEDICARE

## 2023-12-13 DIAGNOSIS — R53.1 DECREASED STRENGTH: ICD-10-CM

## 2023-12-13 DIAGNOSIS — M79.604 PAIN IN BOTH LOWER EXTREMITIES: ICD-10-CM

## 2023-12-13 DIAGNOSIS — M54.16 LUMBAR RADICULOPATHY: Primary | ICD-10-CM

## 2023-12-13 DIAGNOSIS — M79.605 PAIN IN BOTH LOWER EXTREMITIES: ICD-10-CM

## 2023-12-13 DIAGNOSIS — M53.86 DECREASED ROM OF LUMBAR SPINE: ICD-10-CM

## 2023-12-13 PROCEDURE — 97530 THERAPEUTIC ACTIVITIES: CPT | Mod: PN

## 2023-12-13 PROCEDURE — 97140 MANUAL THERAPY 1/> REGIONS: CPT | Mod: PN

## 2023-12-13 PROCEDURE — 97110 THERAPEUTIC EXERCISES: CPT | Mod: PN

## 2023-12-13 NOTE — PROGRESS NOTES
[facility]   Physical Therapy Treatment Note      Name: Bharti Woodard  Clinic Number: [id number]    Therapy Diagnosis:   Encounter Diagnoses   Name Primary?    Lumbar radiculopathy Yes    Pain in both lower extremities     Decreased strength     Decreased ROM of lumbar spine      Physician: Elijah Davis DO    Visit Date: 12/13/2023    Physician Orders: PT Eval and Treat  Medical Diagnosis from Referral:   M54.16 (ICD-10-CM) - Lumbar radiculopathy   M79.604,M79.605 (ICD-10-CM) - Pain in both lower extremities     Evaluation Date: 12/4/2023  Authorization Period Expiration: 12/31/2023   Plan of Care Expiration: 2/2/24  Progress Note Due: 1/2/24  Date of Surgery: N/A  Visit # / Visits authorized: 4/1   FOTO: 4/5 - Next  PTA Visit #: 0/5      Precautions: Standard, Sjoegren's Syndrome, L1 compression fracture, and mixed connective tissue disorder.     Time In: 9:05 AM  Time Out: 10:00 AM  Total Billable Time: 55 minutes (2 TE, 1 TA, 1 MT)    Subjective     Patient reports: pain mainly at night that will keep her up - left latera calf pain  She was compliant with home exercise program.  Response to previous treatment: muscle soreness  Functional change: none at this time    Pain: 5/10  Location: left leg and lower back    Objective      Objective Measures updated at progress report unless specified.     Treatment     Bharti received the treatments listed below:      therapeutic exercises to develop strength, endurance, ROM, flexibility, posture, and core stabilization for 30 minutes including:  LTRs: 10x5'' holds Bilateral   Supine anterior/posterior pelvic tilts: 20x3'' holds  Bridges: 2x15 double leg   Open books: 10x10'' holds Bilateral with 2 pillows under hips  Seated lumbar rotation: 10x5'' holds Bilateral   Left to right hip lateral glides on wall: 12x3'' holds  Sidelying left sided clams: 10x5'' holds (slight left lateral calf radicular pain) - HOLD  Sidelying hip abduction: 2x10 left -  HOLD    Therapeutic activities for 10 minutes including:  Double knees to chest: 20x5'' holds with ball  Leg press: 2x20 double leg, 7 plates, sled at 90-90    manual therapy techniques: Joint mobilizations, Manual traction, Myofacial release, Manual Lymphatic Drainage, and Soft tissue Mobilization were applied to the: lumbar spine for 15 minutes, including:  Hooklying lumbar traction  Single leg long axis traction  Left sided L4-S1 gapping  Left QL stretching    Patient Education and Home Exercises       Education provided:   - prognosis and rehab protocol  - anatomy and related symptoms  - importance of home exercise program  - keep track of pain    Written Home Exercises Provided: yes. Exercises were reviewed and Bharti was able to demonstrate them prior to the end of the session.  Bharti demonstrated good  understanding of the education provided. See Electronic Medical Record under Patient Instructions for exercises provided during therapy sessions    Assessment     Bharti is a 73 y.o. female referred to outpatient Physical Therapy with a medical diagnosis of Lumbar radiculopathy and Pain in both lower extremities. Patient presents with signs and symptoms of lumbar spinal stenosis and peripheral lumbar radiculopathy (+ for LLE) neuro, decreased Bilateral hip AROM with internal rotation, decreased lumbar AROM/PROM and pain/difficulty with transitions to stand with LLE WB activities.    Continuing with lumbar traction, concordant pain with weight bearing, resume leg press next visit, and initiated lateral glides left to right (pain with opposite direction). Limited in open books today due to some vertigo since last visit. Seated lumbar mobility training. May need vestibular rehab referral; reports strong history of BPPV.    Bharti Is progressing well towards her goals.   Patient prognosis is Good.     Patient will continue to benefit from skilled outpatient physical therapy to address the deficits listed in the problem list  box on initial evaluation, provide pt/family education and to maximize pt's level of independence in the home and community environment.     Patient's spiritual, cultural and educational needs considered and pt agreeable to plan of care and goals.     Anticipated barriers to physical therapy: Sjoegren's Syndrome, L1 compression fracture, and mixed connective tissue disorder.     Goals:   GOALS: Short Term Goals: 4 weeks  1. Pt will demo good TA muscle contraction for improved deep abdominal strength and lumbar stability.  2. Increase lumbar ROM to 100% of WNL in order to improve functional mobility.    3. Pt will demo good sitting/standing posture and body mechanics for improved spine health and decreased risk of future injury.  4. Pt to tolerate HEP to improve ROM and independence with ADL's.     Long Term Goals: 8 weeks  1. Report decreased low back pain without radiculopathy to </= 2/10 for transitions to stand to increase tolerance for ADLs and increased QoL.  2. Increase strength to >/= 4/5 MMT grade for core and BLE to increase tolerance for ADL and work activities.  3. Pt to demonstrate negative SLR and/or Slump Test in order to show improved core strength and decreased nerve/dural tension.  4. Patient's goal: to have less back and leg pain  5. Pt will report at </= 36% impaired on FOTO lumbar score for low back pain disability to demonstrate decrease in disability and improvement in back pain.    Plan     Cont per POC.    Carlos Mack, PT

## 2023-12-20 ENCOUNTER — CLINICAL SUPPORT (OUTPATIENT)
Dept: REHABILITATION | Facility: HOSPITAL | Age: 73
End: 2023-12-20
Payer: MEDICARE

## 2023-12-20 DIAGNOSIS — M79.605 PAIN IN BOTH LOWER EXTREMITIES: ICD-10-CM

## 2023-12-20 DIAGNOSIS — R53.1 DECREASED STRENGTH: ICD-10-CM

## 2023-12-20 DIAGNOSIS — M53.86 DECREASED ROM OF LUMBAR SPINE: ICD-10-CM

## 2023-12-20 DIAGNOSIS — M79.604 PAIN IN BOTH LOWER EXTREMITIES: ICD-10-CM

## 2023-12-20 DIAGNOSIS — M54.16 LUMBAR RADICULOPATHY: Primary | ICD-10-CM

## 2023-12-20 PROCEDURE — 97110 THERAPEUTIC EXERCISES: CPT | Mod: PN,CQ

## 2023-12-20 PROCEDURE — 97140 MANUAL THERAPY 1/> REGIONS: CPT | Mod: PN,CQ

## 2023-12-20 NOTE — PROGRESS NOTES
[facility]   Physical Therapy Treatment Note      Name: Bharti Woodard  Clinic Number: [id number]    Therapy Diagnosis:   Encounter Diagnoses   Name Primary?    Lumbar radiculopathy Yes    Pain in both lower extremities     Decreased strength     Decreased ROM of lumbar spine      Physician: Elijah Davis DO    Visit Date: 12/20/2023    Physician Orders: PT Eval and Treat  Medical Diagnosis from Referral:   M54.16 (ICD-10-CM) - Lumbar radiculopathy   M79.604,M79.605 (ICD-10-CM) - Pain in both lower extremities     Evaluation Date: 12/4/2023  Authorization Period Expiration: 12/31/2023   Plan of Care Expiration: 2/2/24  Progress Note Due: 1/2/24  Date of Surgery: N/A  Visit # / Visits authorized: 4/1   FOTO: 4/5 - Next  PTA Visit #: 1/5      Precautions: Standard, Sjoegren's Syndrome, L1 compression fracture, and mixed connective tissue disorder.     Time In: 9:00 AM  Time Out: 10:00 AM  Total Billable Time: 30 minutes (1 TE, 1 MT)    Subjective     Patient reports: pt agreeable to PT session  She was compliant with home exercise program.  Response to previous treatment: muscle soreness  Functional change: none at this time    Pain: /10  Location: left leg and lower back    Objective      Objective Measures updated at progress report unless specified.     Treatment     Bharti received the treatments listed below:      therapeutic exercises to develop strength, endurance, ROM, flexibility, posture, and core stabilization for 20 1:1 minutes, remaining supervised minutes including:  LTRs: 10x5'' holds Bilateral   Supine anterior/posterior pelvic tilts: 20x3'' holds  Bridges: 2x15 double leg   Open books: 10x10'' holds Bilateral with 2 pillows under hips  Seated lumbar rotation: 10x5'' holds Bilateral   Left to right hip lateral glides on wall: 12x3'' holds  Sidelying left sided clams: 10x5'' holds (slight left lateral calf radicular pain) - HOLD  Sidelying hip abduction: 2x10 left -  HOLD    Therapeutic activities for supervised minutes including:  Double knees to chest: 20x5'' holds with ball  Leg press: 2x20 double leg, 7 plates, sled at 90-90    manual therapy techniques: Joint mobilizations, Manual traction, Myofacial release, Manual Lymphatic Drainage, and Soft tissue Mobilization were applied to the: lumbar spine for 10 minutes, including:  Hooklying lumbar traction  Single leg long axis traction  Left sided L4-S1 gapping  Left QL stretching    Patient Education and Home Exercises       Education provided:   - prognosis and rehab protocol  - anatomy and related symptoms  - importance of home exercise program    Written Home Exercises Provided: yes. Exercises were reviewed and Bharti was able to demonstrate them prior to the end of the session.  Bharti demonstrated good  understanding of the education provided. See Electronic Medical Record under Patient Instructions for exercises provided during therapy sessions    Assessment     Bharti is a 73 y.o. female referred to outpatient Physical Therapy with a medical diagnosis of Lumbar radiculopathy and Pain in both lower extremities. Patient presents with signs and symptoms of lumbar spinal stenosis and peripheral lumbar radiculopathy (+ for LLE) neuro, decreased Bilateral hip AROM with internal rotation, decreased lumbar AROM/PROM and pain/difficulty with transitions to stand with LLE WB activities.    Pt completed session with no reports of increased lumbar pain.     Bharti Is progressing well towards her goals.   Patient prognosis is Good.     Patient will continue to benefit from skilled outpatient physical therapy to address the deficits listed in the problem list box on initial evaluation, provide pt/family education and to maximize pt's level of independence in the home and community environment.     Patient's spiritual, cultural and educational needs considered and pt agreeable to plan of care and goals.     Anticipated barriers to physical  therapy: Sjoegren's Syndrome, L1 compression fracture, and mixed connective tissue disorder.     Goals:   GOALS: Short Term Goals: 4 weeks  1. Pt will demo good TA muscle contraction for improved deep abdominal strength and lumbar stability.  2. Increase lumbar ROM to 100% of WNL in order to improve functional mobility.    3. Pt will demo good sitting/standing posture and body mechanics for improved spine health and decreased risk of future injury.  4. Pt to tolerate HEP to improve ROM and independence with ADL's.     Long Term Goals: 8 weeks  1. Report decreased low back pain without radiculopathy to </= 2/10 for transitions to stand to increase tolerance for ADLs and increased QoL.  2. Increase strength to >/= 4/5 MMT grade for core and BLE to increase tolerance for ADL and work activities.  3. Pt to demonstrate negative SLR and/or Slump Test in order to show improved core strength and decreased nerve/dural tension.  4. Patient's goal: to have less back and leg pain  5. Pt will report at </= 36% impaired on FOTO lumbar score for low back pain disability to demonstrate decrease in disability and improvement in back pain.    Plan     Cont per POC.    Vignesh Dominique, COLBY

## 2023-12-27 ENCOUNTER — CLINICAL SUPPORT (OUTPATIENT)
Dept: REHABILITATION | Facility: HOSPITAL | Age: 73
End: 2023-12-27
Payer: MEDICARE

## 2023-12-27 DIAGNOSIS — M79.604 PAIN IN BOTH LOWER EXTREMITIES: ICD-10-CM

## 2023-12-27 DIAGNOSIS — M54.16 LUMBAR RADICULOPATHY: Primary | ICD-10-CM

## 2023-12-27 DIAGNOSIS — M79.605 PAIN IN BOTH LOWER EXTREMITIES: ICD-10-CM

## 2023-12-27 DIAGNOSIS — M53.86 DECREASED ROM OF LUMBAR SPINE: ICD-10-CM

## 2023-12-27 DIAGNOSIS — R53.1 DECREASED STRENGTH: ICD-10-CM

## 2023-12-27 PROCEDURE — 97140 MANUAL THERAPY 1/> REGIONS: CPT | Mod: PN

## 2023-12-27 PROCEDURE — 97530 THERAPEUTIC ACTIVITIES: CPT | Mod: PN

## 2023-12-27 PROCEDURE — 97110 THERAPEUTIC EXERCISES: CPT | Mod: PN

## 2023-12-27 NOTE — PROGRESS NOTES
[facility]   Physical Therapy Treatment Note      Name: Bharti Woodard  Clinic Number: [id number]    Therapy Diagnosis:   Encounter Diagnoses   Name Primary?    Lumbar radiculopathy Yes    Pain in both lower extremities     Decreased strength     Decreased ROM of lumbar spine      Physician: Elijah Davis DO    Visit Date: 12/27/2023    Physician Orders: PT Eval and Treat  Medical Diagnosis from Referral:   M54.16 (ICD-10-CM) - Lumbar radiculopathy   M79.604,M79.605 (ICD-10-CM) - Pain in both lower extremities     Evaluation Date: 12/4/2023  Authorization Period Expiration: 12/31/2023   Plan of Care Expiration: 2/2/24  Progress Note Due: 1/2/24  Date of Surgery: N/A  Visit # / Visits authorized: 1/1, 5   FOTO: 5/5 - Next  PTA Visit #: 0/5      Precautions: Standard, Sjoegren's Syndrome, L1 compression fracture, and mixed connective tissue disorder.     Time In: 10:05 AM  Time Out: 11:00 AM  Total Billable Time: 55 minutes (2 TE, 1 TA, 1 MT)    Subjective     Patient reports: no longer having left leg pain and some slight low back pain now. Feeling much better overall.   She was compliant with home exercise program.  Response to previous treatment: muscle soreness  Functional change: none at this time    Pain: 1-2/10  Location: left leg and lower back    Objective      Objective Measures updated at progress report unless specified.     Treatment     Bharti received the treatments listed below:      therapeutic exercises to develop strength, endurance, ROM, flexibility, posture, and core stabilization for 30 minutes including:  LTRs: 10x5'' holds Bilateral   Supine anterior/posterior pelvic tilts: 20x3'' holds  Bridges: 4x10 double leg   Open books: 10x10'' holds in lumbar manipulation pose  Seated end range lumbar rotation: 10x10'' holds Bilateral   Left to right hip lateral glides on wall: 15x3'' holds  Sidelying left sided clams: 10x5'' holds (slight left lateral calf radicular pain) -  HOLD  Sidelying hip abduction: 2x10 left - HOLD    Therapeutic activities for 15 minutes including:  Double knees to chest: 20x5'' holds with ball  Leg press: 2x20 double leg, 6.5 plates, sled @6    manual therapy techniques: Joint mobilizations, Manual traction, Myofacial release, Manual Lymphatic Drainage, and Soft tissue Mobilization were applied to the: lumbar spine for 10 minutes, including:  Hooklying lumbar traction  Single leg long axis traction  Left sided L4-S1 gapping  Left QL stretching    Patient Education and Home Exercises       Education provided:   - prognosis and rehab protocol  - anatomy and related symptoms  - importance of home exercise program    Written Home Exercises Provided: yes. Exercises were reviewed and Bharti was able to demonstrate them prior to the end of the session.  Bharti demonstrated good  understanding of the education provided. See Electronic Medical Record under Patient Instructions for exercises provided during therapy sessions    Assessment     Bharti is a 73 y.o. female referred to outpatient Physical Therapy with a medical diagnosis of Lumbar radiculopathy and Pain in both lower extremities. Patient presents with signs and symptoms of lumbar spinal stenosis and peripheral lumbar radiculopathy (+ for LLE) neuro, decreased Bilateral hip AROM with internal rotation, decreased lumbar AROM/PROM and pain/difficulty with transitions to stand with LLE WB activities.    Pt progressing well. Does better with lumbar mobility training with top knee flexed in lumbar pre-manipulation position. Pt nearing discharge and plan for next Friday on 1/5/24 for d/c.     Bharti Is progressing well towards her goals.   Patient prognosis is Good.     Patient will continue to benefit from skilled outpatient physical therapy to address the deficits listed in the problem list box on initial evaluation, provide pt/family education and to maximize pt's level of independence in the home and community environment.      Patient's spiritual, cultural and educational needs considered and pt agreeable to plan of care and goals.     Anticipated barriers to physical therapy: Sjoegren's Syndrome, L1 compression fracture, and mixed connective tissue disorder.     Goals:   GOALS: Short Term Goals: 4 weeks  1. Pt will demo good TA muscle contraction for improved deep abdominal strength and lumbar stability.  2. Increase lumbar ROM to 100% of WNL in order to improve functional mobility.    3. Pt will demo good sitting/standing posture and body mechanics for improved spine health and decreased risk of future injury.  4. Pt to tolerate HEP to improve ROM and independence with ADL's.     Long Term Goals: 8 weeks  1. Report decreased low back pain without radiculopathy to </= 2/10 for transitions to stand to increase tolerance for ADLs and increased QoL.  2. Increase strength to >/= 4/5 MMT grade for core and BLE to increase tolerance for ADL and work activities.  3. Pt to demonstrate negative SLR and/or Slump Test in order to show improved core strength and decreased nerve/dural tension.  4. Patient's goal: to have less back and leg pain  5. Pt will report at </= 36% impaired on FOTO lumbar score for low back pain disability to demonstrate decrease in disability and improvement in back pain.    Plan     Cont per POC.    Carlos Mack, PT

## 2024-01-03 ENCOUNTER — CLINICAL SUPPORT (OUTPATIENT)
Dept: REHABILITATION | Facility: HOSPITAL | Age: 74
End: 2024-01-03
Payer: MEDICARE

## 2024-01-03 DIAGNOSIS — M54.16 LUMBAR RADICULOPATHY: Primary | ICD-10-CM

## 2024-01-03 DIAGNOSIS — R53.1 DECREASED STRENGTH: ICD-10-CM

## 2024-01-03 DIAGNOSIS — M79.604 PAIN IN BOTH LOWER EXTREMITIES: ICD-10-CM

## 2024-01-03 DIAGNOSIS — M53.86 DECREASED ROM OF LUMBAR SPINE: ICD-10-CM

## 2024-01-03 DIAGNOSIS — M79.605 PAIN IN BOTH LOWER EXTREMITIES: ICD-10-CM

## 2024-01-03 PROCEDURE — 97530 THERAPEUTIC ACTIVITIES: CPT | Mod: PN

## 2024-01-03 PROCEDURE — 97110 THERAPEUTIC EXERCISES: CPT | Mod: PN

## 2024-01-03 NOTE — PROGRESS NOTES
[facility]   Physical Therapy Treatment Note      Name: Bharti Woodard  Clinic Number: [id number]    Therapy Diagnosis:   Encounter Diagnoses   Name Primary?    Lumbar radiculopathy Yes    Pain in both lower extremities     Decreased strength     Decreased ROM of lumbar spine      Physician: Elijah Davis,     Visit Date: 1/3/2024    Physician Orders: PT Eval and Treat  Medical Diagnosis from Referral:   M54.16 (ICD-10-CM) - Lumbar radiculopathy   M79.604,M79.605 (ICD-10-CM) - Pain in both lower extremities     Evaluation Date: 12/4/2023  Authorization Period Expiration: 12/31/2023   Plan of Care Expiration: 2/2/24  Progress Note Due: 1/2/24  Date of Surgery: N/A  Visit # / Visits authorized: 1/1, 6  FOTO: 3/3 - done today for d/c next visit  PTA Visit #: 0/5      Precautions: Standard, Sjoegren's Syndrome, L1 compression fracture, and mixed connective tissue disorder.     Time In: 10:05 AM  Time Out: 11:00 AM  Total Billable Time: 30 minutes (1 TE, 1 TA)    Subjective     Patient reports: no midline low back pain except briefly in the mornings. None this morning but did have some higher lumbar midline pain that is unusual. Pain went away within a couple of minutes though. Doing well overall. Wants to schedule one for next week. Provider not in this Friday and agreed to move Friday appointment to next Wednesday.  She was compliant with home exercise program.  Response to previous treatment: muscle soreness  Functional change: none at this time    Pain: 0/10  Location: left leg and lower back    Objective      Objective Measures updated at progress report unless specified.     Treatment     Bharti received the treatments listed below:      therapeutic exercises to develop strength, endurance, ROM, flexibility, posture, and core stabilization for 30 minutes including: FOTO  Bridges: 3x15 double leg   Open books: 12x10'' holds in lumbar manipulation pose  Sidelying modified side planks: 10x5''  holds Bilateral   Left to right hip lateral glides on wall: 15x3'' holds    Therapeutic activities for 15 minutes including:  Double knees to chest: 20x5'' holds with ball  Leg press: 2x20 double leg, 6.5 plates, sled @6    manual therapy techniques: Joint mobilizations, Manual traction, Myofacial release, Manual Lymphatic Drainage, and Soft tissue Mobilization were applied to the: lumbar spine for 10 minutes, including:  Hooklying lumbar traction  Single leg long axis traction  Left sided L4-S1 gapping  Left QL stretching    Patient Education and Home Exercises       Education provided:   - prognosis and rehab protocol  - anatomy and related symptoms  - importance of home exercise program    Written Home Exercises Provided: yes. Exercises were reviewed and Bharti was able to demonstrate them prior to the end of the session.  Bharti demonstrated good  understanding of the education provided. See Electronic Medical Record under Patient Instructions for exercises provided during therapy sessions    Assessment     Bharti is a 73 y.o. female referred to outpatient Physical Therapy with a medical diagnosis of Lumbar radiculopathy and Pain in both lower extremities. Patient presents with signs and symptoms of lumbar spinal stenosis and peripheral lumbar radiculopathy (+ for LLE) neuro, decreased Bilateral hip AROM with internal rotation, decreased lumbar AROM/PROM and pain/difficulty with transitions to stand with LLE WB activities.    Scheduled changed and planning for discharge next Wednesday on 1/10/23. Pt did very well, no signs of lumbar radiculopathy, minimal to no low back pain present other than in the mornings. Advanced lumbar loading today and give updated home exercise program next visit.    Bharti Is progressing well towards her goals.   Patient prognosis is Good.     Patient will continue to benefit from skilled outpatient physical therapy to address the deficits listed in the problem list box on initial evaluation,  provide pt/family education and to maximize pt's level of independence in the home and community environment.     Patient's spiritual, cultural and educational needs considered and pt agreeable to plan of care and goals.     Anticipated barriers to physical therapy: Sjoegren's Syndrome, L1 compression fracture, and mixed connective tissue disorder.     Goals:   GOALS: Short Term Goals: 4 weeks  1. Pt will demo good TA muscle contraction for improved deep abdominal strength and lumbar stability.  2. Increase lumbar ROM to 100% of WNL in order to improve functional mobility.    3. Pt will demo good sitting/standing posture and body mechanics for improved spine health and decreased risk of future injury.  4. Pt to tolerate HEP to improve ROM and independence with ADL's.     Long Term Goals: 8 weeks  1. Report decreased low back pain without radiculopathy to </= 2/10 for transitions to stand to increase tolerance for ADLs and increased QoL.  2. Increase strength to >/= 4/5 MMT grade for core and BLE to increase tolerance for ADL and work activities.  3. Pt to demonstrate negative SLR and/or Slump Test in order to show improved core strength and decreased nerve/dural tension.  4. Patient's goal: to have less back and leg pain  5. Pt will report at </= 36% impaired on FOTO lumbar score for low back pain disability to demonstrate decrease in disability and improvement in back pain.    Plan     Cont per POC.    Carlos Mack, PT

## 2024-01-10 ENCOUNTER — CLINICAL SUPPORT (OUTPATIENT)
Dept: REHABILITATION | Facility: HOSPITAL | Age: 74
End: 2024-01-10
Payer: MEDICARE

## 2024-01-10 DIAGNOSIS — M53.86 DECREASED ROM OF LUMBAR SPINE: ICD-10-CM

## 2024-01-10 DIAGNOSIS — M54.16 LUMBAR RADICULOPATHY: Primary | ICD-10-CM

## 2024-01-10 DIAGNOSIS — R53.1 DECREASED STRENGTH: ICD-10-CM

## 2024-01-10 DIAGNOSIS — M79.604 PAIN IN BOTH LOWER EXTREMITIES: ICD-10-CM

## 2024-01-10 DIAGNOSIS — M79.605 PAIN IN BOTH LOWER EXTREMITIES: ICD-10-CM

## 2024-01-10 PROCEDURE — 97110 THERAPEUTIC EXERCISES: CPT | Mod: PN

## 2024-01-10 PROCEDURE — 97530 THERAPEUTIC ACTIVITIES: CPT | Mod: PN

## 2024-01-10 NOTE — PROGRESS NOTES
[facility]   Physical Therapy Treatment and Discharge Note      Name: Bharti Woodard  Clinic Number: [id number]    Therapy Diagnosis:   Encounter Diagnoses   Name Primary?    Lumbar radiculopathy Yes    Pain in both lower extremities     Decreased strength     Decreased ROM of lumbar spine      Physician: Elijah Davis,     Visit Date: 1/10/2024    Physician Orders: PT Eval and Treat  Medical Diagnosis from Referral:   M54.16 (ICD-10-CM) - Lumbar radiculopathy   M79.604,M79.605 (ICD-10-CM) - Pain in both lower extremities     Evaluation Date: 12/4/2023  Authorization Period Expiration: 12/31/2023   Plan of Care Expiration: 2/2/24  Progress Note Due: 1/2/24  Date of Surgery: N/A  Visit # / Visits authorized: 1/1, 7  FOTO: 3/3 - done today for d/c next visit  PTA Visit #: 0/5      Precautions: Standard, Sjoegren's Syndrome, L1 compression fracture, and mixed connective tissue disorder.     Time In: 12:05 AM  Time Out: 12:45 AM  Total Billable Time: 40 minutes (2 TE, 1 TA)    Subjective     Patient reports: doing well and minimal to no low back pain. Did have a brief moment of left lower leg discomfort like in the past but went away quickly.   She was compliant with home exercise program.  Response to previous treatment: muscle soreness  Functional change: none at this time    Pain: 0/10  Location: left leg and lower back    Objective      Objective Measures updated at progress report unless specified.     Straight leg raise Test: (-) Bilateral   Slump Test: (-) Bilateral   BLE strength: 5/5 except left hip abduction and extension at 4/5  Lumbar AROM: 100% in all planes    Treatment     Bharti received the treatments listed below:      therapeutic exercises to develop strength, endurance, ROM, flexibility, posture, and core stabilization for 30 minutes including: FOTO and reassessment and education  Bridges: 3x15 double leg   Open books: 12x10'' holds in lumbar manipulation pose  Sidelying  modified side planks: 10x5'' holds Bilateral   Left to right hip lateral glides on wall: 15x3'' holds - NT    Therapeutic activities for 10 minutes including:  Double knees to chest: 20x5'' holds with ball - NT  Leg press: 2x20 double leg, 6.5 plates, sled @6    manual therapy techniques: Joint mobilizations, Manual traction, Myofacial release, Manual Lymphatic Drainage, and Soft tissue Mobilization were applied to the: lumbar spine for 00 minutes, including:  Hooklying lumbar traction  Single leg long axis traction  Left sided L4-S1 gapping  Left QL stretching    Patient Education and Home Exercises       Education provided:   - prognosis and rehab protocol  - anatomy and related symptoms  - importance of home exercise program    Written Home Exercises Provided: yes. Exercises were reviewed and Bharti was able to demonstrate them prior to the end of the session.  Bharti demonstrated good  understanding of the education provided. See Electronic Medical Record under Patient Instructions for exercises provided during therapy sessions    Assessment     Bharti is a 73 y.o. female referred to outpatient Physical Therapy with a medical diagnosis of Lumbar radiculopathy and Pain in both lower extremities. Patient presents with signs and symptoms of lumbar spinal stenosis and peripheral lumbar radiculopathy (+ for LLE) neuro, decreased Bilateral hip AROM with internal rotation, decreased lumbar AROM/PROM and pain/difficulty with transitions to stand with LLE WB activities.    Pt met all short and long term goals, negative for all special testing, and good BLE strength. She will continue her home exercise program and told to focus on bridges and hip abduction strengthening. Pt discharged from PT.    Bharti Is progressing well towards her goals.   Patient prognosis is Good.     Patient will continue to benefit from skilled outpatient physical therapy to address the deficits listed in the problem list box on initial evaluation, provide  pt/family education and to maximize pt's level of independence in the home and community environment.     Patient's spiritual, cultural and educational needs considered and pt agreeable to plan of care and goals.     Anticipated barriers to physical therapy: Sjoegren's Syndrome, L1 compression fracture, and mixed connective tissue disorder.     Goals:   GOALS: Short Term Goals: 4 weeks  1. Pt will demo good TA muscle contraction for improved deep abdominal strength and lumbar stability. - MET  2. Increase lumbar ROM to 100% of WNL in order to improve functional mobility.  - MET  3. Pt will demo good sitting/standing posture and body mechanics for improved spine health and decreased risk of future injury. - MET  4. Pt to tolerate HEP to improve ROM and independence with ADL's. - MET     Long Term Goals: 8 weeks  1. Report decreased low back pain without radiculopathy to </= 2/10 for transitions to stand to increase tolerance for ADLs and increased QoL. - MET  2. Increase strength to >/= 4/5 MMT grade for core and BLE to increase tolerance for ADL and work activities. - MET  3. Pt to demonstrate negative SLR and/or Slump Test in order to show improved core strength and decreased nerve/dural tension. - MET  4. Patient's goal: to have less back and leg pain. - MET  5. Pt will report at </= 36% impaired on FOTO lumbar score for low back pain disability to demonstrate decrease in disability and improvement in back pain. - MET    Plan     Pt discharged from PT.    Carlos Mack, PT

## 2024-02-28 ENCOUNTER — HOSPITAL ENCOUNTER (OUTPATIENT)
Dept: RADIOLOGY | Facility: HOSPITAL | Age: 74
Discharge: HOME | End: 2024-02-28
Attending: INTERNAL MEDICINE
Payer: MEDICARE

## 2024-02-28 DIAGNOSIS — Z12.31 ENCOUNTER FOR SCREENING MAMMOGRAM FOR BREAST CANCER: ICD-10-CM

## 2024-02-28 PROCEDURE — 77067 SCR MAMMO BI INCL CAD: CPT | Mod: TC

## 2024-02-28 PROCEDURE — 77063 BREAST TOMOSYNTHESIS BI: CPT | Mod: 26,,, | Performed by: RADIOLOGY

## 2024-02-28 PROCEDURE — 77067 SCR MAMMO BI INCL CAD: CPT | Mod: 26,,, | Performed by: RADIOLOGY

## 2024-04-17 ENCOUNTER — PATIENT MESSAGE (OUTPATIENT)
Dept: RHEUMATOLOGY | Facility: CLINIC | Age: 74
End: 2024-04-17
Payer: MEDICARE

## 2024-04-17 DIAGNOSIS — Z79.899 IMMUNOSUPPRESSION DUE TO DRUG THERAPY: ICD-10-CM

## 2024-04-17 DIAGNOSIS — J84.9 ILD (INTERSTITIAL LUNG DISEASE): ICD-10-CM

## 2024-04-17 DIAGNOSIS — D84.9 IMMUNOSUPPRESSION: ICD-10-CM

## 2024-04-17 DIAGNOSIS — R53.83 FATIGUE, UNSPECIFIED TYPE: ICD-10-CM

## 2024-04-17 DIAGNOSIS — M35.00 SJOGREN'S SYNDROME, WITH UNSPECIFIED ORGAN INVOLVEMENT: Primary | ICD-10-CM

## 2024-04-17 DIAGNOSIS — I27.20 PULMONARY HYPERTENSION: ICD-10-CM

## 2024-04-17 DIAGNOSIS — D84.821 IMMUNOSUPPRESSION DUE TO DRUG THERAPY: ICD-10-CM

## 2024-04-23 ENCOUNTER — PATIENT MESSAGE (OUTPATIENT)
Dept: RHEUMATOLOGY | Facility: CLINIC | Age: 74
End: 2024-04-23
Payer: MEDICARE

## 2024-04-25 ENCOUNTER — LAB VISIT (OUTPATIENT)
Dept: LAB | Facility: HOSPITAL | Age: 74
End: 2024-04-25
Attending: INTERNAL MEDICINE
Payer: MEDICARE

## 2024-04-25 DIAGNOSIS — R53.83 FATIGUE, UNSPECIFIED TYPE: ICD-10-CM

## 2024-04-25 DIAGNOSIS — I27.20 PULMONARY HYPERTENSION: ICD-10-CM

## 2024-04-25 DIAGNOSIS — D84.821 IMMUNOSUPPRESSION DUE TO DRUG THERAPY: ICD-10-CM

## 2024-04-25 DIAGNOSIS — Z79.899 IMMUNOSUPPRESSION DUE TO DRUG THERAPY: ICD-10-CM

## 2024-04-25 DIAGNOSIS — J84.9 ILD (INTERSTITIAL LUNG DISEASE): ICD-10-CM

## 2024-04-25 DIAGNOSIS — M35.00 SJOGREN'S SYNDROME, WITH UNSPECIFIED ORGAN INVOLVEMENT: ICD-10-CM

## 2024-04-25 LAB
ALBUMIN SERPL BCP-MCNC: 3.8 G/DL (ref 3.5–5.2)
ALP SERPL-CCNC: 97 U/L (ref 55–135)
ALT SERPL W/O P-5'-P-CCNC: 9 U/L (ref 10–44)
ANION GAP SERPL CALC-SCNC: 9 MMOL/L (ref 8–16)
AST SERPL-CCNC: 14 U/L (ref 10–40)
BASOPHILS # BLD AUTO: 0.02 K/UL (ref 0–0.2)
BASOPHILS NFR BLD: 0.4 % (ref 0–1.9)
BILIRUB SERPL-MCNC: 0.4 MG/DL (ref 0.1–1)
BUN SERPL-MCNC: 23 MG/DL (ref 8–23)
C3 SERPL-MCNC: 133 MG/DL (ref 50–180)
C4 SERPL-MCNC: 30 MG/DL (ref 11–44)
CALCIUM SERPL-MCNC: 9.2 MG/DL (ref 8.7–10.5)
CHLORIDE SERPL-SCNC: 99 MMOL/L (ref 95–110)
CK SERPL-CCNC: 47 U/L (ref 20–180)
CO2 SERPL-SCNC: 35 MMOL/L (ref 23–29)
CREAT SERPL-MCNC: 1.1 MG/DL (ref 0.5–1.4)
CRP SERPL-MCNC: 5.1 MG/L (ref 0–8.2)
DIFFERENTIAL METHOD BLD: ABNORMAL
EOSINOPHIL # BLD AUTO: 0.1 K/UL (ref 0–0.5)
EOSINOPHIL NFR BLD: 1.5 % (ref 0–8)
ERYTHROCYTE [DISTWIDTH] IN BLOOD BY AUTOMATED COUNT: 12.8 % (ref 11.5–14.5)
ERYTHROCYTE [SEDIMENTATION RATE] IN BLOOD BY PHOTOMETRIC METHOD: 53 MM/HR (ref 0–36)
EST. GFR  (NO RACE VARIABLE): 53.1 ML/MIN/1.73 M^2
GLUCOSE SERPL-MCNC: 89 MG/DL (ref 70–110)
HCT VFR BLD AUTO: 40.1 % (ref 37–48.5)
HGB BLD-MCNC: 12.3 G/DL (ref 12–16)
IMM GRANULOCYTES # BLD AUTO: 0.04 K/UL (ref 0–0.04)
IMM GRANULOCYTES NFR BLD AUTO: 0.8 % (ref 0–0.5)
LYMPHOCYTES # BLD AUTO: 1.4 K/UL (ref 1–4.8)
LYMPHOCYTES NFR BLD: 26.1 % (ref 18–48)
MCH RBC QN AUTO: 31.7 PG (ref 27–31)
MCHC RBC AUTO-ENTMCNC: 30.7 G/DL (ref 32–36)
MCV RBC AUTO: 103 FL (ref 82–98)
MONOCYTES # BLD AUTO: 0.6 K/UL (ref 0.3–1)
MONOCYTES NFR BLD: 10.4 % (ref 4–15)
NEUTROPHILS # BLD AUTO: 3.2 K/UL (ref 1.8–7.7)
NEUTROPHILS NFR BLD: 60.8 % (ref 38–73)
NRBC BLD-RTO: 0 /100 WBC
PLATELET # BLD AUTO: 238 K/UL (ref 150–450)
PMV BLD AUTO: 9.6 FL (ref 9.2–12.9)
POTASSIUM SERPL-SCNC: 3.6 MMOL/L (ref 3.5–5.1)
PROT SERPL-MCNC: 7.2 G/DL (ref 6–8.4)
RBC # BLD AUTO: 3.88 M/UL (ref 4–5.4)
SODIUM SERPL-SCNC: 143 MMOL/L (ref 136–145)
WBC # BLD AUTO: 5.28 K/UL (ref 3.9–12.7)

## 2024-04-25 PROCEDURE — 80053 COMPREHEN METABOLIC PANEL: CPT | Performed by: INTERNAL MEDICINE

## 2024-04-25 PROCEDURE — 86225 DNA ANTIBODY NATIVE: CPT | Performed by: INTERNAL MEDICINE

## 2024-04-25 PROCEDURE — 85025 COMPLETE CBC W/AUTO DIFF WBC: CPT | Performed by: INTERNAL MEDICINE

## 2024-04-25 PROCEDURE — 86160 COMPLEMENT ANTIGEN: CPT | Mod: 59 | Performed by: INTERNAL MEDICINE

## 2024-04-25 PROCEDURE — 86160 COMPLEMENT ANTIGEN: CPT | Performed by: INTERNAL MEDICINE

## 2024-04-25 PROCEDURE — 36415 COLL VENOUS BLD VENIPUNCTURE: CPT | Performed by: INTERNAL MEDICINE

## 2024-04-25 PROCEDURE — 86140 C-REACTIVE PROTEIN: CPT | Performed by: INTERNAL MEDICINE

## 2024-04-25 PROCEDURE — 85652 RBC SED RATE AUTOMATED: CPT | Performed by: INTERNAL MEDICINE

## 2024-04-25 PROCEDURE — 82550 ASSAY OF CK (CPK): CPT | Performed by: INTERNAL MEDICINE

## 2024-04-26 ENCOUNTER — PATIENT MESSAGE (OUTPATIENT)
Dept: RHEUMATOLOGY | Facility: CLINIC | Age: 74
End: 2024-04-26
Payer: MEDICARE

## 2024-04-26 LAB — DSDNA AB SER-ACNC: NORMAL [IU]/ML

## 2024-04-29 ENCOUNTER — OFFICE VISIT (OUTPATIENT)
Dept: RHEUMATOLOGY | Facility: CLINIC | Age: 74
End: 2024-04-29
Payer: MEDICARE

## 2024-04-29 VITALS
DIASTOLIC BLOOD PRESSURE: 70 MMHG | HEART RATE: 66 BPM | HEIGHT: 69 IN | SYSTOLIC BLOOD PRESSURE: 132 MMHG | WEIGHT: 202 LBS | BODY MASS INDEX: 29.92 KG/M2

## 2024-04-29 DIAGNOSIS — Z79.899 IMMUNOSUPPRESSION DUE TO DRUG THERAPY: ICD-10-CM

## 2024-04-29 DIAGNOSIS — D84.9 IMMUNOSUPPRESSION: ICD-10-CM

## 2024-04-29 DIAGNOSIS — M35.00 SJOGREN'S SYNDROME, WITH UNSPECIFIED ORGAN INVOLVEMENT: Primary | ICD-10-CM

## 2024-04-29 DIAGNOSIS — J84.9 ILD (INTERSTITIAL LUNG DISEASE): ICD-10-CM

## 2024-04-29 DIAGNOSIS — R53.83 FATIGUE, UNSPECIFIED TYPE: ICD-10-CM

## 2024-04-29 DIAGNOSIS — D84.821 IMMUNOSUPPRESSION DUE TO DRUG THERAPY: ICD-10-CM

## 2024-04-29 PROCEDURE — 3044F HG A1C LEVEL LT 7.0%: CPT | Mod: CPTII,S$GLB,, | Performed by: INTERNAL MEDICINE

## 2024-04-29 PROCEDURE — 3075F SYST BP GE 130 - 139MM HG: CPT | Mod: CPTII,S$GLB,, | Performed by: INTERNAL MEDICINE

## 2024-04-29 PROCEDURE — 1126F AMNT PAIN NOTED NONE PRSNT: CPT | Mod: CPTII,S$GLB,, | Performed by: INTERNAL MEDICINE

## 2024-04-29 PROCEDURE — 3008F BODY MASS INDEX DOCD: CPT | Mod: CPTII,S$GLB,, | Performed by: INTERNAL MEDICINE

## 2024-04-29 PROCEDURE — 3078F DIAST BP <80 MM HG: CPT | Mod: CPTII,S$GLB,, | Performed by: INTERNAL MEDICINE

## 2024-04-29 PROCEDURE — 3288F FALL RISK ASSESSMENT DOCD: CPT | Mod: CPTII,S$GLB,, | Performed by: INTERNAL MEDICINE

## 2024-04-29 PROCEDURE — 1160F RVW MEDS BY RX/DR IN RCRD: CPT | Mod: CPTII,S$GLB,, | Performed by: INTERNAL MEDICINE

## 2024-04-29 PROCEDURE — 1159F MED LIST DOCD IN RCRD: CPT | Mod: CPTII,S$GLB,, | Performed by: INTERNAL MEDICINE

## 2024-04-29 PROCEDURE — 1101F PT FALLS ASSESS-DOCD LE1/YR: CPT | Mod: CPTII,S$GLB,, | Performed by: INTERNAL MEDICINE

## 2024-04-29 PROCEDURE — 99999 PR PBB SHADOW E&M-EST. PATIENT-LVL III: CPT | Mod: PBBFAC,,, | Performed by: INTERNAL MEDICINE

## 2024-04-29 PROCEDURE — 99214 OFFICE O/P EST MOD 30 MIN: CPT | Mod: S$GLB,,, | Performed by: INTERNAL MEDICINE

## 2024-04-29 ASSESSMENT — ROUTINE ASSESSMENT OF PATIENT INDEX DATA (RAPID3)
MDHAQ FUNCTION SCORE: 0.3
TOTAL RAPID3 SCORE: 1.67
FATIGUE SCORE: 3
PAIN SCORE: 1
PATIENT GLOBAL ASSESSMENT SCORE: 3
AM STIFFNESS SCORE: 1, YES
WHEN YOU AWAKENED IN THE MORNING OVER THE LAST WEEK, PLEASE INDICATE THE AMOUNT OF TIME IT TAKES UNTIL YOU ARE AS LIMBER AS YOU WILL BE FOR THE DAY: 1 MIN
PSYCHOLOGICAL DISTRESS SCORE: 2.2

## 2024-04-29 NOTE — PROGRESS NOTES
Subjective:       Patient ID: Bharti Woodard is a 73 y.o. female.    Chief Complaint: Sjogrens and ILD    HPI:  Bharti Woodard is a 73 y.o. female with history of Sjogrens diagnosed 50-59 yo. She has difficulty remembering history.   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  Dr. Aldridge at [facility] saw her and diagnosed with lupus and gave prednisone and Plaquenil but she gained 60 pounds.    Saw Dr. Tirado a rheumatologist who diagnosed Sjogrens and sent her a pulmonologist.  Dr. Tirado gave her Mycophenolate.  Pulmonary HTN diagnosed 2010 with Dr. Evans.  Dr. Cassi Nunez rheumatologist at [facility] saw her and was planning to cut down on medication in future.  Patient decided to lower in her own so they felt she was noncompliant.  Patient decided to switch.   Saw Dr. Dunne who did not want to treat her since care needed to be coordinated with rheumatologist.  So she saw Dr. Cabrera and Dr. Herbert.      January 2020 felt very sick.  Tested negative for flu.  She was given antibiotics.  Since then has been fatigued with intermittent joint pains as well as tingling in her feet.     Tylenol 650 mg three times daily helps pain in neck, feet, ankle and hands. Nothing worsens pain.  Pain began after URI treated in January 2020.       Review of chart shows that patient was seen on 2/15/2005 by Dr. Lenora Ackerman in rheumatology for a second opinion.  Per that note patient had persistently elevate sedimentation rate and was diagnosed as PMR by Dr. Aldridge who treated her with prednisone that was later stopped.  Plaquenil was given but caused all over body rash.    Care Everywhere:  1/29/2019 Dr. Herbert note  Biopsy:   Biopsy at  10/2012: diffuse scattered non-necrotizing granulomas (special stains negative) with some areas of fibrosis, but mostly normal lung; scattered lymphoid aggregates    Labs:  November 2014: SSA positive  VENITA 1:1280, positive dsDNA, centromere and Sm/RNP, negative Sm, Scl70, RF, SSB       Had COVID  in June 2022 and was treated with Paxlovid.       INTERVAL HISTORY:    Did PT for back and left leg pain.  It has improved.  Gabapentin helps but is concerned about addictive nature.   She has sudden pain left lower leg.     Works 2-3 days a week for 5 hours as a .     Feels intermittent fatigue but does not sleep.  Uses oxygen at night and when walking.    One or two episodes since last visit difficulty with swallowing after drinking water.    Still uses eye drops for lubricating 1-2 times day prn. Using Restasis. Eyes not as dry since potassium elevated.  Dr. Willson gave meds to correct low potassium.    Saw podiatry who diagnosed neuropathy in legs.    Dr. Madiha Gipson seeing patient now since Dr. Willson left and told patient things look great.  Dr. Madiha Gipson made the following plan.     Plan:  1. CTD-ILD (HP)  a. PFT/6MWT stable. Worsening FERRARO seems to be related to fluid overload rather than progression of ILD.  b. Continuing MMF 1000 mg BID. Checking CBC and CMP today.  c. On 1L NC with exertion for goal O2 sats 88-92%.  d. Counseled on the importance of daily exercise. Patient plans to start going to the gym 3x/week (walk 20-30 minutes on the treadmill).  e. Following with [facility] rheumatology.  2. WHO group I PAH  a. FC II. REVEAL risk score 2 - low risk. BNP today.  b. Continuing tadalafil 40 mg daily. No issues with hypotension.  c. Increased FERRARO appears to be related to 9-pound water weight gain. Patient was on Bumex 2 mg qam and 1 mg qpm. She has increased her pm dose to 2 mg without significant benefit. Will start Bumex 2 mg TID for the next 3-5 days while she monitors her water weight.  d. Discussed the importance of maintaining a low salt diet and taking extra Bumex doses as needed.   e. Takes KCl 20 mEq BID - will increase to TID dosing with the extra Bumex doses over the next 3-5 days.  3. Post-nasal drip: Using BID saline rinses. Asked her to use Flonase BID after saline rinses.  "Discussed starting daily anti-histamine.  4. Dysphagia: No trouble since her last visit.  5. SISI: On CPAP nightly.  6. RTC in 6 months with THADDEUS Gipson MD  [facility] Attending  [phone]         Review of Systems   Constitutional:  Positive for fatigue. Negative for fever and unexpected weight change.   HENT:  Negative for mouth sores and trouble swallowing.    Eyes:  Negative for redness.   Respiratory:  Negative for cough and shortness of breath.    Cardiovascular:  Negative for chest pain.   Gastrointestinal:  Negative for constipation and diarrhea.   Endocrine: Negative.    Genitourinary:  Negative for dysuria and genital sores.   Musculoskeletal:  Negative for arthralgias.   Skin:  Negative for rash.   Neurological:  Negative for headaches.   Hematological:  Does not bruise/bleed easily.         Objective:   /70   Pulse 66   Ht 5' 9" (1.753 m)   Wt 91.6 kg (202 lb)   LMP 01/01/1976   BMI 29.83 kg/m²      Physical Exam   Constitutional: She is oriented to person, place, and time.   HENT:   Head: Normocephalic and atraumatic.   Eyes: Conjunctivae are normal.   Cardiovascular: Normal rate, regular rhythm and normal heart sounds.   Pulmonary/Chest: Effort normal and breath sounds normal.   Abdominal: Soft. Bowel sounds are normal.   Musculoskeletal:         General: No tenderness or deformity.      Cervical back: Neck supple.      Comments: Multiple Heberdens nodes  No pain on compression of MTPs bilateral   Neurological: She is alert and oriented to person, place, and time.   Skin: Skin is warm and dry.   Psychiatric: Mood and affect normal.       LABS    Component      Latest Ref Rng 4/25/2024   WBC      3.90 - 12.70 K/uL 5.28    RBC      4.00 - 5.40 M/uL 3.88 (L)    Hemoglobin      12.0 - 16.0 g/dL 12.3    Hematocrit      37.0 - 48.5 % 40.1    MCV      82 - 98 fL 103 (H)    MCH      27.0 - 31.0 pg 31.7 (H)    MCHC      32.0 - 36.0 g/dL 30.7 (L)    RDW      11.5 - 14.5 % 12.8    Platelet " Count      150 - 450 K/uL 238    MPV      9.2 - 12.9 fL 9.6    Immature Granulocytes      0.0 - 0.5 % 0.8 (H)    Gran # (ANC)      1.8 - 7.7 K/uL 3.2    Immature Grans (Abs)      0.00 - 0.04 K/uL 0.04    Lymph #      1.0 - 4.8 K/uL 1.4    Mono #      0.3 - 1.0 K/uL 0.6    Eos #      0.0 - 0.5 K/uL 0.1    Baso #      0.00 - 0.20 K/uL 0.02    nRBC      0 /100 WBC 0    Gran %      38.0 - 73.0 % 60.8    Lymph %      18.0 - 48.0 % 26.1    Mono %      4.0 - 15.0 % 10.4    Eos %      0.0 - 8.0 % 1.5    Basophil %      0.0 - 1.9 % 0.4    Differential Method Automated    Sodium      136 - 145 mmol/L 143    Potassium      3.5 - 5.1 mmol/L 3.6    Chloride      95 - 110 mmol/L 99    CO2      23 - 29 mmol/L 35 (H)    Glucose      70 - 110 mg/dL 89    BUN      8 - 23 mg/dL 23    Creatinine      0.5 - 1.4 mg/dL 1.1    Calcium      8.7 - 10.5 mg/dL 9.2    PROTEIN TOTAL      6.0 - 8.4 g/dL 7.2    Albumin      3.5 - 5.2 g/dL 3.8    BILIRUBIN TOTAL      0.1 - 1.0 mg/dL 0.4    ALP      55 - 135 U/L 97    AST      10 - 40 U/L 14    ALT      10 - 44 U/L 9 (L)    eGFR      >60 mL/min/1.73 m^2 53.1 !    Anion Gap      8 - 16 mmol/L 9    Specimen UA Urine, Clean Catch    Color, UA      Yellow, Straw, Alisa  Yellow    Appearance, UA      Clear  Hazy !    pH, UA      5.0 - 8.0  6.0    Specific Gravity, UA      1.005 - 1.030  1.020    Protein, UA      Negative  Negative    Glucose, UA      Negative  Negative    Ketones, UA      Negative  Negative    Bilirubin (UA)      Negative  Negative    Blood, UA      Negative  Negative    NITRITE UA      Negative  Negative    Leukocyte Esterase, UA      Negative  Negative    Urine Protein      0 - 15 mg/dL <7    Urine Creatinine      15.0 - 325.0 mg/dL 72.0    Prot/Creat Ratio, Urine      0.00 - 0.20  Unable to calculate    ds DNA Ab      Negative 1:10  Negative 1:10    Complement (C-3)      50 - 180 mg/dL 133    Complement (C-4)      11 - 44 mg/dL 30    CPK      20 - 180 U/L 47    Sed Rate      0 - 36  mm/Hr 53 (H)    CRP      0.0 - 8.2 mg/L 5.1       Legend:  (L) Low  (H) High  ! Abnormal     Assessment:       1. Sjogern's disease.  Dry eyes persist.  Uses lubricating drops prn.  Restasis daily.  2. Pulmonary HTN.  Tadalafil with Dr. Gipson  3. Interstitial lung disease.  Continues on mycophenolate 1,000 mg bid managed by pulmonary Dr. Willson.    Uses O2 with activity and sleep at 2-3l.  Had in January 2021 PFT, walk test and echo with Dr. Willson and will see him today.   4. Joint pain.  Hands, feet and neck.   5. Stress/Depression.  Encourage patient to speak with primary  6. Immunosuppression  7. Former smoker.  3 packs a day for five years.  Quit in 1995  8. Left elbow medial pain.  Suggestive of golfers elbow.  9. Right knee and lower leg pain.  With weather changes  10. Osteoporosis.  Based on L1 compression on x-ray newly developed in 8/2021.   Repeat bone density 1/2024  11. Swallowing issue.  Evaluated by GI Dr. Dedra Tolliver who wanted to dilate her but patient decided to wait    Plan:       1.  Labs now to evaluate fatigue and SOB.    2.  Follow with pulmonary for Cellcept.  Will not add Plaquenil since she had all over body rash in past.   3.  Consider using O2 daily  4.  Consider using brace for tennis elbow to deal with golfer's elbow.  5.  OTC Voltaren gel for elbow and intermittent knee pain. Ice 2-3x a day.   6.  Continue Tylenol not to exceed 3,000 mg daily  7.  Continue lubricating eye drops three time daily.  Using Restasis.  Was interested in discussing Vuity with eye doctor. .   8.  Calcium 1,000 mg daily and vitamin D 1,000 unit daily  9.  Patient dentist objected to her using bisphosphonate or denosumab IV.  Patient following with dentist still has not pulled teeth and 1 crown still needs to placed.   Fall prevention discussed  10.  Continue exercises for hand from OT and start paraffin wax weekly.       RTO 4 months/prn

## 2024-05-06 ENCOUNTER — HOSPITAL ENCOUNTER (OUTPATIENT)
Dept: RADIOLOGY | Facility: HOSPITAL | Age: 74
Discharge: HOME | End: 2024-05-06
Attending: INTERNAL MEDICINE
Payer: MEDICARE

## 2024-05-06 DIAGNOSIS — M79.605 PAIN IN BOTH LOWER EXTREMITIES: ICD-10-CM

## 2024-05-06 DIAGNOSIS — M79.604 PAIN IN BOTH LOWER EXTREMITIES: ICD-10-CM

## 2024-05-06 PROCEDURE — 93925 LOWER EXTREMITY STUDY: CPT | Mod: TC

## 2024-05-06 PROCEDURE — 93925 LOWER EXTREMITY STUDY: CPT | Mod: 26,,, | Performed by: RADIOLOGY

## 2024-08-13 ENCOUNTER — LAB VISIT (OUTPATIENT)
Dept: LAB | Facility: HOSPITAL | Age: 74
End: 2024-08-13
Attending: INTERNAL MEDICINE
Payer: MEDICARE

## 2024-08-13 DIAGNOSIS — J84.9 ILD (INTERSTITIAL LUNG DISEASE): ICD-10-CM

## 2024-08-13 DIAGNOSIS — M35.00 SJOGREN'S SYNDROME, WITH UNSPECIFIED ORGAN INVOLVEMENT: ICD-10-CM

## 2024-08-13 DIAGNOSIS — I27.20 PULMONARY HYPERTENSION: ICD-10-CM

## 2024-08-13 DIAGNOSIS — R53.83 FATIGUE, UNSPECIFIED TYPE: ICD-10-CM

## 2024-08-13 DIAGNOSIS — Z79.899 IMMUNOSUPPRESSION DUE TO DRUG THERAPY: ICD-10-CM

## 2024-08-13 DIAGNOSIS — D84.821 IMMUNOSUPPRESSION DUE TO DRUG THERAPY: ICD-10-CM

## 2024-08-13 LAB
ALBUMIN SERPL BCP-MCNC: 3.5 G/DL (ref 3.5–5.2)
ALBUMIN SERPL BCP-MCNC: 3.5 G/DL (ref 3.5–5.2)
ALP SERPL-CCNC: 90 U/L (ref 55–135)
ALP SERPL-CCNC: 90 U/L (ref 55–135)
ALT SERPL W/O P-5'-P-CCNC: 11 U/L (ref 10–44)
ALT SERPL W/O P-5'-P-CCNC: 11 U/L (ref 10–44)
ANION GAP SERPL CALC-SCNC: 10 MMOL/L (ref 8–16)
ANION GAP SERPL CALC-SCNC: 10 MMOL/L (ref 8–16)
AST SERPL-CCNC: 14 U/L (ref 10–40)
AST SERPL-CCNC: 14 U/L (ref 10–40)
BASOPHILS # BLD AUTO: 0.02 K/UL (ref 0–0.2)
BASOPHILS # BLD AUTO: 0.02 K/UL (ref 0–0.2)
BASOPHILS NFR BLD: 0.4 % (ref 0–1.9)
BASOPHILS NFR BLD: 0.4 % (ref 0–1.9)
BILIRUB SERPL-MCNC: 0.3 MG/DL (ref 0.1–1)
BILIRUB SERPL-MCNC: 0.3 MG/DL (ref 0.1–1)
BUN SERPL-MCNC: 13 MG/DL (ref 8–23)
BUN SERPL-MCNC: 13 MG/DL (ref 8–23)
C3 SERPL-MCNC: 137 MG/DL (ref 50–180)
C4 SERPL-MCNC: 32 MG/DL (ref 11–44)
CALCIUM SERPL-MCNC: 9.4 MG/DL (ref 8.7–10.5)
CALCIUM SERPL-MCNC: 9.4 MG/DL (ref 8.7–10.5)
CHLORIDE SERPL-SCNC: 100 MMOL/L (ref 95–110)
CHLORIDE SERPL-SCNC: 100 MMOL/L (ref 95–110)
CK SERPL-CCNC: 36 U/L (ref 20–180)
CO2 SERPL-SCNC: 28 MMOL/L (ref 23–29)
CO2 SERPL-SCNC: 28 MMOL/L (ref 23–29)
CREAT SERPL-MCNC: 0.9 MG/DL (ref 0.5–1.4)
CREAT SERPL-MCNC: 0.9 MG/DL (ref 0.5–1.4)
CRP SERPL-MCNC: 10.6 MG/L (ref 0–8.2)
DIFFERENTIAL METHOD BLD: ABNORMAL
DIFFERENTIAL METHOD BLD: ABNORMAL
EOSINOPHIL # BLD AUTO: 0.1 K/UL (ref 0–0.5)
EOSINOPHIL # BLD AUTO: 0.1 K/UL (ref 0–0.5)
EOSINOPHIL NFR BLD: 1.6 % (ref 0–8)
EOSINOPHIL NFR BLD: 1.6 % (ref 0–8)
ERYTHROCYTE [DISTWIDTH] IN BLOOD BY AUTOMATED COUNT: 13.1 % (ref 11.5–14.5)
ERYTHROCYTE [DISTWIDTH] IN BLOOD BY AUTOMATED COUNT: 13.1 % (ref 11.5–14.5)
ERYTHROCYTE [SEDIMENTATION RATE] IN BLOOD BY PHOTOMETRIC METHOD: 58 MM/HR (ref 0–36)
EST. GFR  (NO RACE VARIABLE): >60 ML/MIN/1.73 M^2
EST. GFR  (NO RACE VARIABLE): >60 ML/MIN/1.73 M^2
FOLATE SERPL-MCNC: >40 NG/ML (ref 4–24)
GLUCOSE SERPL-MCNC: 111 MG/DL (ref 70–110)
GLUCOSE SERPL-MCNC: 111 MG/DL (ref 70–110)
HCT VFR BLD AUTO: 35.1 % (ref 37–48.5)
HCT VFR BLD AUTO: 35.1 % (ref 37–48.5)
HGB BLD-MCNC: 11.4 G/DL (ref 12–16)
HGB BLD-MCNC: 11.4 G/DL (ref 12–16)
IMM GRANULOCYTES # BLD AUTO: 0.04 K/UL (ref 0–0.04)
IMM GRANULOCYTES # BLD AUTO: 0.04 K/UL (ref 0–0.04)
IMM GRANULOCYTES NFR BLD AUTO: 0.7 % (ref 0–0.5)
IMM GRANULOCYTES NFR BLD AUTO: 0.7 % (ref 0–0.5)
LYMPHOCYTES # BLD AUTO: 1.3 K/UL (ref 1–4.8)
LYMPHOCYTES # BLD AUTO: 1.3 K/UL (ref 1–4.8)
LYMPHOCYTES NFR BLD: 23.6 % (ref 18–48)
LYMPHOCYTES NFR BLD: 23.6 % (ref 18–48)
MCH RBC QN AUTO: 32.7 PG (ref 27–31)
MCH RBC QN AUTO: 32.7 PG (ref 27–31)
MCHC RBC AUTO-ENTMCNC: 32.5 G/DL (ref 32–36)
MCHC RBC AUTO-ENTMCNC: 32.5 G/DL (ref 32–36)
MCV RBC AUTO: 101 FL (ref 82–98)
MCV RBC AUTO: 101 FL (ref 82–98)
MONOCYTES # BLD AUTO: 0.5 K/UL (ref 0.3–1)
MONOCYTES # BLD AUTO: 0.5 K/UL (ref 0.3–1)
MONOCYTES NFR BLD: 8.6 % (ref 4–15)
MONOCYTES NFR BLD: 8.6 % (ref 4–15)
NEUTROPHILS # BLD AUTO: 3.7 K/UL (ref 1.8–7.7)
NEUTROPHILS # BLD AUTO: 3.7 K/UL (ref 1.8–7.7)
NEUTROPHILS NFR BLD: 65.1 % (ref 38–73)
NEUTROPHILS NFR BLD: 65.1 % (ref 38–73)
NRBC BLD-RTO: 0 /100 WBC
NRBC BLD-RTO: 0 /100 WBC
PLATELET # BLD AUTO: 261 K/UL (ref 150–450)
PLATELET # BLD AUTO: 261 K/UL (ref 150–450)
PMV BLD AUTO: 9 FL (ref 9.2–12.9)
PMV BLD AUTO: 9 FL (ref 9.2–12.9)
POTASSIUM SERPL-SCNC: 3.8 MMOL/L (ref 3.5–5.1)
POTASSIUM SERPL-SCNC: 3.8 MMOL/L (ref 3.5–5.1)
PROT SERPL-MCNC: 6.9 G/DL (ref 6–8.4)
PROT SERPL-MCNC: 6.9 G/DL (ref 6–8.4)
RBC # BLD AUTO: 3.49 M/UL (ref 4–5.4)
RBC # BLD AUTO: 3.49 M/UL (ref 4–5.4)
SODIUM SERPL-SCNC: 138 MMOL/L (ref 136–145)
SODIUM SERPL-SCNC: 138 MMOL/L (ref 136–145)
VIT B12 SERPL-MCNC: 1172 PG/ML (ref 210–950)
WBC # BLD AUTO: 5.68 K/UL (ref 3.9–12.7)
WBC # BLD AUTO: 5.68 K/UL (ref 3.9–12.7)

## 2024-08-13 PROCEDURE — 86140 C-REACTIVE PROTEIN: CPT | Performed by: INTERNAL MEDICINE

## 2024-08-13 PROCEDURE — 85652 RBC SED RATE AUTOMATED: CPT | Performed by: INTERNAL MEDICINE

## 2024-08-13 PROCEDURE — 85025 COMPLETE CBC W/AUTO DIFF WBC: CPT | Performed by: INTERNAL MEDICINE

## 2024-08-13 PROCEDURE — 80053 COMPREHEN METABOLIC PANEL: CPT | Performed by: INTERNAL MEDICINE

## 2024-08-13 PROCEDURE — 86160 COMPLEMENT ANTIGEN: CPT | Mod: 59 | Performed by: INTERNAL MEDICINE

## 2024-08-13 PROCEDURE — 82550 ASSAY OF CK (CPK): CPT | Performed by: INTERNAL MEDICINE

## 2024-08-13 PROCEDURE — 86225 DNA ANTIBODY NATIVE: CPT | Performed by: INTERNAL MEDICINE

## 2024-08-13 PROCEDURE — 86160 COMPLEMENT ANTIGEN: CPT | Performed by: INTERNAL MEDICINE

## 2024-08-13 PROCEDURE — 36415 COLL VENOUS BLD VENIPUNCTURE: CPT | Performed by: INTERNAL MEDICINE

## 2024-08-13 PROCEDURE — 82746 ASSAY OF FOLIC ACID SERUM: CPT | Performed by: INTERNAL MEDICINE

## 2024-08-13 PROCEDURE — 82607 VITAMIN B-12: CPT | Performed by: INTERNAL MEDICINE

## 2024-08-14 ENCOUNTER — ANESTHESIA EVENT (OUTPATIENT)
Dept: ENDOSCOPY | Facility: HOSPITAL | Age: 74
End: 2024-08-14
Payer: MEDICARE

## 2024-08-14 ENCOUNTER — HOSPITAL ENCOUNTER (EMERGENCY)
Facility: HOSPITAL | Age: 74
Discharge: HOME | End: 2024-08-14
Attending: EMERGENCY MEDICINE
Payer: MEDICARE

## 2024-08-14 ENCOUNTER — ANESTHESIA (OUTPATIENT)
Dept: ENDOSCOPY | Facility: HOSPITAL | Age: 74
End: 2024-08-14
Payer: MEDICARE

## 2024-08-14 ENCOUNTER — PATIENT MESSAGE (OUTPATIENT)
Dept: RHEUMATOLOGY | Facility: CLINIC | Age: 74
End: 2024-08-14
Payer: MEDICARE

## 2024-08-14 VITALS
DIASTOLIC BLOOD PRESSURE: 59 MMHG | RESPIRATION RATE: 20 BRPM | SYSTOLIC BLOOD PRESSURE: 127 MMHG | WEIGHT: 201.94 LBS | HEART RATE: 77 BPM | BODY MASS INDEX: 29.91 KG/M2 | OXYGEN SATURATION: 95 % | HEIGHT: 69 IN | TEMPERATURE: 98 F
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
## 2024-08-14 DIAGNOSIS — K56.699 FOOD BOLUS OBSTRUCTION OF INTESTINE: ICD-10-CM

## 2024-08-14 DIAGNOSIS — R13.10 DYSPHAGIA, UNSPECIFIED TYPE: ICD-10-CM

## 2024-08-14 DIAGNOSIS — T18.128A ESOPHAGEAL OBSTRUCTION DUE TO FOOD IMPACTION: Primary | ICD-10-CM
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
## 2024-08-14 PROCEDURE — 96372 THER/PROPH/DIAG INJ SC/IM: CPT

## 2024-08-14 PROCEDURE — 96374 THER/PROPH/DIAG INJ IV PUSH: CPT

## 2024-08-14 PROCEDURE — 25000003 PHARM REV CODE 250: Performed by: NURSE ANESTHETIST, CERTIFIED REGISTERED

## 2024-08-14 PROCEDURE — 37000008 HC ANESTHESIA 1ST 15 MINUTES: Performed by: STUDENT IN AN ORGANIZED HEALTH CARE EDUCATION/TRAINING PROGRAM

## 2024-08-14 PROCEDURE — 99285 EMERGENCY DEPT VISIT HI MDM: CPT | Mod: 25

## 2024-08-14 PROCEDURE — 63600175 PHARM REV CODE 636 W HCPCS: Performed by: NURSE ANESTHETIST, CERTIFIED REGISTERED

## 2024-08-14 PROCEDURE — 43247 EGD REMOVE FOREIGN BODY: CPT | Performed by: STUDENT IN AN ORGANIZED HEALTH CARE EDUCATION/TRAINING PROGRAM

## 2024-08-14 PROCEDURE — 63600175 PHARM REV CODE 636 W HCPCS

## 2024-08-14 PROCEDURE — 37000009 HC ANESTHESIA EA ADD 15 MINS: Performed by: STUDENT IN AN ORGANIZED HEALTH CARE EDUCATION/TRAINING PROGRAM

## 2024-08-14 RX ORDER — LIDOCAINE HYDROCHLORIDE 20 MG/ML
INJECTION INTRAVENOUS
Status: DISCONTINUED | OUTPATIENT
Start: 2024-08-14 | End: 2024-08-14

## 2024-08-14 RX ORDER — GLUCAGON 1 MG
1 KIT INJECTION
Status: COMPLETED | OUTPATIENT
Start: 2024-08-14 | End: 2024-08-14

## 2024-08-14 RX ORDER — PROPOFOL 10 MG/ML
VIAL (ML) INTRAVENOUS
Status: DISCONTINUED | OUTPATIENT
Start: 2024-08-14 | End: 2024-08-14

## 2024-08-14 RX ORDER — ONDANSETRON HYDROCHLORIDE 2 MG/ML
4 INJECTION, SOLUTION INTRAVENOUS DAILY PRN
Status: DISCONTINUED | OUTPATIENT
Start: 2024-08-14 | End: 2024-08-19 | Stop reason: HOSPADM

## 2024-08-14 RX ORDER — SUCCINYLCHOLINE CHLORIDE 20 MG/ML
INJECTION INTRAMUSCULAR; INTRAVENOUS
Status: DISCONTINUED | OUTPATIENT
Start: 2024-08-14 | End: 2024-08-14

## 2024-08-14 RX ORDER — DEXAMETHASONE SODIUM PHOSPHATE 4 MG/ML
INJECTION, SOLUTION INTRA-ARTICULAR; INTRALESIONAL; INTRAMUSCULAR; INTRAVENOUS; SOFT TISSUE
Status: DISCONTINUED | OUTPATIENT
Start: 2024-08-14 | End: 2024-08-14

## 2024-08-14 RX ORDER — HYDROMORPHONE HYDROCHLORIDE 2 MG/ML
0.2 INJECTION, SOLUTION INTRAMUSCULAR; INTRAVENOUS; SUBCUTANEOUS EVERY 5 MIN PRN
Status: DISCONTINUED | OUTPATIENT
Start: 2024-08-14 | End: 2024-08-19 | Stop reason: HOSPADM

## 2024-08-14 RX ORDER — OXYCODONE HYDROCHLORIDE 5 MG/1
5 TABLET ORAL
Status: DISCONTINUED | OUTPATIENT
Start: 2024-08-14 | End: 2024-08-19 | Stop reason: HOSPADM

## 2024-08-14 RX ORDER — GLUCAGON 1 MG
1 KIT INJECTION
Status: DISCONTINUED | OUTPATIENT
Start: 2024-08-14 | End: 2024-08-19 | Stop reason: HOSPADM

## 2024-08-14 RX ORDER — PANTOPRAZOLE SODIUM 40 MG/1
40 TABLET, DELAYED RELEASE ORAL 2 TIMES DAILY
Qty: 180 TABLET | Refills: 0 | Status: SHIPPED | OUTPATIENT
Start: 2024-08-14 | End: 2024-11-12

## 2024-08-14 RX ORDER — ONDANSETRON HYDROCHLORIDE 2 MG/ML
4 INJECTION, SOLUTION INTRAVENOUS
Status: COMPLETED | OUTPATIENT
Start: 2024-08-14 | End: 2024-08-14

## 2024-08-14 RX ORDER — PHENYLEPHRINE HYDROCHLORIDE 10 MG/ML
INJECTION INTRAVENOUS
Status: DISCONTINUED | OUTPATIENT
Start: 2024-08-14 | End: 2024-08-14

## 2024-08-14 RX ORDER — OXYCODONE HYDROCHLORIDE 5 MG/1
10 TABLET ORAL EVERY 4 HOURS PRN
Status: DISCONTINUED | OUTPATIENT
Start: 2024-08-14 | End: 2024-08-19 | Stop reason: HOSPADM

## 2024-08-14 RX ORDER — PROCHLORPERAZINE EDISYLATE 5 MG/ML
5 INJECTION INTRAMUSCULAR; INTRAVENOUS EVERY 30 MIN PRN
Status: DISCONTINUED | OUTPATIENT
Start: 2024-08-14 | End: 2024-08-19 | Stop reason: HOSPADM

## 2024-08-14 RX ORDER — FENTANYL CITRATE 50 UG/ML
INJECTION, SOLUTION INTRAMUSCULAR; INTRAVENOUS
Status: DISCONTINUED | OUTPATIENT
Start: 2024-08-14 | End: 2024-08-14

## 2024-08-14 RX ADMIN — FENTANYL CITRATE 100 MCG: 50 INJECTION INTRAMUSCULAR; INTRAVENOUS at 09:08

## 2024-08-14 RX ADMIN — GLUCAGON 1 MG: 1 INJECTION, POWDER, LYOPHILIZED, FOR SOLUTION INTRAMUSCULAR; INTRAVENOUS at 08:08

## 2024-08-14 RX ADMIN — DEXAMETHASONE SODIUM PHOSPHATE 8 MG: 4 INJECTION, SOLUTION INTRAMUSCULAR; INTRAVENOUS at 10:08

## 2024-08-14 RX ADMIN — PROPOFOL 150 MG: 10 INJECTION, EMULSION INTRAVENOUS at 09:08

## 2024-08-14 RX ADMIN — SODIUM CHLORIDE, POTASSIUM CHLORIDE, SODIUM LACTATE AND CALCIUM CHLORIDE 1000 ML: 600; 310; 30; 20 INJECTION, SOLUTION INTRAVENOUS at 08:08

## 2024-08-14 RX ADMIN — ONDANSETRON 4 MG: 2 INJECTION INTRAMUSCULAR; INTRAVENOUS at 08:08

## 2024-08-14 RX ADMIN — SUCCINYLCHOLINE CHLORIDE 160 MG: 20 INJECTION, SOLUTION INTRAMUSCULAR; INTRAVENOUS at 09:08

## 2024-08-14 RX ADMIN — LIDOCAINE HYDROCHLORIDE 100 MG: 20 INJECTION, SOLUTION INTRAVENOUS at 09:08

## 2024-08-14 RX ADMIN — PHENYLEPHRINE HYDROCHLORIDE 200 MCG: 10 INJECTION INTRAVENOUS at 10:08

## 2024-08-14 RX ADMIN — GLYCOPYRROLATE 0.2 MG: 0.2 INJECTION, SOLUTION INTRAMUSCULAR; INTRAVITREAL at 10:08

## 2024-08-15 ENCOUNTER — TELEPHONE (OUTPATIENT)
Dept: GASTROENTEROLOGY | Facility: CLINIC | Age: 74
End: 2024-08-15
Payer: MEDICARE

## 2024-08-15 LAB — DSDNA AB SER-ACNC: NORMAL [IU]/ML

## 2024-08-15 NOTE — ED PROVIDER NOTES
Encounter Date: 8/14/2024       History     Chief Complaint   Patient presents with    Foreign Body In Throat     Eating meat and veggie around 1600 feels like something has been stuck in throat. Spitting in triage. Feels like she cannot swallow. Speech clear. Talking in full sentences. Hx of FB removal from throat.      The history is provided by the patient.     Patient is a 73-year-old female with a past medical history of pulmonary hypertension, ILD, Sjogren's syndrome, depression who presents due to the feeling of a foreign body in her throat.  She notes that this is happened to her in the past, and that 2 years ago she had to receive an EGD to remove the food bolus.  She states today she was eating steak a proximally 4 hours prior to arriving to the emergency department when the foreign body sensation started and since that time has been persistent.  She notes that she has tried to swallow fluids but that this has been unsuccessful.  She states that it feels similar to a few years ago when she needed to get an EGD.  Notes a little bit of pain in her chest where she believes the fluid bolus stuck but is denying having any other symptoms at this time including abdominal pain.    Review of patient's allergies indicates:   Allergen Reactions    Nitrate analogues      Contraindication for pulmonary hypertension    Prednisone     Plaquenil [hydroxychloroquine] Rash     Past Medical History:   Diagnosis Date    Depression     ILD (interstitial lung disease)     Pulmonary hypertension     Sjoegren syndrome      Past Surgical History:   Procedure Laterality Date    CARDIAC CATHETERIZATION Right 2016    Cataract surgery Bilateral     COLONOSCOPY N/A 8/3/2021    Procedure: COLONOSCOPY;  Surgeon: Dedra Tolliver MD;  Location: [address]);  Service: Endoscopy;  Laterality: N/A;    ESOPHAGEAL MANOMETRY WITH MEASUREMENT OF IMPEDANCE N/A 8/3/2021    Procedure: MANOMETRY, ESOPHAGUS, WITH IMPEDANCE MEASUREMENT;   Surgeon: Dedra Tolliver MD;  [address]  Service: Endoscopy;  Laterality: N/A;  r/o rumination    ESOPHAGOGASTRODUODENOSCOPY N/A 6/15/2021    Procedure: EGD (ESOPHAGOGASTRODUODENOSCOPY);  Surgeon: Yuriy Zimmerman MD;  Location: [facility];  Service: Endoscopy;  Laterality: N/A;    ESOPHAGOGASTRODUODENOSCOPY N/A 8/3/2021    Procedure: EGD (ESOPHAGOGASTRODUODENOSCOPY) with endoflip, esophageal manometry catheter placement, followed by esophageal manometry with dysphagia and rumination protocol AND colonoscopy.  Colonoscopy should be first, followed by EGD.;  Surgeon: Dedra Tolliver MD;  [facility]  Service: Endoscopy;  Laterality: N/A;  Endoflip/endoscopically placed manometry probe  2nd floor-Pu    HYSTERECTOMY      26 yrs old    LUNG BIOPSY       Family History   Problem Relation Name Age of Onset    Diabetes Mother      Heart disease Mother          open heart surgery    Diabetes Father      Diabetes Sister      Breast cancer Sister      Stroke Brother      Diabetes Brother      Breast cancer Maternal Aunt      Colon cancer Neg Hx      Ovarian cancer Neg Hx      Inflammatory bowel disease Neg Hx      Lupus Neg Hx      Psoriasis Neg Hx      Rheum arthritis Neg Hx       Social History     Tobacco Use    Smoking status: Former     Current packs/day: 0.00     Average packs/day: 3.0 packs/day for 5.0 years (15.0 ttl pk-yrs)     Types: Cigarettes     Start date: 1990     Quit date: 1995     Years since quittin.6    Smokeless tobacco: Never    Tobacco comments:     Book keeper now was    Substance Use Topics    Alcohol use: Yes     Comment: occasional-wine    Drug use: No     Physical Exam     Initial Vitals [24]   BP Pulse Resp Temp SpO2   138/69 78 18 98.1 °F (36.7 °C) (!) 94 %      MAP       --         Physical Exam    Nursing note and vitals reviewed.  Constitutional: She appears well-developed. No distress.   HENT:   Head: Normocephalic  and atraumatic.   Mouth/Throat: Oropharynx is clear and moist and mucous membranes are normal.   Eyes: EOM are normal. Pupils are equal, round, and reactive to light.   Neck:   Normal range of motion.  Cardiovascular:  Normal rate and regular rhythm.           Pulmonary/Chest: Breath sounds normal. No respiratory distress. She has no wheezes. She has no rhonchi. She has no rales.   There was no tenderness to palpation of her chest   Abdominal: Abdomen is soft. She exhibits no distension. There is no abdominal tenderness. There is no rebound and no guarding.   Musculoskeletal:         General: No edema.      Cervical back: Normal range of motion.     Neurological: She is alert and oriented to person, place, and time.   Skin: Skin is warm.   Psychiatric: She has a normal mood and affect. Her behavior is normal. Thought content normal.         ED Course   Procedures  Labs Reviewed - No data to display       Imaging Results    None          Medications   oxyCODONE immediate release tablet 10 mg (has no administration in time range)   oxyCODONE immediate release tablet 5 mg (has no administration in time range)   HYDROmorphone (PF) injection 0.2 mg (has no administration in time range)   ondansetron injection 4 mg (has no administration in time range)   prochlorperazine injection Soln 5 mg (has no administration in time range)   glucagon (human recombinant) injection 1 mg (has no administration in time range)   dextrose 10% bolus 125 mL 125 mL (has no administration in time range)   dextrose 10% bolus 250 mL 250 mL (has no administration in time range)   glucagon (human recombinant) injection 1 mg (1 mg Intramuscular Given 8/14/24 2034)   ondansetron injection 4 mg (4 mg Intravenous Given 8/14/24 2030)   lactated ringers bolus 1,000 mL (1,000 mLs Intravenous New Bag 8/14/24 2034)     Medical Decision Making  Patient was is a 73-year-old female who presents due to concerns of a food bolus.  Upon my initial evaluation of  the patient, she had overall reassuring vital signs but a concerning physical exam.  Based on chart review, she has a history of having this and it was noted that she had to have an EGD a few years ago to remove a food bolus.  We will give a dose of glucagon, Zofran and a L of fluids in an attempt to medically manage the patient, however reached out to GI out of suspicion that she may need an EGD to remove the food bolus.    After evaluating the patient, GI agreed that her presentation is likely due to a food bolus, and agreed to take the patient to endoscopy.    Risk  Prescription drug management.                                Clinical Impression:  Final diagnoses:  [K56.699, W44.F3XA] Food bolus obstruction of intestine  [T18.128A, W44.F3XA] Esophageal obstruction due to food impaction (Primary)                 Parth Lozano MD  Resident  08/14/24 [id number]

## 2024-08-15 NOTE — PLAN OF CARE
Patient has met PACU discharge criteria, VSS, pain well controlled. Family updated by text. Released from PACU per protocol.  Report given to Shaina SAM

## 2024-08-15 NOTE — TELEPHONE ENCOUNTER
----- Message from Ijeoma Santamaria PA-C sent at 8/15/2024 11:10 AM CDT -----  Regarding: RE: Alka rivas/husam  Contact: [phone]  Yes, you can schedule her to see me in clinic. Thanks!  ----- Message -----  From: Anjelica Gauthier MA  Sent: 8/15/2024  10:14 AM CDT  To: Ijeoma Santamaria PA-C  Subject: FW: Alka f/u                                 Please review/advise, thank you.    Can you see this patient for a hospital follow? Or should she see Dr. Tolliver?  She last saw Dr. Tolliver in 2021 for Dysphagia, patient was seen in the ED yesterday and had an EGD done for food impaction.   The EGD recommendations are--    - Follow up with Dr. Tolliver for further evaluation and EGD with EoE biopsies and likely TTS balloon dilation  ----- Message -----  From: Caterina Anglees  Sent: 8/15/2024   9:59 AM CDT  To: Sharee Colmenares Staff  Subject: Alka f/u                                     Bharti Vance calling regarding Appointment Access  (message) for # pt was seen in the ER last night and was told to f/u with provider pls advise schedule soonest avail

## 2024-08-15 NOTE — PROVATION PATIENT INSTRUCTIONS
Discharge Summary/Instructions after an Endoscopic Procedure  Patient Name: Bharti Woodard  Patient MRN: [id number]  Patient YOB: 1950 Wednesday, August 14, 2024  Pascual Ho MD  Dear patient,  As a result of recent federal legislation (The Federal Cures Act), you may   receive lab or pathology results from your procedure in your [facility]   account before your physician is able to contact you. Your physician or   their representative will relay the results to you with their   recommendations at their soonest availability.  Thank you,  Your health is very important to us during the Covid Crisis. Following your   procedure today, you will receive a daily text for 2 weeks asking about   signs or symptoms of Covid 19.  Please respond to this text when you   receive it so we can follow up and keep you as safe as possible.   RESTRICTIONS:  During your procedure today, you received medications for sedation.  These   medications may affect your judgment, balance and coordination.  Therefore,   for 24 hours, you have the following restrictions:   - DO NOT drive a car, operate machinery, make legal/financial decisions,   sign important papers or drink alcohol.    ACTIVITY:  Today: no heavy lifting, straining or running due to procedural   sedation/anesthesia.  The following day: return to full activity including work.  DIET:  Eat and drink normally unless instructed otherwise.     TREATMENT FOR COMMON SIDE EFFECTS:  - Mild abdominal pain, nausea, belching, bloating or excessive gas:  rest,   eat lightly and use a heating pad.  - Sore Throat: treat with throat lozenges and/or gargle with warm salt   water.  - Because air was used during the procedure, expelling large amounts of air   from your rectum or belching is normal.  - If a bowel prep was taken, you may not have a bowel movement for 1-3 days.    This is normal.  SYMPTOMS TO WATCH FOR AND REPORT TO YOUR PHYSICIAN:  1. Abdominal pain or bloating, other than gas  cramps.  2. Chest pain.  3. Back pain.  4. Signs of infection such as: chills or fever occurring within 24 hours   after the procedure.  5. Rectal bleeding, which would show as bright red, maroon, or black stools.   (A tablespoon of blood from the rectum is not serious, especially if   hemorrhoids are present.)  6. Vomiting.  7. Weakness or dizziness.  GO DIRECTLY TO THE NEAREST EMERGENCY ROOM IF YOU HAVE ANY OF THE FOLLOWING:      Difficulty breathing              Chills and/or fever over 101 F   Persistent vomiting and/or vomiting blood   Severe abdominal pain   Severe chest pain   Black, tarry stools   Bleeding- more than one tablespoon   Any other symptom or condition that you feel may need urgent attention  Your doctor recommends these additional instructions:  If any biopsies were taken, your doctors clinic will contact you in 1 to 2   weeks with any results.  - Discharge patient to home.   - Full liquids with blended or minced solids.   - Initiate PPI BID; prescription sent to pharmacy  - Follow up with Dr. Tolliver for further evaluation and EGD with EoE biopsies   and likely TTS balloon dilation  For questions, problems or results please call your physician - Pascual Ho MD.  EMERGENCY PHONE NUMBER: [phone],  LAB RESULTS: [phone]  IF A COMPLICATION OR EMERGENCY SITUATION ARISES AND YOU ARE UNABLE TO REACH   YOUR PHYSICIAN - GO DIRECTLY TO THE EMERGENCY ROOM.  Pascual Ho MD  8/14/2024 10:39:30 PM  This report has been verified and signed electronically.  Dear patient,  As a result of recent federal legislation (The Federal Cures Act), you may   receive lab or pathology results from your procedure in your [facility]   account before your physician is able to contact you. Your physician or   their representative will relay the results to you with their   recommendations at their soonest availability.  Thank you,  PROVATION

## 2024-08-15 NOTE — ANESTHESIA POSTPROCEDURE EVALUATION
Anesthesia Post Evaluation    Patient: Bharti Woodard    Procedure(s) Performed: Procedure(s) (LRB):  EGD (ESOPHAGOGASTRODUODENOSCOPY) (N/A)    Final Anesthesia Type: general      Patient location during evaluation: GI PACU  Patient participation: Yes- Able to Participate  Level of consciousness: awake and alert and oriented  Post-procedure vital signs: reviewed and stable  Pain management: adequate  Airway patency: patent    PONV status at discharge: No PONV  Anesthetic complications: no      Cardiovascular status: hemodynamically stable  Respiratory status: unassisted  Hydration status: euvolemic  Follow-up not needed.              Vitals Value Taken Time   /59 08/14/24 2305   Temp 36.9 °C (98.4 °F) 08/14/24 2250   Pulse 77 08/14/24 2305   Resp 20 08/14/24 2305   SpO2 95 % 08/14/24 2305         Event Time   Out of Recovery 22:49:00         Pain/Harper Score: Harper Score: 10 (8/14/2024 11:05 PM)

## 2024-08-15 NOTE — TELEPHONE ENCOUNTER
----- Message from Stacia Ramirez LPN sent at 8/15/2024 12:11 PM CDT -----  Regarding: Follow up  Please contact pt to schedule follow up appointment.  Thanks.

## 2024-08-15 NOTE — TRANSFER OF CARE
"Anesthesia Transfer of Care Note    Patient: Bharti Woodard    Procedure(s) Performed: Procedure(s) (LRB):  EGD (ESOPHAGOGASTRODUODENOSCOPY) (N/A)    Patient location: PACU    Anesthesia Type: general    Transport from OR: Transported from OR on 6-10 L/min O2 by face mask with adequate spontaneous ventilation    Post pain: adequate analgesia    Post assessment: no apparent anesthetic complications    Post vital signs: stable    Level of consciousness: awake    Nausea/Vomiting: no nausea/vomiting    Complications: none    Transfer of care protocol was followed      Last vitals: Visit Vitals  BP (!) 148/65 (BP Location: Left arm, Patient Position: Lying)   Pulse 78   Temp 37 °C (98.6 °F) (Temporal)   Resp 18   Ht 5' 9" (1.753 m)   Wt 91.6 kg (201 lb 15.1 oz)   LMP 01/01/1976   SpO2 (!) 92%   BMI 29.82 kg/m²     "

## 2024-08-15 NOTE — TELEPHONE ENCOUNTER
MA returned patients call in regards to scheduling an appointment. LVM for her to call the office at her convenience.   Hospital follow up  (Patient can be scheduled with Ijeoma Santamaria PA-C)

## 2024-08-15 NOTE — ANESTHESIA PREPROCEDURE EVALUATION
08/14/2024  Bharti Woodard is a 73 y.o., female with dysphagia and globus sensation after eating steak earlier today.     3rd episode of this nature  Has been told she has esophageal stricture but refused dilation in past.     Hx of anesthetics in past without complications  NPO>8 hours  No food or drug allergies  Amenable to proceed with scheduled procedure    Procedure: EGD (ESOPHAGOGASTRODUODENOSCOPY) (N/A)         Patient Active Problem List   Diagnosis    Depression    Pulmonary hypertension    Sjoegren syndrome    ILD (interstitial lung disease)    Venous insufficiency of leg    Alveolar pneumopathy    Joint pain    Schwannoma    Elevated sed rate    Chronic respiratory failure    MCTD (mixed connective tissue disease)    Dysphagia    Vomiting    Chronic left shoulder pain    Muscle weakness    Left acoustic neuroma    Pain in both hands    Weakness    Stiffness due to immobility    Inflammatory neuropathy    PVD (peripheral vascular disease)    Current mild episode of major depressive disorder without prior episode    Lumbar radiculopathy    Pain in both lower extremities    Decreased strength    Decreased ROM of lumbar spine       Past Medical History:   Diagnosis Date    Depression     ILD (interstitial lung disease)     Pulmonary hypertension     Sjoegren syndrome        ECHO: No results found for this or any previous visit.      Body mass index is 29.82 kg/m².    Tobacco Use: Medium Risk (8/14/2024)    Patient History     Smoking Tobacco Use: Former     Smokeless Tobacco Use: Never     Passive Exposure: Not on file       Social History     Substance and Sexual Activity   Drug Use No        Alcohol Use: Not At Risk (8/21/2023)    Received from [facility], [facility]    AUDIT-C     Frequency of Alcohol Consumption: Monthly or less     Average Number of Drinks: 1 or 2     Frequency of Binge Drinking:  Never       Review of patient's allergies indicates:   Allergen Reactions    Nitrate analogues      Contraindication for pulmonary hypertension    Prednisone     Plaquenil [hydroxychloroquine] Rash         Airway:  No value filed.         Pre-op Assessment    I have reviewed the Patient Summary Reports.     I have reviewed the Nursing Notes. I have reviewed the NPO Status.   I have reviewed the Medications.     Review of Systems  Anesthesia Hx:  No problems with previous Anesthesia   History of prior surgery of interest to airway management or planning:          Denies Family Hx of Anesthesia complications.    Denies Personal Hx of Anesthesia complications.                    Cardiovascular:     Hypertension           hyperlipidemia                             Pulmonary:   Pulmonary Symptoms:         Pulmonary Fibrosis/Interstitial Disease:          Hepatic/GI:     GERD             Neurological:    Neuromuscular Disease,                                   Psych:  Psychiatric History                  Physical Exam  General: Well nourished, Cooperative, Alert and Oriented    Airway:  Mallampati: II / I  Mouth Opening: Normal  TM Distance: Normal  Tongue: Normal  Neck ROM: Normal ROM    Dental:  Intact, Periodontal disease        Anesthesia Plan  Type of Anesthesia, risks & benefits discussed:    Anesthesia Type: Gen ETT  Intra-op Monitoring Plan: Standard ASA Monitors  Post Op Pain Control Plan: multimodal analgesia and IV/PO Opioids PRN  Induction:  IV  Airway Plan: , Post-Induction  Informed Consent: Informed consent signed with the Patient and all parties understand the risks and agree with anesthesia plan.  All questions answered. Patient consented to blood products? No  ASA Score: 2 Emergent    Ready For Surgery From Anesthesia Perspective.     .

## 2024-08-15 NOTE — ED NOTES
Pt arrives to the ED with complaints of a foreign body being stuck. She states she was eating steak and vegetables around 4pm and something has been stuck since then. She denies trouble breathing, voice sounds clear, pt is spitting in an emesis bag and states that she has been spitting since 4pm. MORA, pt A&Ox4, denies any pain, she states that this has happened to her before. MD is at the bedside, no further needs at this time.

## 2024-08-19 ENCOUNTER — TELEPHONE (OUTPATIENT)
Dept: GASTROENTEROLOGY | Facility: CLINIC | Age: 74
End: 2024-08-19
Payer: MEDICARE

## 2024-08-19 NOTE — TELEPHONE ENCOUNTER
----- Message from Kirsten Saeid sent at 8/19/2024  8:25 AM CDT -----  Type:  Appointment Request    Name of Caller:CANDELARIA JARRELL [[id number]]    When is the first available appointment?No access    Symptoms:hospital follow up     Would the patient rather a call back or a response via [facility]? Call back     Best Call Back Number: [phone]    Additional Information: Patient states she would like to schedule an appointment with the provider. Patient states she was advised to follow up in regards to her discharge from the ED on 08/14. Patient states she would like a call back with assistance in scheduling.

## 2024-08-19 NOTE — TELEPHONE ENCOUNTER
MA returned patients call in regards to scheduling an appointment for a hospital follow up. Patient agreeable to see our physician assistant Ijeoma Santamaria. Appointment scheduled for 8/23/24.

## 2024-08-23 ENCOUNTER — OFFICE VISIT (OUTPATIENT)
Dept: GASTROENTEROLOGY | Facility: CLINIC | Age: 74
End: 2024-08-23
Payer: MEDICARE

## 2024-08-23 ENCOUNTER — TELEPHONE (OUTPATIENT)
Dept: ENDOSCOPY | Facility: HOSPITAL | Age: 74
End: 2024-08-23
Payer: MEDICARE

## 2024-08-23 VITALS
SYSTOLIC BLOOD PRESSURE: 102 MMHG | HEART RATE: 68 BPM | WEIGHT: 202.63 LBS | BODY MASS INDEX: 30.01 KG/M2 | DIASTOLIC BLOOD PRESSURE: 64 MMHG | HEIGHT: 69 IN

## 2024-08-23 DIAGNOSIS — R13.10 DYSPHAGIA, UNSPECIFIED TYPE: Primary | ICD-10-CM

## 2024-08-23 PROCEDURE — 99999 PR PBB SHADOW E&M-EST. PATIENT-LVL III: CPT | Mod: PBBFAC,,,

## 2024-08-23 NOTE — TELEPHONE ENCOUNTER
"----- Message from Sera Lima sent at 8/19/2024  8:37 AM CDT -----  Regarding: FW: EGD first available with Dr Belle endo 2 at [facility]    ----- Message -----  From: Dedra Tolliver MD  Sent: 8/15/2024   4:28 PM CDT  To: [facility]  Subject: EGD first available with Dr Belle endo#    Procedure: EGD with dilation    Diagnosis: Dysphagia    Procedure Timing: Within 4 weeks (Urgent)    #If within 4 weeks selected, please jaclyn as high priority#    #If greater than 12 weeks, please select "5-12 weeks" and delay sending until 3 months prior to requested date#     Location: [address]    Additional Scheduling Information: Advanced cardiac or pulmonary disease, needs 3 days of full liquids prior to due to previous food bolus impaction    Prep Specifications:Standard prep    Is the patient taking a GLP-1 Agonist:no    Have you attached a patient to this message: yes  "

## 2024-08-23 NOTE — TELEPHONE ENCOUNTER
Contacted the patient to schedule an endoscopy procedure(s) 8/23/24. The patient did not answer the call and left a voice message requesting a call back.

## 2024-08-23 NOTE — PROGRESS NOTES
"    [facility] Consultation Note    Reason for Consult:  The encounter diagnosis was Dysphagia, unspecified type.    PCP:   Nithin Edwarsd   No address on file    Referring MD:  Self, Aaareferral  No address on file    HPI:  This is a 73 y.o. female here for hospital f/u for dysphagia and esophageal food impaction 2/2 distal esophageal stenosis. Pt presented to ED on 8/14 after eating steak and vegetables and developed dysphagia that would not pass with fluids. She underwent EGD with food in esophagus, LA grade A esophagitis, large hiatal hernia, medium amount of food residue in stomach. She was directed to start full liquid diet with blended/minced solids, but states she has started to eat other things such as apples, grapes, and cucumbers. She is taking Protonix BID. Pt denies any other symptoms including regurgitation, nausea, and vomiting. Pt had similar presentation in 2021.     Objective Findings:    Vital Signs:  /64   Pulse 68   Ht 5' 9" (1.753 m)   Wt 91.9 kg (202 lb 9.6 oz)   LMP 01/01/1976   BMI 29.92 kg/m²   Body mass index is 29.92 kg/m².    Physical Exam:  General Appearance: Well appearing in no acute distress  Abdomen: Soft, non tender, non distended with positive bowel sounds in all four quadrants. No hepatosplenomegaly, ascites, or mass    Assessment:  1. Dysphagia, unspecified type      This is a 73 y.o F with recent ED presentation for dysphagia. EGD with food in esophagus, LA grade A esophagitis, large hiatal hernia, medium amount of food residue in stomach. Pt here to discuss and schedule repeat EGD with EoE biopsies.    - Ordered EGD with EoE biopsies and TTS balloon dilation  - Continue liquid diet with blended/minced solids  - Continue taking Protonix 40mg BID    Thank you so much for allowing me to participate in the care of Bharti Santamaria PA-C  [facility]      "

## 2024-08-23 NOTE — TELEPHONE ENCOUNTER
"----- Message from Ijeoma Santamaria PA-C sent at 8/23/2024  1:18 PM CDT -----  Regarding: EGD  Procedure: EGD with EoE biopsies and dilation     Diagnosis: Dysphagia    Procedure Timing: Within 4 weeks (urgent)    #If within 4 weeks selected, please jaclyn as high priority#    #If greater than 12 weeks, please select "5-12 weeks" and delay sending until 3 months prior to requested date#     Location: Hospital Based ([address])    Additional Scheduling Information: Advanced cardiac or pulmonary disease    Prep Specifications:Standard prep    Is the patient taking a GLP-1 Agonist:no    Have you attached a patient to this message: yes  "

## 2024-08-23 NOTE — TELEPHONE ENCOUNTER
Spoke to patient to schedule procedure(s) Upper Endoscopy (EGD)       Physician to perform procedure(s) Dr. CHEYANNE Tolliver   Date of Procedure (s) 11/11/2024  Arrival Time 6:30 am   Time of Procedure(s) 7:30 Am    Location of Procedure(s) [address]  Type of Rx Prep sent to patient: N/A  Instructions provided to patient via [facility]    Patient was informed on the following information and verbalized understanding. Screening questionnaire reviewed with patient and complete. If procedure requires anesthesia, a responsible adult needs to be present to accompany the patient home, patient cannot drive after receiving anesthesia. Appointment details are tentative, especially check-in time. Patient will receive a prep-op call 7 days prior to confirm check-in time for procedure. If applicable the patient should contact their pharmacy to verify Rx for procedure prep is ready for pick-up. Patient was advised to call the scheduling department at [phone] if pharmacy states no Rx is available. Patient was advised to call the endoscopy scheduling department if any questions or concerns arise.      [facility] Scheduling Department

## 2024-08-29 ENCOUNTER — OFFICE VISIT (OUTPATIENT)
Dept: RHEUMATOLOGY | Facility: CLINIC | Age: 74
End: 2024-08-29
Payer: MEDICARE

## 2024-08-29 VITALS
WEIGHT: 203 LBS | BODY MASS INDEX: 30.07 KG/M2 | HEART RATE: 61 BPM | DIASTOLIC BLOOD PRESSURE: 58 MMHG | HEIGHT: 69 IN | SYSTOLIC BLOOD PRESSURE: 109 MMHG

## 2024-08-29 DIAGNOSIS — M81.0 OSTEOPOROSIS, UNSPECIFIED OSTEOPOROSIS TYPE, UNSPECIFIED PATHOLOGICAL FRACTURE PRESENCE: ICD-10-CM

## 2024-08-29 DIAGNOSIS — M80.08XD FRACTURE OF VERTEBRA DUE TO OSTEOPOROSIS WITH ROUTINE HEALING, SUBSEQUENT ENCOUNTER: Primary | ICD-10-CM

## 2024-08-29 PROCEDURE — 1159F MED LIST DOCD IN RCRD: CPT | Mod: CPTII,S$GLB,, | Performed by: INTERNAL MEDICINE

## 2024-08-29 PROCEDURE — 3044F HG A1C LEVEL LT 7.0%: CPT | Mod: CPTII,S$GLB,, | Performed by: INTERNAL MEDICINE

## 2024-08-29 PROCEDURE — 1101F PT FALLS ASSESS-DOCD LE1/YR: CPT | Mod: CPTII,S$GLB,, | Performed by: INTERNAL MEDICINE

## 2024-08-29 PROCEDURE — 3288F FALL RISK ASSESSMENT DOCD: CPT | Mod: CPTII,S$GLB,, | Performed by: INTERNAL MEDICINE

## 2024-08-29 PROCEDURE — 1125F AMNT PAIN NOTED PAIN PRSNT: CPT | Mod: CPTII,S$GLB,, | Performed by: INTERNAL MEDICINE

## 2024-08-29 PROCEDURE — 99999 PR PBB SHADOW E&M-EST. PATIENT-LVL III: CPT | Mod: PBBFAC,,, | Performed by: INTERNAL MEDICINE

## 2024-08-29 PROCEDURE — 99215 OFFICE O/P EST HI 40 MIN: CPT | Mod: S$GLB,,, | Performed by: INTERNAL MEDICINE

## 2024-08-29 PROCEDURE — 3008F BODY MASS INDEX DOCD: CPT | Mod: CPTII,S$GLB,, | Performed by: INTERNAL MEDICINE

## 2024-08-29 PROCEDURE — 3078F DIAST BP <80 MM HG: CPT | Mod: CPTII,S$GLB,, | Performed by: INTERNAL MEDICINE

## 2024-08-29 PROCEDURE — 3074F SYST BP LT 130 MM HG: CPT | Mod: CPTII,S$GLB,, | Performed by: INTERNAL MEDICINE

## 2024-08-29 PROCEDURE — 1160F RVW MEDS BY RX/DR IN RCRD: CPT | Mod: CPTII,S$GLB,, | Performed by: INTERNAL MEDICINE

## 2024-08-29 ASSESSMENT — ROUTINE ASSESSMENT OF PATIENT INDEX DATA (RAPID3)
PSYCHOLOGICAL DISTRESS SCORE: 1.1
MDHAQ FUNCTION SCORE: 0.3
AM STIFFNESS SCORE: 1, YES
PATIENT GLOBAL ASSESSMENT SCORE: 2.5
FATIGUE SCORE: 2.5
PAIN SCORE: 2
TOTAL RAPID3 SCORE: 1.83

## 2024-08-29 NOTE — PROGRESS NOTES
Subjective:       Patient ID: Bharti Woodard is a 73 y.o. female.    Chief Complaint: Sjogrens and ILD    HPI:  Bharti Woodard is a 73 y.o. female with history of Sjogrens diagnosed 50-59 yo. She has difficulty remembering history.   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  Dr. Aldridge at [facility] saw her and diagnosed with lupus and gave prednisone and Plaquenil but she gained 60 pounds.    Saw Dr. Tirado a rheumatologist who diagnosed Sjogrens and sent her a pulmonologist.  Dr. Tirado gave her Mycophenolate.  Pulmonary HTN diagnosed 2010 with Dr. Evans.  Dr. Cassi Nunez rheumatologist at [facility] saw her and was planning to cut down on medication in future.  Patient decided to lower in her own so they felt she was noncompliant.  Patient decided to switch.   Saw Dr. Dunne who did not want to treat her since care needed to be coordinated with rheumatologist.  So she saw Dr. Cabrera and Dr. Herbert.      January 2020 felt very sick.  Tested negative for flu.  She was given antibiotics.  Since then has been fatigued with intermittent joint pains as well as tingling in her feet.     Tylenol 650 mg three times daily helps pain in neck, feet, ankle and hands. Nothing worsens pain.  Pain began after URI treated in January 2020.       Review of chart shows that patient was seen on 2/15/2005 by Dr. Lenora Ackerman in rheumatology for a second opinion.  Per that note patient had persistently elevate sedimentation rate and was diagnosed as PMR by Dr. Aldridge who treated her with prednisone that was later stopped.  Plaquenil was given but caused all over body rash.    Care Everywhere:  1/29/2019 Dr. Herbert note  Biopsy:   Biopsy at  10/2012: diffuse scattered non-necrotizing granulomas (special stains negative) with some areas of fibrosis, but mostly normal lung; scattered lymphoid aggregates    Labs:  November 2014: SSA positive  VENITA 1:1280, positive dsDNA, centromere and Sm/RNP, negative Sm, Scl70, RF, SSB       Had COVID  in June 2022 and was treated with Paxlovid.       INTERVAL HISTORY:    Couple weeks ago seen in ED for food stuck in throat and had scope to pull vegetable piece out.   Saw GI nurse practitioner last week.  She was scheduled for endoscopy and probably dilation.  She still works 2-3 days a week for 5 hours as a .     Feels intermittent fatigue but does not sleep.  Uses oxygen at night and when walking.    Still uses eye drops for lubricating 1-2 times day prn. Using Restasis. Eyes not as dry since potassium elevated.          Dr. Madiha Gipson made the following plan at last visit in July:     Plan:  CTD-ILD (HP)  Symptoms and PFT/6MWT stable. Intermittent dyspnea is related to volume status rather than ILD.  Continue MMF 1000 mg BID. Checking CBC/CMP today.  On 1L NC with exertion for goal O2 sats 88-92%. Asked her to buy a pulse ox to monitor O2 sats.  Encouraged increased frequency of her exercise regimen.  Following with [facility] rheumatology.  WHO group I PAH  FC I. REVEAL risk score 1 - low risk. BNP wnl at 36.  Continuing tadalafil 40 mg daily. No issues with hypotension.  ECHO does demonstrate slowly increasing ePASP up to 40 mmHg at this time, however, patient continues to be relatively asymptomatic. Will continue to monitor given low risk status.  Euvolemic on today's exam. On Bumex 2 mg BID.   Intermittent FERRARO associated with volume overload in the setting of salt load when eating out. Counseled on the importance of daily weights and low salt diet.  States that she is taking 1 tsp of potassium solution which is ~6 mEq. K+ is 3.5 on lab work. Will ask her to increase this to 1 tsp twice a day.   SISI: On CPAP nightly.  RTC in 6 months with PFT/6MWT     Madiha Gipson MD  [facility] PCCM Attending  [phone]         Review of Systems   Constitutional:  Positive for fatigue. Negative for fever and unexpected weight change.   HENT:  Negative for mouth sores and trouble swallowing.    Eyes:  Negative for  "redness.        Dry eyes   Respiratory:  Negative for cough and shortness of breath.    Cardiovascular:  Negative for chest pain.   Gastrointestinal:  Negative for constipation and diarrhea.        Trouble swallowing   Endocrine: Negative.    Genitourinary:  Negative for dysuria and genital sores.   Musculoskeletal:  Negative for arthralgias.   Skin:  Negative for rash.        Hair loss   Neurological:  Negative for headaches.   Hematological:  Does not bruise/bleed easily.         Objective:   BP (!) 109/58   Pulse 61   Ht 5' 9" (1.753 m)   Wt 92.1 kg (203 lb)   LMP 01/01/1976   BMI 29.98 kg/m²      Physical Exam   Constitutional: She is oriented to person, place, and time.   HENT:   Head: Normocephalic and atraumatic.   Eyes: Conjunctivae are normal.   Cardiovascular: Normal rate, regular rhythm and normal heart sounds.   Pulmonary/Chest: Effort normal and breath sounds normal.   Abdominal: Soft. Bowel sounds are normal.   Musculoskeletal:         General: No tenderness or deformity.      Cervical back: Neck supple.      Comments: Multiple Heberdens nodes  No pain on compression of MTPs bilateral   Neurological: She is alert and oriented to person, place, and time.   Skin: Skin is warm and dry.   Psychiatric: Mood and affect normal.       LABS    Component      Latest Ref Rng 8/13/2024   WBC      3.90 - 12.70 K/uL 5.68    WBC      3.90 - 12.70 K/uL 5.68    RBC      4.00 - 5.40 M/uL 3.49 (L)    RBC      4.00 - 5.40 M/uL 3.49 (L)    Hemoglobin      12.0 - 16.0 g/dL 11.4 (L)    Hemoglobin      12.0 - 16.0 g/dL 11.4 (L)    Hematocrit      37.0 - 48.5 % 35.1 (L)    Hematocrit      37.0 - 48.5 % 35.1 (L)    MCV      82 - 98 fL 101 (H)    MCV      82 - 98 fL 101 (H)    MCH      27.0 - 31.0 pg 32.7 (H)    MCH      27.0 - 31.0 pg 32.7 (H)    MCHC      32.0 - 36.0 g/dL 32.5    MCHC      32.0 - 36.0 g/dL 32.5    RDW      11.5 - 14.5 % 13.1    RDW      11.5 - 14.5 % 13.1    Platelet Count      150 - 450 K/uL 261  "   Platelet Count      150 - 450 K/uL 261    MPV      9.2 - 12.9 fL 9.0 (L)    MPV      9.2 - 12.9 fL 9.0 (L)    Immature Granulocytes      0.0 - 0.5 % 0.7 (H)    Immature Granulocytes      0.0 - 0.5 % 0.7 (H)    Gran # (ANC)      1.8 - 7.7 K/uL 3.7    Gran # (ANC)      1.8 - 7.7 K/uL 3.7    Immature Grans (Abs)      0.00 - 0.04 K/uL 0.04    Immature Grans (Abs)      0.00 - 0.04 K/uL 0.04    Lymph #      1.0 - 4.8 K/uL 1.3    Lymph #      1.0 - 4.8 K/uL 1.3    Mono #      0.3 - 1.0 K/uL 0.5    Mono #      0.3 - 1.0 K/uL 0.5    Eos #      0.0 - 0.5 K/uL 0.1    Eos #      0.0 - 0.5 K/uL 0.1    Baso #      0.00 - 0.20 K/uL 0.02    Baso #      0.00 - 0.20 K/uL 0.02    nRBC      0 /100 WBC 0    nRBC      0 /100 WBC 0    Gran %      38.0 - 73.0 % 65.1    Gran %      38.0 - 73.0 % 65.1    Lymph %      18.0 - 48.0 % 23.6    Lymph %      18.0 - 48.0 % 23.6    Mono %      4.0 - 15.0 % 8.6    Mono %      4.0 - 15.0 % 8.6    Eos %      0.0 - 8.0 % 1.6    Eos %      0.0 - 8.0 % 1.6    Basophil %      0.0 - 1.9 % 0.4    Basophil %      0.0 - 1.9 % 0.4    Differential Method Automated    Differential Method Automated    Sodium      136 - 145 mmol/L 138    Sodium      136 - 145 mmol/L 138    Potassium      3.5 - 5.1 mmol/L 3.8    Potassium      3.5 - 5.1 mmol/L 3.8    Chloride      95 - 110 mmol/L 100    Chloride      95 - 110 mmol/L 100    CO2      23 - 29 mmol/L 28    CO2      23 - 29 mmol/L 28    Glucose      70 - 110 mg/dL 111 (H)    Glucose      70 - 110 mg/dL 111 (H)    BUN      8 - 23 mg/dL 13    BUN      8 - 23 mg/dL 13    Creatinine      0.5 - 1.4 mg/dL 0.9    Creatinine      0.5 - 1.4 mg/dL 0.9    Calcium      8.7 - 10.5 mg/dL 9.4    Calcium      8.7 - 10.5 mg/dL 9.4    PROTEIN TOTAL      6.0 - 8.4 g/dL 6.9    PROTEIN TOTAL      6.0 - 8.4 g/dL 6.9    Albumin      3.5 - 5.2 g/dL 3.5    Albumin      3.5 - 5.2 g/dL 3.5    BILIRUBIN TOTAL      0.1 - 1.0 mg/dL 0.3    BILIRUBIN TOTAL      0.1 - 1.0 mg/dL 0.3    ALP      55 - 135  U/L 90    ALP      55 - 135 U/L 90    AST      10 - 40 U/L 14    AST      10 - 40 U/L 14    ALT      10 - 44 U/L 11    ALT      10 - 44 U/L 11    eGFR      >60 mL/min/1.73 m^2 >60.0    eGFR      >60 mL/min/1.73 m^2 >60.0    Anion Gap      8 - 16 mmol/L 10    Anion Gap      8 - 16 mmol/L 10    Specimen UA Urine, Clean Catch    Color, UA      Yellow, Straw, Alisa  Yellow    Appearance, UA      Clear  Clear    pH, UA      5.0 - 8.0  6.0    Spec Grav UA      1.005 - 1.030  1.015    Protein, UA      Negative  Negative    Glucose, UA      Negative  Negative    Ketones, UA      Negative  Negative    Bilirubin (UA)      Negative  Negative    Blood, UA      Negative  Negative    NITRITE UA      Negative  Positive !    Leukocyte Esterase, UA      Negative  Negative    RBC, UA      0 - 4 /hpf 1    WBC, UA      0 - 5 /hpf 4    Bacteria, UA      None-Occ /hpf Rare    Squam Epithel, UA      /hpf 13    Hyaline Casts, UA      0-1/lpf /lpf 3 !    Microscopic Comment SEE COMMENT    Urine Protein      0 - 15 mg/dL 7    Urine Creatinine      15.0 - 325.0 mg/dL 74.0    Urine Protein/Creatinine Ratio      0.00 - 0.20  0.09    ds DNA Ab      Negative 1:10  Negative 1:10    Complement (C-3)      50 - 180 mg/dL 137    Complement (C-4)      11 - 44 mg/dL 32    CPK      20 - 180 U/L 36    Sed Rate      0 - 36 mm/Hr 58 (H)    CRP      0.0 - 8.2 mg/L 10.6 (H)    Vitamin B12      210 - 950 pg/mL 1172 (H)    Folate      4.0 - 24.0 ng/mL >40.0 (H)       Legend:  (L) Low  (H) High  ! Abnormal   Assessment:       1. Sjogern's disease.  Dry eyes persist.  Uses lubricating drops prn.  Restasis daily.  Labs stable.  2. Pulmonary HTN.  Tadalafil with Dr. Gipson  3. Interstitial lung disease.  Continues on mycophenolate 1,000 mg bid managed by pulmonary Dr. Gipson.    Uses O2 with activity and sleep at 2-3l.  Had in January 2024  PFT, walk test and echo 7/2024.   4. Joint pain.  Hands, feet and neck.   5. Stress/Depression.  Encourage patient to  speak with primary  6. Immunosuppression  7. Former smoker.  3 packs a day for five years.  Quit in 1995  8. Left elbow medial pain.  Suggestive of golfers elbow.  9. Right knee and lower leg pain.  With weather changes  10. Osteoporosis.  Based on L1 compression on x-ray newly developed in 8/2021.   Repeat bone density 1/2024  11. Swallowing issue.  Treated by dilation in the past    Plan:       Labs in 1 month.  Check DXA.    Follow with pulmonary for Cellcept.  Will not add Plaquenil since she had all over body rash in past.   Consider using O2 daily  OTC Voltaren gel for elbow and intermittent knee pain. Ice 2-3x a day.   Continue Tylenol not to exceed 3,000 mg daily  Continue lubricating eye drops three time daily.  Using Restasis.    Calcium 1,000 mg daily and vitamin D 1,000 unit daily  Patient dentist objected to her using bisphosphonate or denosumab IV.  Patient following with dentist still has not pulled teeth and 1 crown still needs to placed.   Fall prevention discussed  Continue exercises for hand from OT and start paraffin wax weekly.       RTO 4 months/prn

## 2024-09-12 ENCOUNTER — LAB VISIT (OUTPATIENT)
Dept: LAB | Facility: HOSPITAL | Age: 74
End: 2024-09-12
Attending: INTERNAL MEDICINE
Payer: MEDICARE

## 2024-09-12 DIAGNOSIS — Z79.899 IMMUNOSUPPRESSION DUE TO DRUG THERAPY: ICD-10-CM

## 2024-09-12 DIAGNOSIS — R53.83 FATIGUE, UNSPECIFIED TYPE: ICD-10-CM

## 2024-09-12 DIAGNOSIS — M35.00 SJOGREN'S SYNDROME, WITH UNSPECIFIED ORGAN INVOLVEMENT: ICD-10-CM

## 2024-09-12 DIAGNOSIS — J84.9 ILD (INTERSTITIAL LUNG DISEASE): ICD-10-CM

## 2024-09-12 DIAGNOSIS — I27.20 PULMONARY HYPERTENSION: ICD-10-CM

## 2024-09-12 DIAGNOSIS — D84.821 IMMUNOSUPPRESSION DUE TO DRUG THERAPY: ICD-10-CM

## 2024-09-12 LAB
ALBUMIN SERPL BCP-MCNC: 3.6 G/DL (ref 3.5–5.2)
ALP SERPL-CCNC: 100 U/L (ref 55–135)
ALT SERPL W/O P-5'-P-CCNC: 9 U/L (ref 10–44)
ANION GAP SERPL CALC-SCNC: 12 MMOL/L (ref 8–16)
AST SERPL-CCNC: 14 U/L (ref 10–40)
BASOPHILS # BLD AUTO: 0.02 K/UL (ref 0–0.2)
BASOPHILS NFR BLD: 0.3 % (ref 0–1.9)
BILIRUB SERPL-MCNC: 0.4 MG/DL (ref 0.1–1)
BUN SERPL-MCNC: 15 MG/DL (ref 8–23)
C3 SERPL-MCNC: 139 MG/DL (ref 50–180)
C4 SERPL-MCNC: 30 MG/DL (ref 11–44)
CALCIUM SERPL-MCNC: 9.5 MG/DL (ref 8.7–10.5)
CHLORIDE SERPL-SCNC: 99 MMOL/L (ref 95–110)
CK SERPL-CCNC: 40 U/L (ref 20–180)
CO2 SERPL-SCNC: 30 MMOL/L (ref 23–29)
CREAT SERPL-MCNC: 0.9 MG/DL (ref 0.5–1.4)
CRP SERPL-MCNC: 40.2 MG/L (ref 0–8.2)
DIFFERENTIAL METHOD BLD: ABNORMAL
EOSINOPHIL # BLD AUTO: 0.1 K/UL (ref 0–0.5)
EOSINOPHIL NFR BLD: 0.8 % (ref 0–8)
ERYTHROCYTE [DISTWIDTH] IN BLOOD BY AUTOMATED COUNT: 13.2 % (ref 11.5–14.5)
ERYTHROCYTE [SEDIMENTATION RATE] IN BLOOD BY PHOTOMETRIC METHOD: 75 MM/HR (ref 0–36)
EST. GFR  (NO RACE VARIABLE): >60 ML/MIN/1.73 M^2
GLUCOSE SERPL-MCNC: 103 MG/DL (ref 70–110)
HCT VFR BLD AUTO: 36 % (ref 37–48.5)
HGB BLD-MCNC: 10.9 G/DL (ref 12–16)
IMM GRANULOCYTES # BLD AUTO: 0.04 K/UL (ref 0–0.04)
IMM GRANULOCYTES NFR BLD AUTO: 0.6 % (ref 0–0.5)
LYMPHOCYTES # BLD AUTO: 1.5 K/UL (ref 1–4.8)
LYMPHOCYTES NFR BLD: 21.5 % (ref 18–48)
MCH RBC QN AUTO: 31.1 PG (ref 27–31)
MCHC RBC AUTO-ENTMCNC: 30.3 G/DL (ref 32–36)
MCV RBC AUTO: 103 FL (ref 82–98)
MONOCYTES # BLD AUTO: 0.6 K/UL (ref 0.3–1)
MONOCYTES NFR BLD: 8.7 % (ref 4–15)
NEUTROPHILS # BLD AUTO: 4.9 K/UL (ref 1.8–7.7)
NEUTROPHILS NFR BLD: 68.1 % (ref 38–73)
NRBC BLD-RTO: 0 /100 WBC
PLATELET # BLD AUTO: 234 K/UL (ref 150–450)
PMV BLD AUTO: 9.5 FL (ref 9.2–12.9)
POTASSIUM SERPL-SCNC: 3.1 MMOL/L (ref 3.5–5.1)
PROT SERPL-MCNC: 7.1 G/DL (ref 6–8.4)
RBC # BLD AUTO: 3.51 M/UL (ref 4–5.4)
SODIUM SERPL-SCNC: 141 MMOL/L (ref 136–145)
WBC # BLD AUTO: 7.12 K/UL (ref 3.9–12.7)

## 2024-09-12 PROCEDURE — 36415 COLL VENOUS BLD VENIPUNCTURE: CPT | Performed by: INTERNAL MEDICINE

## 2024-09-12 PROCEDURE — 86160 COMPLEMENT ANTIGEN: CPT | Mod: 59 | Performed by: INTERNAL MEDICINE

## 2024-09-12 PROCEDURE — 86160 COMPLEMENT ANTIGEN: CPT | Performed by: INTERNAL MEDICINE

## 2024-09-12 PROCEDURE — 86225 DNA ANTIBODY NATIVE: CPT | Performed by: INTERNAL MEDICINE

## 2024-09-12 PROCEDURE — 82550 ASSAY OF CK (CPK): CPT | Performed by: INTERNAL MEDICINE

## 2024-09-12 PROCEDURE — 85652 RBC SED RATE AUTOMATED: CPT | Performed by: INTERNAL MEDICINE

## 2024-09-12 PROCEDURE — 80053 COMPREHEN METABOLIC PANEL: CPT | Performed by: INTERNAL MEDICINE

## 2024-09-12 PROCEDURE — 85025 COMPLETE CBC W/AUTO DIFF WBC: CPT | Performed by: INTERNAL MEDICINE

## 2024-09-12 PROCEDURE — 86140 C-REACTIVE PROTEIN: CPT | Performed by: INTERNAL MEDICINE

## 2024-09-13 ENCOUNTER — PATIENT MESSAGE (OUTPATIENT)
Dept: RHEUMATOLOGY | Facility: CLINIC | Age: 74
End: 2024-09-13
Payer: MEDICARE

## 2024-09-13 LAB — DSDNA AB SER-ACNC: NORMAL [IU]/ML

## 2024-09-18 ENCOUNTER — HOSPITAL ENCOUNTER (OUTPATIENT)
Dept: RADIOLOGY | Facility: HOSPITAL | Age: 74
Discharge: HOME | End: 2024-09-18
Attending: INTERNAL MEDICINE
Payer: MEDICARE

## 2024-09-18 DIAGNOSIS — M81.0 OSTEOPOROSIS, UNSPECIFIED OSTEOPOROSIS TYPE, UNSPECIFIED PATHOLOGICAL FRACTURE PRESENCE: ICD-10-CM

## 2024-09-18 DIAGNOSIS — M80.08XD FRACTURE OF VERTEBRA DUE TO OSTEOPOROSIS WITH ROUTINE HEALING, SUBSEQUENT ENCOUNTER: ICD-10-CM

## 2024-09-18 PROCEDURE — 77080 DXA BONE DENSITY AXIAL: CPT | Mod: TC

## 2024-09-19 ENCOUNTER — PATIENT MESSAGE (OUTPATIENT)
Dept: RHEUMATOLOGY | Facility: CLINIC | Age: 74
End: 2024-09-19
Payer: MEDICARE

## 2024-09-27 ENCOUNTER — PATIENT MESSAGE (OUTPATIENT)
Dept: RHEUMATOLOGY | Facility: CLINIC | Age: 74
End: 2024-09-27
Payer: MEDICARE

## 2024-10-01 ENCOUNTER — TELEPHONE (OUTPATIENT)
Dept: ENDOSCOPY | Facility: HOSPITAL | Age: 74
End: 2024-10-01
Payer: MEDICARE

## 2024-10-01 NOTE — TELEPHONE ENCOUNTER
----- Message from Med Assistant Morin sent at 8/26/2024  8:44 AM CDT -----  Regarding: FW: 11/11 CL  From: Mikel Almanza MA  Sent: 8/23/2024   2:04 PM CDT  To: #  Subject: 11/11 CL                                         The patient is currently under an external pulmonologist Dr. Madiha weldon and requires a clearance Pulmonology  for their upcoming scheduled Upper Endoscopy (EGD) on 11/11/2024.       External provider information:  Pulmonology  Madiha Gipson MD   [address] [place] [address]   [phone] (Work)   [phone] (Fax)     Notes:  ----- Message -----  From: Mikel Almanza MA  Sent: 8/23/2024   2:04 PM CDT  To: #  Subject: 11/11 CL                                         The patient is currently under an external pulmonologist Dr. Madiha weldon and requires a clearance Pulmonology  for their upcoming scheduled Upper Endoscopy (EGD) on 11/11/2024.       External provider information:  Pulmonology  Madiha Gipson MD   [address] [place] [address]   [phone] (Work)   [phone] (Fax)     Notes:

## 2024-11-04 ENCOUNTER — TELEPHONE (OUTPATIENT)
Dept: ENDOSCOPY | Facility: HOSPITAL | Age: 74
End: 2024-11-04
Payer: MEDICARE

## 2024-11-04 ENCOUNTER — PATIENT MESSAGE (OUTPATIENT)
Dept: ENDOSCOPY | Facility: HOSPITAL | Age: 74
End: 2024-11-04
Payer: MEDICARE

## 2024-11-04 NOTE — TELEPHONE ENCOUNTER
Spoke to pt for pre-call to confirm scheduled Upper Endoscopy (EGD) and patient verbalized understanding of the following:       Date of Procedure (s)  verified 11/11/24  Arrival Time 6:30 AM verified.  Location of Procedure(s) [address] verified.  NPO status reinforced. Ok to continue clear liquids up until 2 hours prior to the Endoscopy procedure.   patient denies taking blood thinning or glp1 medications  Pt confirmed receipt of prep instructions and Rx prep (if applicable).  Instructions provided to patient via [facility]  Pt confirmed ride home after procedure if procedure requires anesthesia.   Pre-call screening questionnaire reviewed and completed with patient.   Appointment details are tentative, including check-in time.  If the patient begins taking any blood thinning medications, injectable weight loss/diabetes medications (other than insulin), or Adipex (phentermine) patient was instructed to contact the endoscopy scheduling department as soon as possible.  Patient was advised to call the endoscopy scheduling department if any questions or concerns arise.       [facility] Scheduling Department

## 2024-11-06 ENCOUNTER — TELEPHONE (OUTPATIENT)
Dept: ENDOSCOPY | Facility: HOSPITAL | Age: 74
End: 2024-11-06
Payer: MEDICARE

## 2024-11-06 NOTE — TELEPHONE ENCOUNTER
Patient called with questions about prep. Questions answered and patient verbalized understanding. Encouraged to call back for any needs or questions.

## 2024-11-11 ENCOUNTER — ANESTHESIA EVENT (OUTPATIENT)
Dept: ENDOSCOPY | Facility: HOSPITAL | Age: 74
End: 2024-11-11
Payer: MEDICARE

## 2024-11-11 ENCOUNTER — ANESTHESIA (OUTPATIENT)
Dept: ENDOSCOPY | Facility: HOSPITAL | Age: 74
End: 2024-11-11
Payer: MEDICARE

## 2024-11-11 ENCOUNTER — HOSPITAL ENCOUNTER (OUTPATIENT)
Facility: HOSPITAL | Age: 74
Discharge: HOME | End: 2024-11-11
Attending: INTERNAL MEDICINE | Admitting: INTERNAL MEDICINE
Payer: MEDICARE

## 2024-11-11 VITALS
HEIGHT: 69 IN | BODY MASS INDEX: 29.33 KG/M2 | SYSTOLIC BLOOD PRESSURE: 144 MMHG | OXYGEN SATURATION: 100 % | WEIGHT: 198 LBS | HEART RATE: 49 BPM | RESPIRATION RATE: 19 BRPM | DIASTOLIC BLOOD PRESSURE: 61 MMHG | TEMPERATURE: 98 F

## 2024-11-11 DIAGNOSIS — R13.10 DYSPHAGIA: ICD-10-CM

## 2024-11-11 LAB — KOH PREP SPEC: NORMAL

## 2024-11-11 PROCEDURE — 88305 TISSUE EXAM BY PATHOLOGIST: CPT | Mod: 59 | Performed by: PATHOLOGY

## 2024-11-11 PROCEDURE — 27201012 HC FORCEPS, HOT/COLD, DISP: Performed by: INTERNAL MEDICINE

## 2024-11-11 PROCEDURE — 27200946 HC BRUSH, CYTOLOGY: Performed by: INTERNAL MEDICINE

## 2024-11-11 PROCEDURE — 63600175 PHARM REV CODE 636 W HCPCS: Performed by: NURSE ANESTHETIST, CERTIFIED REGISTERED

## 2024-11-11 PROCEDURE — 37000008 HC ANESTHESIA 1ST 15 MINUTES: Performed by: INTERNAL MEDICINE

## 2024-11-11 PROCEDURE — 88312 SPECIAL STAINS GROUP 1: CPT | Performed by: PATHOLOGY

## 2024-11-11 PROCEDURE — 37000009 HC ANESTHESIA EA ADD 15 MINS: Performed by: INTERNAL MEDICINE

## 2024-11-11 PROCEDURE — 87210 SMEAR WET MOUNT SALINE/INK: CPT | Performed by: INTERNAL MEDICINE

## 2024-11-11 PROCEDURE — 43239 EGD BIOPSY SINGLE/MULTIPLE: CPT | Performed by: INTERNAL MEDICINE

## 2024-11-11 PROCEDURE — 43239 EGD BIOPSY SINGLE/MULTIPLE: CPT | Mod: ,,, | Performed by: INTERNAL MEDICINE

## 2024-11-11 RX ORDER — SODIUM CHLORIDE 0.9 % (FLUSH) 0.9 %
10 SYRINGE (ML) INJECTION
Status: DISCONTINUED | OUTPATIENT
Start: 2024-11-11 | End: 2024-11-11 | Stop reason: HOSPADM

## 2024-11-11 RX ORDER — PANTOPRAZOLE SODIUM 40 MG/1
40 TABLET, DELAYED RELEASE ORAL 2 TIMES DAILY
Qty: 180 TABLET | Refills: 3 | Status: SHIPPED | OUTPATIENT
Start: 2024-11-11

## 2024-11-11 RX ORDER — GLUCAGON 1 MG
1 KIT INJECTION
Status: DISCONTINUED | OUTPATIENT
Start: 2024-11-11 | End: 2024-11-11 | Stop reason: HOSPADM

## 2024-11-11 RX ORDER — SODIUM CHLORIDE 9 MG/ML
INJECTION, SOLUTION INTRAVENOUS CONTINUOUS
Status: DISCONTINUED | OUTPATIENT
Start: 2024-11-11 | End: 2024-11-11 | Stop reason: HOSPADM

## 2024-11-11 RX ORDER — HYDROMORPHONE HYDROCHLORIDE 1 MG/ML
0.2 INJECTION, SOLUTION INTRAMUSCULAR; INTRAVENOUS; SUBCUTANEOUS EVERY 5 MIN PRN
Status: DISCONTINUED | OUTPATIENT
Start: 2024-11-11 | End: 2024-11-11 | Stop reason: HOSPADM

## 2024-11-11 RX ORDER — LIDOCAINE HYDROCHLORIDE 20 MG/ML
INJECTION INTRAVENOUS
Status: DISCONTINUED | OUTPATIENT
Start: 2024-11-11 | End: 2024-11-11

## 2024-11-11 RX ORDER — PROPOFOL 10 MG/ML
VIAL (ML) INTRAVENOUS
Status: DISCONTINUED | OUTPATIENT
Start: 2024-11-11 | End: 2024-11-11

## 2024-11-11 RX ADMIN — PROPOFOL 150 MCG/KG/MIN: 10 INJECTION, EMULSION INTRAVENOUS at 07:11

## 2024-11-11 RX ADMIN — PROPOFOL 30 MG: 10 INJECTION, EMULSION INTRAVENOUS at 07:11

## 2024-11-11 RX ADMIN — PROPOFOL 70 MG: 10 INJECTION, EMULSION INTRAVENOUS at 07:11

## 2024-11-11 RX ADMIN — LIDOCAINE HYDROCHLORIDE 70 MG: 20 INJECTION INTRAVENOUS at 07:11

## 2024-11-11 NOTE — ANESTHESIA POSTPROCEDURE EVALUATION
Anesthesia Post Evaluation    Patient: Bharti Woodard    Procedure(s) Performed: Procedure(s) (LRB):  EGD (ESOPHAGOGASTRODUODENOSCOPY) (N/A)    Final Anesthesia Type: general      Patient location during evaluation: PACU  Patient participation: Yes- Able to Participate  Level of consciousness: awake and alert and oriented  Post-procedure vital signs: reviewed and stable  Pain management: adequate  Airway patency: patent    PONV status at discharge: No PONV  Anesthetic complications: no      Cardiovascular status: blood pressure returned to baseline and hemodynamically stable  Respiratory status: unassisted and spontaneous ventilation  Hydration status: euvolemic  Follow-up not needed.              Vitals Value Taken Time   /61 11/11/24 0847   Temp 36.9 °C (98.4 °F) 11/11/24 0808   Pulse 51 11/11/24 0849   Resp 24 11/11/24 0849   SpO2 100 % 11/11/24 0848   Vitals shown include unfiled device data.      No case tracking events are documented in the log.      Pain/Harper Score: Harper Score: 9 (11/11/2024  8:30 AM)

## 2024-11-11 NOTE — TRANSFER OF CARE
"Anesthesia Transfer of Care Note    Patient: Bharti Woodard    Procedure(s) Performed: Procedure(s) (LRB):  EGD (ESOPHAGOGASTRODUODENOSCOPY) (N/A)    Patient location: PACU    Anesthesia Type: general    Transport from OR: Transported from OR on 2-3 L/min O2 by NC with adequate spontaneous ventilation    Post pain: adequate analgesia    Post assessment: no apparent anesthetic complications and tolerated procedure well    Post vital signs: stable    Level of consciousness: responds to stimulation and sedated    Nausea/Vomiting: no nausea/vomiting    Complications: none    Transfer of care protocol was followed      Last vitals: Visit Vitals  /60   Pulse 62   Temp 37 °C (98.6 °F) (Oral)   Resp 18   Ht 5' 9" (1.753 m)   Wt 89.8 kg (198 lb)   LMP 01/01/1976   SpO2 (!) 92%   Breastfeeding No   BMI 29.24 kg/m²     "

## 2024-11-11 NOTE — PROVATION PATIENT INSTRUCTIONS
Discharge Summary/Instructions after an Endoscopic Procedure  Patient Name: Bharti Woodard  Patient MRN: [id number]  Patient YOB: 1950 Monday, November 11, 2024  Dedra Tolliver MD  Dear patient,  As a result of recent federal legislation (The Federal Cures Act), you may   receive lab or pathology results from your procedure in your [facility]   account before your physician is able to contact you. Your physician or   their representative will relay the results to you with their   recommendations at their soonest availability.  Thank you,  RESTRICTIONS:  During your procedure today, you received medications for sedation.  These   medications may affect your judgment, balance and coordination.  Therefore,   for 24 hours, you have the following restrictions:   - DO NOT drive a car, operate machinery, make legal/financial decisions,   sign important papers or drink alcohol.    ACTIVITY:  Today: no heavy lifting, straining or running due to procedural   sedation/anesthesia.  The following day: return to full activity including work.  DIET:  Eat and drink normally unless instructed otherwise.     TREATMENT FOR COMMON SIDE EFFECTS:  - Mild abdominal pain, nausea, belching, bloating or excessive gas:  rest,   eat lightly and use a heating pad.  - Sore Throat: treat with throat lozenges and/or gargle with warm salt   water.  - Because air was used during the procedure, expelling large amounts of air   from your rectum or belching is normal.  - If a bowel prep was taken, you may not have a bowel movement for 1-3 days.    This is normal.  SYMPTOMS TO WATCH FOR AND REPORT TO YOUR PHYSICIAN:  1. Abdominal pain or bloating, other than gas cramps.  2. Chest pain.  3. Back pain.  4. Signs of infection such as: chills or fever occurring within 24 hours   after the procedure.  5. Rectal bleeding, which would show as bright red, maroon, or black stools.   (A tablespoon of blood from the rectum is not serious, especially if    hemorrhoids are present.)  6. Vomiting.  7. Weakness or dizziness.  GO DIRECTLY TO THE NEAREST EMERGENCY ROOM IF YOU HAVE ANY OF THE FOLLOWING:      Difficulty breathing              Chills and/or fever over 101 F   Persistent vomiting and/or vomiting blood   Severe abdominal pain   Severe chest pain   Black, tarry stools   Bleeding- more than one tablespoon   Any other symptom or condition that you feel may need urgent attention  Your doctor recommends these additional instructions:  If any biopsies were taken, your doctors clinic will contact you in 1 to 2   weeks with any results.  - Discharge patient to home.   - Resume previous diet.   - Continue present medications.   - Await pathology results.  For questions, problems or results please call your physician - Dedra Tolliver MD at Work:  [phone].  [facility], EMERGENCY ROOM PHONE NUMBER: [phone]  IF A COMPLICATION OR EMERGENCY SITUATION ARISES AND YOU ARE UNABLE TO REACH   YOUR PHYSICIAN - GO DIRECTLY TO THE EMERGENCY ROOM.  Dedra Tolliver MD  11/11/2024 7:59:52 AM  This report has been verified and signed electronically.  Dear patient,  As a result of recent federal legislation (The Federal Cures Act), you may   receive lab or pathology results from your procedure in your [facility]   account before your physician is able to contact you. Your physician or   their representative will relay the results to you with their   recommendations at their soonest availability.  Thank you,  PROVATION

## 2024-11-11 NOTE — H&P
Short Stay Endoscopy History and Physical    PCP - Nithin Edwards MD     Procedure - EGD  ASA - per anesthesia  Mallampati - per anesthesia  History of Anesthesia problems - no  Family history Anesthesia problems -  no   Plan of anesthesia - General    HPI:  This is a 74 y.o. female here for evaluation of : dysphagia      ROS:  Constitutional: No fevers, chills, No weight loss  CV: No chest pain  Pulm: No cough, No shortness of breath  Ophtho: No vision changes  GI: see HPI  Derm: No rash    Medical History:  has a past medical history of Asthma, Depression, Dysphagia, History of colonic polyps, ILD (interstitial lung disease), Pulmonary hypertension, and Sjoegren syndrome.    Surgical History:  has a past surgical history that includes Lung biopsy; Cardiac catheterization (Right, 2016); Hysterectomy; Cataract surgery (Bilateral); Esophagogastroduodenoscopy (N/A, 6/15/2021); Esophagogastroduodenoscopy (N/A, 8/3/2021); Colonoscopy (N/A, 8/3/2021); Esophageal manometry with measurement of impedance (N/A, 8/3/2021); and Esophagogastroduodenoscopy (N/A, 8/14/2024).    Family History: family history includes Breast cancer in her maternal aunt and sister; Cancer in her maternal grandmother; Diabetes in her brother, father, mother, and sister; Heart disease in her mother; Stroke in her brother.. Otherwise no colon cancer, inflammatory bowel disease, or GI malignancies.    Social History:  reports that she quit smoking about 29 years ago. Her smoking use included cigarettes. She started smoking about 34 years ago. She has a 15 pack-year smoking history. She has never used smokeless tobacco. She reports current alcohol use. She reports that she does not use drugs.    Review of patient's allergies indicates:   Allergen Reactions    Nitrate analogues      Contraindication for pulmonary hypertension    Prednisone     Plaquenil [hydroxychloroquine] Rash       Medications:   Medications Prior to Admission   Medication Sig  Dispense Refill Last Dose/Taking    b complex vitamins capsule Take 1 capsule by mouth once daily.   11/10/2024    bumetanide (BUMEX) 1 MG tablet TAKE 2 TABLETS BY MOUTH TWICE A  tablet 3 11/10/2024    buPROPion (WELLBUTRIN XL) 150 MG TB24 tablet TAKE 1 TABLET BY MOUTH EVERY DAY 90 tablet 2 11/10/2024    CALCIUM 500 500 mg calcium (1,250 mg) tablet    11/10/2024    gabapentin (NEURONTIN) 300 MG capsule Take 1 capsule (300 mg total) by mouth 3 (three) times daily. 90 capsule 11 11/10/2024    multivitamin capsule    11/10/2024    mycophenolate (CELLCEPT) 500 mg Tab Take 1,000 mg by mouth 2 (two) times daily.   2024 Morning    pantoprazole (PROTONIX) 40 MG tablet Take 1 tablet (40 mg total) by mouth 2 (two) times daily. 180 tablet 0 11/10/2024    potassium chloride 10% (KAYCIEL) 20 mEq/15 mL oral solution Take 7.5 mLs (10 mEq total) by mouth once daily. 473 mL 4 11/10/2024    RESTASIS 0.05 % ophthalmic emulsion Apply 1 drop to eye 2 (two) times daily.  11 2024 Morning    SYSTANE ULTRA 0.4-0.3 % Drop SMARTSI Drop(s) In Eye(s) 6 Times Daily   2024 at  5:00 AM    tadalafil (ADCIRCA) 20 mg Tab Take 40 mg by mouth once daily.    2024 at  5:00 AM    triamcinolone acetonide 0.1% (KENALOG) 0.1 % paste Apply 1 application topically daily as needed.   Past Month    TYLENOL 325 mg Cap    11/10/2024    albuterol (VENTOLIN HFA) 90 mcg/actuation inhaler Inhale 2 puffs into the lungs every 4 (four) hours as needed for Shortness of Breath or Wheezing. 18 g 1 More than a month    COVID-19 (COMIRNATY -, 12Y UP,,PF,) 30 mcg/0.3 mL IM vaccine (>/= 13 yo) Inject into the muscle. 0.3 mL 0     meclizine (ANTIVERT) 12.5 mg tablet Take 1-2 tablets (12.5-25 mg total) by mouth 2 (two) times daily as needed for Dizziness. 60 tablet 0 More than a month    [place]IV[place] [place] Exam:    Vital Signs: There were no vitals filed for this visit.  Labs:  Lab Results   Component Value  Date    WBC 5.27 09/18/2024    HGB 11.6 (L) 09/18/2024    HCT 37.3 09/18/2024     09/18/2024    CHOL 213 (H) 10/12/2020    TRIG 147 10/12/2020    HDL 45 10/12/2020    ALT 10 09/18/2024    AST 15 09/18/2024     09/18/2024    K 3.4 (L) 09/18/2024    CL 99 09/18/2024    CREATININE 0.9 09/18/2024    BUN 15 09/18/2024    CO2 33 (H) 09/18/2024    TSH 1.596 06/22/2023    INR 1.1 12/08/2010    HGBA1C 5.8 (H) 01/16/2024       I have explained the risks and benefits of endoscopy procedures to the patient including but not limited to bleeding, perforation, infection, and death.  The patient was asked if they understand and allowed to ask any further questions to their satisfaction.      Marie Fields MD

## 2024-11-11 NOTE — ANESTHESIA PREPROCEDURE EVALUATION
11/11/2024  Bharti Woodard is a 74 y.o., female with dysphagia, esophageal stricture and eosinophilic esophagitis.    Has hx of pulmonary HTN and uses O2at night. Baseline saturation high 80's-low 90's.          Procedure: EGD (ESOPHAGOGASTRODUODENOSCOPY) (N/A)         Patient Active Problem List   Diagnosis    Depression    Pulmonary hypertension    Sjoegren syndrome    ILD (interstitial lung disease)    Venous insufficiency of leg    Alveolar pneumopathy    Joint pain    Schwannoma    Elevated sed rate    Chronic respiratory failure    MCTD (mixed connective tissue disease)    Dysphagia    Vomiting    Chronic left shoulder pain    Muscle weakness    Left acoustic neuroma    Pain in both hands    Weakness    Stiffness due to immobility    Inflammatory neuropathy    PVD (peripheral vascular disease)    Current mild episode of major depressive disorder without prior episode    Lumbar radiculopathy    Pain in both lower extremities    Decreased strength    Decreased ROM of lumbar spine    Food bolus obstruction of intestine       Past Medical History:   Diagnosis Date    Depression     ILD (interstitial lung disease)     Pulmonary hypertension     Sjoegren syndrome        ECHO: No results found for this or any previous visit.      There is no height or weight on file to calculate BMI.    Tobacco Use: Medium Risk (11/11/2024)    Patient History     Smoking Tobacco Use: Former     Smokeless Tobacco Use: Never     Passive Exposure: Not on file       Social History     Substance and Sexual Activity   Drug Use No        Alcohol Use: Not At Risk (8/21/2023)    [facility], [facility]    AUDIT-C     Frequency of Alcohol Consumption: Monthly or less     Average Number of Drinks: 1 or 2     Frequency of Binge Drinking: Never       Review of patient's allergies indicates:   Allergen Reactions    Nitrate  analogues      Contraindication for pulmonary hypertension    Prednisone     Plaquenil [hydroxychloroquine] Rash         Airway:  No value filed.         Pre-op Assessment    I have reviewed the Patient Summary Reports.     I have reviewed the Nursing Notes. I have reviewed the NPO Status.   I have reviewed the Medications.     Review of Systems  Anesthesia Hx:  No problems with previous Anesthesia   History of prior surgery of interest to airway management or planning:          Denies Family Hx of Anesthesia complications.    Denies Personal Hx of Anesthesia complications.                    Social:  Non-Smoker       Cardiovascular:     Hypertension           hyperlipidemia                               Pulmonary:    Asthma   Denies Shortness of breath.   Denies SOB today; does use Oxygen at night. Able to walk > 4 METS without supplemental O2 Pulmonary Symptoms:         Pulmonary Fibrosis/Interstitial Disease:          Hepatic/GI:     GERD                Neurological:    Neuromuscular Disease,                                   Psych:  Psychiatric History                  Physical Exam  General: Well nourished, Cooperative, Alert and Oriented    Airway:  Mallampati: II / I  Mouth Opening: Normal  TM Distance: Normal  Tongue: Normal  Neck ROM: Normal ROM    Dental:  Intact, Periodontal disease        Anesthesia Plan  Type of Anesthesia, risks & benefits discussed:    Anesthesia Type: Gen ETT  Intra-op Monitoring Plan: Standard ASA Monitors  Post Op Pain Control Plan: multimodal analgesia and IV/PO Opioids PRN  Induction:  IV  Airway Plan: , Post-Induction  Informed Consent: Informed consent signed with the Patient and all parties understand the risks and agree with anesthesia plan.  All questions answered. Patient consented to blood products? No  ASA Score: 3    Ready For Surgery From Anesthesia Perspective.     .

## 2024-11-14 LAB
FINAL PATHOLOGIC DIAGNOSIS: NORMAL
GROSS: NORMAL
Lab: NORMAL
MICROSCOPIC EXAM: NORMAL

## 2024-11-15 ENCOUNTER — TELEPHONE (OUTPATIENT)
Dept: GASTROENTEROLOGY | Facility: CLINIC | Age: 74
End: 2024-11-15
Payer: MEDICARE

## 2024-11-15 ENCOUNTER — PATIENT MESSAGE (OUTPATIENT)
Dept: GASTROENTEROLOGY | Facility: CLINIC | Age: 74
End: 2024-11-15
Payer: MEDICARE

## 2024-11-15 DIAGNOSIS — B37.81 CANDIDA ESOPHAGITIS: Primary | ICD-10-CM

## 2024-11-15 RX ORDER — FLUCONAZOLE 200 MG/1
TABLET ORAL
Qty: 15 TABLET | Refills: 0 | Status: SHIPPED | OUTPATIENT
Start: 2024-11-15

## 2024-12-02 PROBLEM — D33.3 LEFT ACOUSTIC NEUROMA: Status: RESOLVED | Noted: 2022-04-08 | Resolved: 2024-12-02

## 2024-12-02 NOTE — PROCEDURES
Bharti Woodard is a 67 y.o.  female patient, who presents for a 6 minute walk test ordered by Luigi Willson MD.  The diagnosis is Pulmonary Hypertension; Interstitial Lung Disease.  The patient's BMI is 29.9 kg/m2.  Predicted distance (lower limit of normal) is 300.81 meters.      Test Results:    The test was completed without stopping.  The total time walked was 360 seconds.  During walking, the patient reported:  Chest pain, Leg pain, Dyspnea.  The patient used supplemental oxygen during testing.     11/17/2017---------Distance: 463.3 meters (1520 feet)     O2 Sat % Supplemental Oxygen Heart Rate Blood Pressure Dave Scale   Pre-exercise  (Resting) 94 % 3 L/M 64 bpm 115/70 mmHg 0.5   During Exercise 83 % 3 L/M 108 bpm 154/67 mmHg 4   Post-exercise  (Recovery) 92 % 3 L/M  92 bpm       Recovery Time: 72 seconds    Performing nurse/tech: Anupama Knowles RRT      PREVIOUS STUDY:   1/4/2011---------------Distance: 415 meters (1360 feet)  --------------------------------------O2 Sat %------------ Pulse ----------------- Blood Pressure:-------------- Dave Scale:  Resting on Room Air:-------------81----------------------90-------------------------105/57---------------------------4  Exercise on Room Air:------------76--------------------111-------------------------136/87---------------------------5-6  Recovery on Room Air:-----------82----------------------98-------------------------129/81---------------------------      CLINICAL INTERPRETATION:  Six minute walk distance is 463.3 meters (1520 feet) with somewhat heavy dyspnea.  During exercise, there was significant desaturation while breathing supplemental oxygen.  Both blood pressure and heart rate increased significantly with walking.  The patient reported non-pulmonary symptoms during exercise.  Since the previous study in January 2011, exercise capacity may be somewhat improved.  Based upon age and body mass index, exercise capacity is normal.  
stable

## 2024-12-13 ENCOUNTER — HOSPITAL ENCOUNTER (OUTPATIENT)
Dept: RADIOLOGY | Facility: HOSPITAL | Age: 74
Discharge: HOME | End: 2024-12-13
Attending: INTERNAL MEDICINE
Payer: MEDICARE

## 2024-12-13 DIAGNOSIS — R07.9 CHEST PAIN, UNSPECIFIED TYPE: ICD-10-CM

## 2024-12-13 PROCEDURE — 71046 X-RAY EXAM CHEST 2 VIEWS: CPT | Mod: 26,,, | Performed by: RADIOLOGY

## 2024-12-13 PROCEDURE — 71046 X-RAY EXAM CHEST 2 VIEWS: CPT | Mod: TC,FY

## 2025-01-06 ENCOUNTER — PATIENT MESSAGE (OUTPATIENT)
Dept: RHEUMATOLOGY | Facility: CLINIC | Age: 75
End: 2025-01-06
Payer: MEDICARE

## 2025-02-10 ENCOUNTER — HOSPITAL ENCOUNTER (OUTPATIENT)
Dept: RADIOLOGY | Facility: HOSPITAL | Age: 75
Discharge: HOME | End: 2025-02-10
Attending: INTERNAL MEDICINE
Payer: MEDICARE

## 2025-02-10 DIAGNOSIS — R29.818 OTHER SYMPTOMS AND SIGNS INVOLVING THE NERVOUS SYSTEM: ICD-10-CM

## 2025-02-10 DIAGNOSIS — R20.0 ARM NUMBNESS: ICD-10-CM

## 2025-02-10 DIAGNOSIS — H53.2 DOUBLE VISION: ICD-10-CM

## 2025-02-10 PROCEDURE — A9585 GADOBUTROL INJECTION: HCPCS | Performed by: INTERNAL MEDICINE

## 2025-02-10 PROCEDURE — 70553 MRI BRAIN STEM W/O & W/DYE: CPT | Mod: TC

## 2025-02-10 PROCEDURE — 25500020 PHARM REV CODE 255: Performed by: INTERNAL MEDICINE

## 2025-02-10 PROCEDURE — 70553 MRI BRAIN STEM W/O & W/DYE: CPT | Mod: 26,,, | Performed by: RADIOLOGY

## 2025-02-10 RX ORDER — GADOBUTROL 604.72 MG/ML
9.5 INJECTION INTRAVENOUS
Status: COMPLETED | OUTPATIENT
Start: 2025-02-10 | End: 2025-02-10

## 2025-02-10 RX ADMIN — GADOBUTROL 9.5 ML: 604.72 INJECTION INTRAVENOUS at 12:02

## 2025-02-28 ENCOUNTER — HOSPITAL ENCOUNTER (OUTPATIENT)
Dept: RADIOLOGY | Facility: HOSPITAL | Age: 75
Discharge: HOME | End: 2025-02-28
Attending: INTERNAL MEDICINE
Payer: MEDICARE

## 2025-02-28 DIAGNOSIS — Z12.31 ENCOUNTER FOR SCREENING MAMMOGRAM FOR BREAST CANCER: ICD-10-CM

## 2025-02-28 PROCEDURE — 77067 SCR MAMMO BI INCL CAD: CPT | Mod: 26,,, | Performed by: RADIOLOGY

## 2025-02-28 PROCEDURE — 77063 BREAST TOMOSYNTHESIS BI: CPT | Mod: TC

## 2025-02-28 PROCEDURE — 77063 BREAST TOMOSYNTHESIS BI: CPT | Mod: 26,,, | Performed by: RADIOLOGY

## 2025-03-24 ENCOUNTER — LAB VISIT (OUTPATIENT)
Dept: LAB | Facility: HOSPITAL | Age: 75
End: 2025-03-24
Attending: INTERNAL MEDICINE
Payer: MEDICARE

## 2025-03-24 DIAGNOSIS — J84.9 ILD (INTERSTITIAL LUNG DISEASE): ICD-10-CM

## 2025-03-24 DIAGNOSIS — R73.9 HYPERGLYCEMIA: ICD-10-CM

## 2025-03-24 DIAGNOSIS — J81.0 ACUTE PULMONARY EDEMA: ICD-10-CM

## 2025-03-24 DIAGNOSIS — Z79.899 ENCOUNTER FOR LONG-TERM (CURRENT) USE OF MEDICATIONS: ICD-10-CM

## 2025-03-24 DIAGNOSIS — J96.11 CHRONIC RESPIRATORY FAILURE WITH HYPOXIA: ICD-10-CM

## 2025-03-24 DIAGNOSIS — Z13.6 ENCOUNTER FOR SCREENING FOR CARDIOVASCULAR DISORDERS: ICD-10-CM

## 2025-03-24 DIAGNOSIS — I27.20 PULMONARY HYPERTENSION: ICD-10-CM

## 2025-03-24 PROBLEM — D84.821 IMMUNOSUPPRESSION DUE TO DRUG THERAPY: Status: ACTIVE | Noted: 2025-03-24

## 2025-03-24 LAB
ABSOLUTE EOSINOPHIL (OHS): 0.09 K/UL
ABSOLUTE MONOCYTE (OHS): 0.55 K/UL (ref 0.3–1)
ABSOLUTE NEUTROPHIL COUNT (OHS): 3.55 K/UL (ref 1.8–7.7)
ALBUMIN SERPL BCP-MCNC: 3.5 G/DL (ref 3.5–5.2)
ALP SERPL-CCNC: 103 UNIT/L (ref 40–150)
ALT SERPL W/O P-5'-P-CCNC: 11 UNIT/L (ref 10–44)
ANION GAP (OHS): 10 MMOL/L (ref 8–16)
AST SERPL-CCNC: 16 UNIT/L (ref 11–45)
BASOPHILS # BLD AUTO: 0.01 K/UL
BASOPHILS NFR BLD AUTO: 0.2 %
BILIRUB SERPL-MCNC: 0.3 MG/DL (ref 0.1–1)
BNP SERPL-MCNC: 31 PG/ML (ref 0–99)
BUN SERPL-MCNC: 15 MG/DL (ref 8–23)
CALCIUM SERPL-MCNC: 9.3 MG/DL (ref 8.7–10.5)
CHLORIDE SERPL-SCNC: 98 MMOL/L (ref 95–110)
CHOLEST SERPL-MCNC: 216 MG/DL (ref 120–199)
CHOLEST/HDLC SERPL: 5.1 {RATIO} (ref 2–5)
CO2 SERPL-SCNC: 34 MMOL/L (ref 23–29)
CREAT SERPL-MCNC: 0.9 MG/DL (ref 0.5–1.4)
CRP SERPL-MCNC: 18.9 MG/L
EAG (OHS): 120 MG/DL (ref 68–131)
ERYTHROCYTE [DISTWIDTH] IN BLOOD BY AUTOMATED COUNT: 13.1 % (ref 11.5–14.5)
ERYTHROCYTE [SEDIMENTATION RATE] IN BLOOD BY PHOTOMETRIC METHOD: 68 MM/HR
GFR SERPLBLD CREATININE-BSD FMLA CKD-EPI: >60 ML/MIN/1.73/M2
GLUCOSE SERPL-MCNC: 91 MG/DL (ref 70–110)
HBA1C MFR BLD: 5.8 % (ref 4–5.6)
HCT VFR BLD AUTO: 36.6 % (ref 37–48.5)
HDLC SERPL-MCNC: 42 MG/DL (ref 40–75)
HDLC SERPL: 19.4 % (ref 20–50)
HGB BLD-MCNC: 11.6 GM/DL (ref 12–16)
IMM GRANULOCYTES # BLD AUTO: 0.05 K/UL (ref 0–0.04)
IMM GRANULOCYTES NFR BLD AUTO: 0.9 % (ref 0–0.5)
LDLC SERPL CALC-MCNC: 139.4 MG/DL (ref 63–159)
LYMPHOCYTES # BLD AUTO: 1.47 K/UL (ref 1–4.8)
MCH RBC QN AUTO: 31.4 PG (ref 27–50)
MCHC RBC AUTO-ENTMCNC: 31.7 G/DL (ref 32–36)
MCV RBC AUTO: 99 FL (ref 82–98)
NONHDLC SERPL-MCNC: 174 MG/DL
NUCLEATED RBC (/100WBC) (OHS): 0 /100 WBC
PLATELET # BLD AUTO: 293 K/UL (ref 150–450)
PMV BLD AUTO: 8.7 FL (ref 9.2–12.9)
POTASSIUM SERPL-SCNC: 3.3 MMOL/L (ref 3.5–5.1)
PROT SERPL-MCNC: 7.6 GM/DL (ref 6–8.4)
RBC # BLD AUTO: 3.69 M/UL (ref 4–5.4)
RELATIVE EOSINOPHIL (OHS): 1.6 %
RELATIVE LYMPHOCYTE (OHS): 25.7 % (ref 18–48)
RELATIVE MONOCYTE (OHS): 9.6 % (ref 4–15)
RELATIVE NEUTROPHIL (OHS): 62 % (ref 38–73)
SODIUM SERPL-SCNC: 142 MMOL/L (ref 136–145)
TRIGL SERPL-MCNC: 173 MG/DL (ref 30–150)
WBC # BLD AUTO: 5.72 K/UL (ref 3.9–12.7)

## 2025-03-24 PROCEDURE — 85652 RBC SED RATE AUTOMATED: CPT

## 2025-03-24 PROCEDURE — 83880 ASSAY OF NATRIURETIC PEPTIDE: CPT

## 2025-03-24 PROCEDURE — 83036 HEMOGLOBIN GLYCOSYLATED A1C: CPT

## 2025-03-24 PROCEDURE — 85025 COMPLETE CBC W/AUTO DIFF WBC: CPT

## 2025-03-24 PROCEDURE — 36415 COLL VENOUS BLD VENIPUNCTURE: CPT

## 2025-03-24 PROCEDURE — 84075 ASSAY ALKALINE PHOSPHATASE: CPT

## 2025-03-24 PROCEDURE — 80061 LIPID PANEL: CPT

## 2025-03-24 PROCEDURE — 86140 C-REACTIVE PROTEIN: CPT

## 2025-07-25 ENCOUNTER — TELEPHONE (OUTPATIENT)
Dept: OTOLARYNGOLOGY | Facility: CLINIC | Age: 75
End: 2025-07-25
Payer: MEDICARE

## 2025-07-25 NOTE — TELEPHONE ENCOUNTER
Schedule an appointment for pt.      Copied from CRM #[id number]. Topic: General Inquiry - Return Call  >> Jul 25, 2025 11:33 AM Marcel wrote:  Type:  Patient Returning Call    Who Called: pt  Who Left Message for Patient: pt said no message was left  Does the patient know what this is regarding?:   Would the patient rather a call back or a response via [facility]?    Best Call Back Number: [phone]  Additional Information:

## 2025-07-25 NOTE — TELEPHONE ENCOUNTER
Spoke to pt and schedule an appointment for her.  Natalie      Copied from CRM #[id number]. Topic: Appointments - Appointment Access  >> Jul 25, 2025 10:21 AM Keara wrote:  Type:  Sooner Appointment Request    Caller is requesting a sooner appointment.  Caller declined first available appointment listed below.  Caller will not accept being placed on the waitlist and is requesting a message be sent to doctor.  Name of Caller:pt   When is the first available appointment?books are closed   Symptoms:ear wax, swooshing sound in here   Would the patient rather a call back or a response via [facility]? Call   Best Call Back Number:[phone]   Additional Information: would be a new pt

## 2025-07-28 ENCOUNTER — TELEPHONE (OUTPATIENT)
Dept: OTOLARYNGOLOGY | Facility: CLINIC | Age: 75
End: 2025-07-28
Payer: MEDICARE

## 2025-07-28 NOTE — TELEPHONE ENCOUNTER
Future appointment with us scheduled incorrectly. I rescheduled them and called patient leaving voicemail with new appointment date and time. Asked patient to confirm thru portal or to call to confirm

## 2025-08-05 ENCOUNTER — CLINICAL SUPPORT (OUTPATIENT)
Dept: OTOLARYNGOLOGY | Facility: CLINIC | Age: 75
End: 2025-08-05
Payer: MEDICARE

## 2025-08-05 ENCOUNTER — OFFICE VISIT (OUTPATIENT)
Dept: OTOLARYNGOLOGY | Facility: CLINIC | Age: 75
End: 2025-08-05
Payer: MEDICARE

## 2025-08-05 VITALS
BODY MASS INDEX: 30.86 KG/M2 | HEART RATE: 60 BPM | DIASTOLIC BLOOD PRESSURE: 77 MMHG | WEIGHT: 209 LBS | SYSTOLIC BLOOD PRESSURE: 154 MMHG

## 2025-08-05 DIAGNOSIS — H90.3 ASYMMETRICAL SENSORINEURAL HEARING LOSS: Primary | ICD-10-CM

## 2025-08-05 DIAGNOSIS — H93.13 TINNITUS OF BOTH EARS: ICD-10-CM

## 2025-08-05 DIAGNOSIS — H61.23 BILATERAL IMPACTED CERUMEN: Primary | ICD-10-CM

## 2025-08-05 DIAGNOSIS — D33.3 ACOUSTIC NEUROMA: ICD-10-CM

## 2025-08-05 DIAGNOSIS — H90.3 ASYMMETRIC SNHL (SENSORINEURAL HEARING LOSS): ICD-10-CM

## 2025-08-05 PROCEDURE — 99999 PR PBB SHADOW E&M-EST. PATIENT-LVL I: CPT | Mod: PBBFAC,,,

## 2025-08-05 PROCEDURE — 99999 PR PBB SHADOW E&M-EST. PATIENT-LVL III: CPT | Mod: PBBFAC,,, | Performed by: NURSE PRACTITIONER

## 2025-08-05 NOTE — PROCEDURES
Ear Cerumen Removal    Date/Time: 8/5/2025 8:00 AM    Performed by: Latha Lanza NP  Authorized by: Latha Lanza NP      Local anesthetic:  None  Location details:  Both ears  Procedure type: curette    Procedure type comment:  Suction  Cerumen  Removal Results:  Cerumen completely removed  Patient tolerance:  Patient tolerated the procedure well with no immediate complications

## 2025-08-05 NOTE — PROGRESS NOTES
Chief Complaint   Patient presents with    Cerumen Impaction   .     HPI: Patient is a very pleasant 74 y.o. female who presents today for ear cleaning and evaluation of acoustic neuroma.She was previously been followed by Dr. Rivera and Dr. Perez. Acoustic neuroma measuring 1.4 x 1.0 x 0.9 cm on recent MRI in February, which remains stable compared to previous measurements from November 2022 (1.4 x 0.8 x 0.9 cm) and 2022 (1.4 x 1.0 x 0 cm). She is due for follow-up MRI in November with Dr. Wise, neurosurgeon. She was previously offered treatment options of gamma knife, surgery, or observation by Dr. Harrington but declined gamma knife treatment due to personal concerns.. She reports no significant impact on her quality of life, noting she can still hear and the tumor does not appear to be growing towards her brain.    She reports bilateral swooshing sound in ears, describing it as different from ringing. She denies ear pain or drainage. She occasionally experiences difficulty hearing, sometimes requesting repetition of conversation, though unsure if due to hearing issue or cognitive processing. She has ongoing nasal congestion related to allergies, currently managed with saline nasal spray and a nasal spray prescribed by her pulmonologist. She recently took Zicam for symptom relief.        Past Medical History:   Diagnosis Date    Asthma     as child    Depression     Dysphagia     History of colonic polyps     ILD (interstitial lung disease)     Pulmonary hypertension     Sjoegren syndrome      Social History[1]  Past Surgical History:   Procedure Laterality Date    CARDIAC CATHETERIZATION Right 2016    Cataract surgery Bilateral     COLONOSCOPY N/A 8/3/2021    Procedure: COLONOSCOPY;  Surgeon: Dedra Tolliver MD;  Location: [facility] ([address] [place] [address]);  Service: Endoscopy;  Laterality: N/A;    ESOPHAGEAL MANOMETRY WITH MEASUREMENT OF IMPEDANCE N/A 8/3/2021    Procedure: MANOMETRY, ESOPHAGUS, WITH IMPEDANCE  MEASUREMENT;  Surgeon: Dedra Tolliver MD;  Location: [facility] (2ND FLR);  Service: Endoscopy;  Laterality: N/A;  r/o rumination    ESOPHAGOGASTRODUODENOSCOPY N/A 6/15/2021    Procedure: EGD (ESOPHAGOGASTRODUODENOSCOPY);  Surgeon: Yuriy Zimmerman MD;  Location: [facility];  Service: Endoscopy;  Laterality: N/A;    ESOPHAGOGASTRODUODENOSCOPY N/A 8/3/2021    Procedure: EGD (ESOPHAGOGASTRODUODENOSCOPY) with endoflip, esophageal manometry catheter placement, followed by esophageal manometry with dysphagia and rumination protocol AND colonoscopy.  Colonoscopy should be first, followed by EGD.;  Surgeon: Dedra Tolliver MD;  Location: [facility] (2ND FLR);  Service: Endoscopy;  Laterality: N/A;  Endoflip/endoscopically placed manometry probe  2nd floor-Pu    ESOPHAGOGASTRODUODENOSCOPY N/A 8/14/2024    Procedure: EGD (ESOPHAGOGASTRODUODENOSCOPY);  Surgeon: Pascual Ho MD;  Location: [facility];  Service: Endoscopy;  Laterality: N/A;    ESOPHAGOGASTRODUODENOSCOPY N/A 11/11/2024    Procedure: EGD (ESOPHAGOGASTRODUODENOSCOPY);  Surgeon: Dedra Tolliver MD;  [address]  Service: Endoscopy;  Laterality: N/A;  Ijeoma MCCAULEY PA-C, portal, CL, EGD with EoE biopsies and dilation pt decline earlier dated. ASam  Patient seen by pulmonology on 7/16/24-return in 6 months-GT  11/4-3 days full liquids per dr mina renee call complete-tb    HYSTERECTOMY      26 yrs old    LUNG BIOPSY       Family History   Problem Relation Name Age of Onset    Diabetes Mother      Heart disease Mother          open heart surgery    Diabetes Father      Diabetes Sister      Breast cancer Sister      Stroke Brother      Diabetes Brother      Breast cancer Maternal Aunt      Cancer Maternal Grandmother          stomach    Colon cancer Neg Hx      Ovarian cancer Neg Hx      Inflammatory bowel disease Neg Hx      Lupus Neg Hx      Psoriasis Neg Hx      Rheum arthritis Neg Hx             Review of Systems  General: negative for  chills, fever or weight loss  Psychological: negative for mood changes or depression  Ophthalmic: negative for blurry vision, photophobia or eye pain  ENT: see HPI  Respiratory: no cough, shortness of breath, or wheezing  Cardiovascular: no chest pain or dyspnea on exertion  Gastrointestinal: no abdominal pain, change in bowel habits, or black/ bloody stools  Musculoskeletal: negative for gait disturbance or muscular weakness  Neurological: no syncope or seizures; no ataxia  Dermatological: negative for puritis,  rash and jaundice  Hematologic/lymphatic: no easy bruising, no new lumps or bumps      Physical Exam:    Vitals:    08/05/25 0756   BP: (!) 154/77   Pulse: 60       Constitutional: Well appearing / communicating without difficutly.  NAD.  Eyes: EOM I Bilaterally  Head/Face: Normocephalic.  Negative paranasal sinus pressure/tenderness.  Salivary glands WNL.  House Brackmann I Bilaterally.    Right Ear: Auricle normal appearance. External Auditory Canal with cerumen impaction. EAC within normal limits no lesions or masses,TM w/o masses/lesions/perforations. TM mobility noted.   Left Ear: Auricle normal appearance. External Auditory Canal with cerumen impaction. EAC within normal limits no lesions or masses,TM w/o masses/lesions/perforations. TM mobility noted.  Nose: No gross nasal septal deviation. Inferior Turbinates 3+ bilaterally. No septal perforation. No masses/lesions. External nasal skin appears normal without masses/lesions.  Oral Cavity: Gingiva/lips within normal limits.  Dentition/gingiva healthy appearing. Mucus membranes moist. Floor of mouth soft, no masses palpated. Oral Tongue mobile. Hard Palate appears normal.    Oropharynx: Base of tongue appears normal. No masses/lesions noted. Tonsillar fossa/pharyngeal wall without lesions. Posterior oropharynx WNL.  Soft palate without masses. Midline uvula.   Neck/Lymphatic: No LAD I-VI bilaterally.  No thyromegaly.  No masses noted on exam.    Mirror  laryngoscopy/nasopharyngoscopy: Active gag reflex.  Unable to perform.    Neuro/Psychiatric: AOx3.  Normal mood and affect.   Cardiovascular: Normal carotid pulses bilaterally, no increasing jugular venous distention noted at cervical region bilaterally.    Respiratory: Normal respiratory effort, no stridor, no retractions noted.      Ear Cerumen Removal under microscopy    Date/Time: 8/5/2025 8:00 AM    Performed by: Latha Lanza NP  Authorized by: Latha Lanza NP      Local anesthetic:  None  Location details:  Both ears  Procedure type: curette    Procedure type comment:  Suction  Cerumen  Removal Results:  Cerumen completely removed  Patient tolerance:  Patient tolerated the procedure well with no immediate complications    Diagnostic Studies:  Audiogram reviewed personally by myself and in detail with the patient today.   Pure tone testing revealed mild hearing loss at 8000 Hz in the right ear and mild sensorineural hearing loss at 5115-3485 Hz in the left ear. Speech reception thresholds were obtained at 10 dBHL in the right ear and 15 dBHL in the left ear. Speech discrimination scores were 100% in the right ear and 100% in the left ear. Tympanometry revealed Type A tympanograms in both ears.       MRI BRAIN W WO CONTRAST 2/10/2025     CLINICAL HISTORY:  Neuro deficit, acute, stroke suspected; Diplopia     TECHNIQUE:  Multiplanar multisequence MR imaging of the brain was performed before and after the administration of 9.5 mL Gadavist intravenous contrast.     COMPARISON:  10/25/2017     FINDINGS:  MRI of the brain demonstrates normal ventricle size and overall normal brain volume.     Patchy white matter changes in the periventricular areas and subcortical zones greater than expected for age, likely related to chronic microvascular disease.  No diffusion restriction.  Vascular flow voids are patent.  Left vertebral artery is dominant.  There is no acute or chronic hemorrhage.     Small incidental  lipoma is seen within the quadrigeminal plate cistern abutting the tectum and cerebral aqueduct measuring 6 mm.     Following contrast, there is enhancing lesion within left internal auditory canal and cerebellopontine angle cistern measuring 1.4 cm transverse by 1.0 cm craniocaudal by 0.9 cm AP.  Compared to the prior study, the lesion has increased in size slightly, previously measuring 1.2 cm by 0.7 cm by 0.7 cm.  No additional enhancing lesion is seen.     Impression:     Left sided internal auditory canal lesion consistent with vestibular schwannoma.  This has increased in size compared to the prior study, as above.     White matter changes likely related to chronic microvascular disease     Small incidental quadrigeminal plate cistern lipoma    MRI BRAIN W WO CONTRAST 11/29/2023  Order: [id number]  Impression    1.   Unchanged left vestibular schwannoma.  2.   No acute intracranial abnormality.  3.   Unchanged severe chronic microvascular ischemic changes.    Electronically Signed By: Reagan Alas MD 11/29/2023 12:43 CST  Narrative    EXAM: [facility] BRAIN W WO CONTRAST 11/29/2023    CLINICAL INDICATION:  Vestibular schwannoma.    TECHNIQUE: Pre-contrast and postcontrast acquisitions of the brain and temporal bones were obtained.  Intravenous contrast material was administered for the examination.  CONTRAST: GADOBUTROL 10 MMOL/10 ML (1 MMOL/ML) INTRAVENOUS SOLUTION:9mL    COMPARISON: MRI brain IAC protocol 10/3/2022, 11/22/2021    FINDINGS:    TEMPORAL BONE:  Right:  Cerebellopontine Angle Cistern and Internal Auditory Canal: Normal without abnormal mass or enhancement.  Membranous Labyrinth: Normal  Tympanomastoid Cavities: Normal    Left:  Cerebellopontine Angle Cistern and Internal Auditory Canal: Similar size and appearance of enhancing mass centered at the left cerebellopontine angle arising from the internal auditory canal measuring 1.4 x 0.8 x 0.9 cm (images 5-6, series 13; image 8, series 14),  previously measured 1.4 x 1 x 0.8 cm on 10/3/2022 and 1.2 x 0.9 x 0.7 cm on 10/12/2021.  Membranous Labyrinth: Normal  Tympanomastoid Cavities: Normal      BRAIN:  Parenchyma: No infarction on DWI. No hemorrhage. No midline shift or herniation. Confluent areas of T2 hyperintensity are present in the cerebral white matter that are nonspecific but compatible with unchanged severe chronic microvascular ischemic changes.    Abnormal Enhancement: None    Extra-axial Collection:  None. Unchanged benign arachnoid granulations in the posterior midline of the posterior fossa. Unchanged small benign lipoma along the right tectal plate measuring 5 x 4 mm (image 10, series 4).    Ventricular System: Normal    Major Intracranial Flow Voids: Normal    Osseous Structures:  Expected marrow signal.    Included Orbits: Status post bilateral cataract surgery.    Paranasal Sinuses:  Patchy mucosal thickening and opacification of ethmoid air cells.       Assessment:    ICD-10-CM ICD-9-CM    1. Bilateral impacted cerumen  H61.23 380.4 Ear Cerumen Removal      2. Asymmetric SNHL (sensorineural hearing loss)  H90.3 389.16       3. Acoustic neuroma  D33.3 225.1       4. Tinnitus of both ears  H93.13 388.30         The primary encounter diagnosis was Bilateral impacted cerumen. Diagnoses of Asymmetric SNHL (sensorineural hearing loss), Acoustic neuroma, and Tinnitus of both ears were also pertinent to this visit.      Plan:  Orders Placed This Encounter   Procedures    Ear Cerumen Removal         ACOUSTIC NEUROMA:  - Reviewed history of acoustic neuroma, noting stable size on recent MRIs (1.4 x 1.0 x 0.9 cm in February 2025, compared to 1.4 x 0.8 x 0.9 cm in November 2022).  - Considered preference for continued monitoring over surgical intervention or radiation therapy.  - Advised continued follow-up with Dr. Wise for acoustic neuroma monitoring.     SENSORINEURAL HEARING LOSS AND TINNITUS:  - We reviewed the patient's recent audiogram  and hearing loss in detail.   We also discussed the use hearing protection when exposed to loud noise, including lawn equipment. Recommend audiogram in 1 year.      IMPACTED CERUMEN:  - Cerumen impaction:  Removed under microscopy today without difficulty.  I instructed the patient to avoid Qtips.      ALLERGIC RHINITIS:  - Recommend considering OTC nasal sprays such as Flonase or Astelin for allergy management.    FOLLOW-UP:  - Follow up in 1 year for ear cleaning and hearing test.         Latha Lanza NP     This note was generated with the assistance of ambient listening technology. Verbal consent was obtained by the patient and accompanying visitor(s) for the recording of patient appointment to facilitate this note. I attest to having reviewed and edited the generated note for accuracy, though some syntax or spelling errors may persist. Please contact the author of this note for any clarification.          Answers submitted by the patient for this visit:  Review of Symptoms Questionnaire  (Submitted on 2025)  Fatigue (Tiredness)?: Yes  hearing loss: Yes  Tooth/Dental Problems?: Yes  eye itching: Yes  Light sensitivity / Light hurts the eyes?: Yes  Sleep Apnea?: Yes  shortness of breath: Yes  Foot swelling?: Yes  None of these: Yes  None of these: Yes  Muscle aches / pain?: Yes  back pain: Yes  neck pain: Yes  rash: Yes  Seasonal Allergies?: Yes  None of these : Yes  None of these: Yes  None of these: Yes  decreased concentration: Yes  Feeling depressed?: Yes  nervous/ anxious: Yes  sleep disturbance: Yes         [1]   Social History  Socioeconomic History    Marital status:    Tobacco Use    Smoking status: Former     Current packs/day: 0.00     Average packs/day: 3.0 packs/day for 5.0 years (15.0 ttl pk-yrs)     Types: Cigarettes     Start date: 1990     Quit date: 1995     Years since quittin.6    Smokeless tobacco: Never    Tobacco comments:     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    Substance and Sexual Activity    Alcohol use: Yes     Comment: occasional-wine    Drug use: No    Sexual activity: Not Currently     Birth control/protection: Surgical     Social Drivers of Health     Financial Resource Strain: Low Risk  (1/24/2025)    Received from [facility]    Overall Financial Resource Strain (CARDIA)     Difficulty of Paying Living Expenses: Not hard at all   Food Insecurity: No Food Insecurity (1/24/2025)    Received from [facility]    Hunger Vital Sign     Worried About Running Out of Food in the Last Year: Never true     Ran Out of Food in the Last Year: Never true   Transportation Needs: No Transportation Needs (1/24/2025)    Received from [facility]    PRAPARE - Transportation     Lack of Transportation (Medical): No     Lack of Transportation (Non-Medical): No   Physical Activity: Inactive (1/24/2025)    Received from [facility]    Exercise Vital Sign     Days of Exercise per Week: 0 days     Minutes of Exercise per Session: 0 min   Stress: Stress Concern Present (1/24/2025)    Received from [facility]    [employer] - Occupational Stress Questionnaire     Feeling of Stress : To some extent   Housing Stability: Low Risk  (8/21/2023)    Received from [facility]    Housing Stability Vital Sign     Unable to Pay for Housing in the Last Year: No     Number of Places Lived in the Last Year: 1     Unstable Housing in the Last Year: No

## 2025-08-05 NOTE — PROGRESS NOTES
"Bharti Woodard, a 74 y.o. female was seen today in the clinic for audiologic evaluation following ear cleaning. Ms. Woodard has history of left sided acoustic neuroma. She complains of "swooshing" in both ears, worse in the left. The following information was gathered through patient interview and/or record review:    Previous Audio: 2017-"normal to borderline normal" hearing AU; 2022 with Dr. Harrington - HF asymmetry, worse in right ear  Hearing Aids use: none  History of noise exposure: none reported  Tinnitus: "swooshing" AU; worse left ear  Ear pain: none reported  Dizziness: patient reports veering to one side when walking; denies dizziness    Pure tone testing revealed mild hearing loss at 8000 Hz in the right ear and mild sensorineural hearing loss at 7168-8184 Hz in the left ear. Speech reception thresholds were obtained at 10 dBHL in the right ear and 15 dBHL in the left ear. Speech discrimination scores were 100% in the right ear and 100% in the left ear. Tympanometry revealed Type A tympanograms in both ears.     Recommendations:  Otologic evaluation  Audiologic evaluation per ENT recommendation/annually  Hearing protection in noise            "

## 2025-08-06 ENCOUNTER — PATIENT MESSAGE (OUTPATIENT)
Dept: GASTROENTEROLOGY | Facility: CLINIC | Age: 75
End: 2025-08-06
Payer: MEDICARE